# Patient Record
Sex: MALE | Employment: OTHER | URBAN - METROPOLITAN AREA
[De-identification: names, ages, dates, MRNs, and addresses within clinical notes are randomized per-mention and may not be internally consistent; named-entity substitution may affect disease eponyms.]

---

## 2020-11-10 ENCOUNTER — HOSPITAL ENCOUNTER (INPATIENT)
Facility: CLINIC | Age: 60
LOS: 25 days | Discharge: NURSING, REHAB OR LONG-TERM CARE | DRG: 003 | End: 2020-12-05
Attending: INTERNAL MEDICINE | Admitting: INTERNAL MEDICINE
Payer: COMMERCIAL

## 2020-11-10 DIAGNOSIS — G70.01 MYASTHENIA GRAVIS WITH EXACERBATION (H): Primary | ICD-10-CM

## 2020-11-10 PROBLEM — J96.01 ACUTE HYPOXEMIC RESPIRATORY FAILURE (H): Status: ACTIVE | Noted: 2020-11-10

## 2020-11-10 LAB — GLUCOSE BLDC GLUCOMTR-MCNC: 89 MG/DL (ref 70–99)

## 2020-11-10 PROCEDURE — 999N000157 HC STATISTIC RCP TIME EA 10 MIN

## 2020-11-10 PROCEDURE — 200N000001 HC R&B ICU

## 2020-11-10 PROCEDURE — 258N000003 HC RX IP 258 OP 636: Performed by: INTERNAL MEDICINE

## 2020-11-10 PROCEDURE — 999N001017 HC STATISTIC GLUCOSE BY METER IP

## 2020-11-10 PROCEDURE — 81001 URINALYSIS AUTO W/SCOPE: CPT | Performed by: INTERNAL MEDICINE

## 2020-11-10 PROCEDURE — 94002 VENT MGMT INPAT INIT DAY: CPT

## 2020-11-10 PROCEDURE — U0003 INFECTIOUS AGENT DETECTION BY NUCLEIC ACID (DNA OR RNA); SEVERE ACUTE RESPIRATORY SYNDROME CORONAVIRUS 2 (SARS-COV-2) (CORONAVIRUS DISEASE [COVID-19]), AMPLIFIED PROBE TECHNIQUE, MAKING USE OF HIGH THROUGHPUT TECHNOLOGIES AS DESCRIBED BY CMS-2020-01-R: HCPCS | Performed by: INTERNAL MEDICINE

## 2020-11-10 PROCEDURE — 5A1955Z RESPIRATORY VENTILATION, GREATER THAN 96 CONSECUTIVE HOURS: ICD-10-PCS | Performed by: INTERNAL MEDICINE

## 2020-11-10 PROCEDURE — 87899 AGENT NOS ASSAY W/OPTIC: CPT | Performed by: INTERNAL MEDICINE

## 2020-11-10 PROCEDURE — 99291 CRITICAL CARE FIRST HOUR: CPT | Performed by: INTERNAL MEDICINE

## 2020-11-10 PROCEDURE — 250N000013 HC RX MED GY IP 250 OP 250 PS 637: Performed by: INTERNAL MEDICINE

## 2020-11-10 PROCEDURE — 999N000009 HC STATISTIC AIRWAY CARE

## 2020-11-10 PROCEDURE — 87040 BLOOD CULTURE FOR BACTERIA: CPT | Performed by: INTERNAL MEDICINE

## 2020-11-10 RX ORDER — ALBUTEROL SULFATE 0.83 MG/ML
2.5 SOLUTION RESPIRATORY (INHALATION)
Status: DISCONTINUED | OUTPATIENT
Start: 2020-11-10 | End: 2020-12-04

## 2020-11-10 RX ORDER — AMOXICILLIN 250 MG
2 CAPSULE ORAL 2 TIMES DAILY PRN
Status: DISCONTINUED | OUTPATIENT
Start: 2020-11-10 | End: 2020-12-05 | Stop reason: HOSPADM

## 2020-11-10 RX ORDER — DEXTROSE MONOHYDRATE 25 G/50ML
25-50 INJECTION, SOLUTION INTRAVENOUS
Status: DISCONTINUED | OUTPATIENT
Start: 2020-11-10 | End: 2020-12-05 | Stop reason: HOSPADM

## 2020-11-10 RX ORDER — HEPARIN SODIUM 5000 [USP'U]/.5ML
5000 INJECTION, SOLUTION INTRAVENOUS; SUBCUTANEOUS EVERY 8 HOURS
Status: DISCONTINUED | OUTPATIENT
Start: 2020-11-11 | End: 2020-11-17

## 2020-11-10 RX ORDER — SODIUM CHLORIDE, SODIUM LACTATE, POTASSIUM CHLORIDE, CALCIUM CHLORIDE 600; 310; 30; 20 MG/100ML; MG/100ML; MG/100ML; MG/100ML
INJECTION, SOLUTION INTRAVENOUS CONTINUOUS
Status: DISCONTINUED | OUTPATIENT
Start: 2020-11-11 | End: 2020-11-12

## 2020-11-10 RX ORDER — AZITHROMYCIN 500 MG/1
500 INJECTION, POWDER, LYOPHILIZED, FOR SOLUTION INTRAVENOUS EVERY 24 HOURS
Status: DISCONTINUED | OUTPATIENT
Start: 2020-11-11 | End: 2020-11-11

## 2020-11-10 RX ORDER — PROPOFOL 10 MG/ML
10-20 INJECTION, EMULSION INTRAVENOUS EVERY 30 MIN PRN
Status: DISCONTINUED | OUTPATIENT
Start: 2020-11-10 | End: 2020-11-12

## 2020-11-10 RX ORDER — PIPERACILLIN SODIUM, TAZOBACTAM SODIUM 3; .375 G/15ML; G/15ML
3.38 INJECTION, POWDER, LYOPHILIZED, FOR SOLUTION INTRAVENOUS EVERY 6 HOURS
Status: DISCONTINUED | OUTPATIENT
Start: 2020-11-11 | End: 2020-11-11

## 2020-11-10 RX ORDER — PROPOFOL 10 MG/ML
5-75 INJECTION, EMULSION INTRAVENOUS CONTINUOUS
Status: DISCONTINUED | OUTPATIENT
Start: 2020-11-10 | End: 2020-11-12

## 2020-11-10 RX ORDER — ONDANSETRON 2 MG/ML
4 INJECTION INTRAMUSCULAR; INTRAVENOUS EVERY 6 HOURS PRN
Status: DISCONTINUED | OUTPATIENT
Start: 2020-11-10 | End: 2020-11-24

## 2020-11-10 RX ORDER — ACETAMINOPHEN 325 MG/1
650 TABLET ORAL EVERY 4 HOURS PRN
Status: DISCONTINUED | OUTPATIENT
Start: 2020-11-10 | End: 2020-12-05 | Stop reason: HOSPADM

## 2020-11-10 RX ORDER — ONDANSETRON 4 MG/1
4 TABLET, ORALLY DISINTEGRATING ORAL EVERY 6 HOURS PRN
Status: DISCONTINUED | OUTPATIENT
Start: 2020-11-10 | End: 2020-11-24

## 2020-11-10 RX ORDER — NALOXONE HYDROCHLORIDE 0.4 MG/ML
.1-.4 INJECTION, SOLUTION INTRAMUSCULAR; INTRAVENOUS; SUBCUTANEOUS
Status: DISCONTINUED | OUTPATIENT
Start: 2020-11-10 | End: 2020-11-24

## 2020-11-10 RX ORDER — HYDROMORPHONE HYDROCHLORIDE 1 MG/ML
.3-.5 INJECTION, SOLUTION INTRAMUSCULAR; INTRAVENOUS; SUBCUTANEOUS
Status: DISCONTINUED | OUTPATIENT
Start: 2020-11-10 | End: 2020-11-14

## 2020-11-10 RX ORDER — PROPOFOL 10 MG/ML
INJECTION, EMULSION INTRAVENOUS
Status: COMPLETED
Start: 2020-11-10 | End: 2020-11-11

## 2020-11-10 RX ORDER — CHLORHEXIDINE GLUCONATE ORAL RINSE 1.2 MG/ML
15 SOLUTION DENTAL EVERY 12 HOURS
Status: DISCONTINUED | OUTPATIENT
Start: 2020-11-11 | End: 2020-12-04

## 2020-11-10 RX ORDER — AMOXICILLIN 250 MG
1 CAPSULE ORAL 2 TIMES DAILY PRN
Status: DISCONTINUED | OUTPATIENT
Start: 2020-11-10 | End: 2020-12-05 | Stop reason: HOSPADM

## 2020-11-10 RX ORDER — NICOTINE POLACRILEX 4 MG
15-30 LOZENGE BUCCAL
Status: DISCONTINUED | OUTPATIENT
Start: 2020-11-10 | End: 2020-12-05 | Stop reason: HOSPADM

## 2020-11-10 RX ADMIN — SODIUM CHLORIDE, POTASSIUM CHLORIDE, SODIUM LACTATE AND CALCIUM CHLORIDE: 600; 310; 30; 20 INJECTION, SOLUTION INTRAVENOUS at 23:56

## 2020-11-10 RX ADMIN — CHLORHEXIDINE GLUCONATE 0.12% ORAL RINSE 15 ML: 1.2 LIQUID ORAL at 23:56

## 2020-11-10 ASSESSMENT — MIFFLIN-ST. JEOR: SCORE: 1607.5

## 2020-11-11 ENCOUNTER — APPOINTMENT (OUTPATIENT)
Dept: MRI IMAGING | Facility: CLINIC | Age: 60
DRG: 003 | End: 2020-11-11
Attending: STUDENT IN AN ORGANIZED HEALTH CARE EDUCATION/TRAINING PROGRAM
Payer: COMMERCIAL

## 2020-11-11 ENCOUNTER — APPOINTMENT (OUTPATIENT)
Dept: GENERAL RADIOLOGY | Facility: CLINIC | Age: 60
DRG: 003 | End: 2020-11-11
Attending: INTERNAL MEDICINE
Payer: COMMERCIAL

## 2020-11-11 ENCOUNTER — TRANSFERRED RECORDS (OUTPATIENT)
Dept: HEALTH INFORMATION MANAGEMENT | Facility: CLINIC | Age: 60
End: 2020-11-11

## 2020-11-11 LAB
AFP SERPL-MCNC: 2.5 UG/L (ref 0–8)
ALBUMIN SERPL-MCNC: 2.7 G/DL (ref 3.4–5)
ALBUMIN UR-MCNC: 30 MG/DL
ALP SERPL-CCNC: 55 U/L (ref 40–150)
ALT SERPL W P-5'-P-CCNC: 44 U/L (ref 0–70)
ANION GAP SERPL CALCULATED.3IONS-SCNC: 4 MMOL/L (ref 3–14)
ANION GAP SERPL CALCULATED.3IONS-SCNC: 5 MMOL/L (ref 3–14)
APPEARANCE UR: CLEAR
AST SERPL W P-5'-P-CCNC: 29 U/L (ref 0–45)
BASE DEFICIT BLDV-SCNC: 0.3 MMOL/L
BASE DEFICIT BLDV-SCNC: 1 MMOL/L
BILIRUB SERPL-MCNC: 0.3 MG/DL (ref 0.2–1.3)
BILIRUB UR QL STRIP: NEGATIVE
BUN SERPL-MCNC: 23 MG/DL (ref 7–30)
BUN SERPL-MCNC: 23 MG/DL (ref 7–30)
CA-I BLD-MCNC: 4.3 MG/DL (ref 4.4–5.2)
CALCIUM SERPL-MCNC: 7.3 MG/DL (ref 8.5–10.1)
CALCIUM SERPL-MCNC: 7.9 MG/DL (ref 8.5–10.1)
CHLORIDE SERPL-SCNC: 113 MMOL/L (ref 94–109)
CHLORIDE SERPL-SCNC: 113 MMOL/L (ref 94–109)
CO2 SERPL-SCNC: 25 MMOL/L (ref 20–32)
CO2 SERPL-SCNC: 26 MMOL/L (ref 20–32)
COLOR UR AUTO: YELLOW
CREAT SERPL-MCNC: 0.91 MG/DL (ref 0.66–1.25)
CREAT SERPL-MCNC: 0.95 MG/DL (ref 0.66–1.25)
ERYTHROCYTE [DISTWIDTH] IN BLOOD BY AUTOMATED COUNT: 13.9 % (ref 10–15)
ERYTHROCYTE [DISTWIDTH] IN BLOOD BY AUTOMATED COUNT: 14 % (ref 10–15)
FLUAV+FLUBV AG SPEC QL: NEGATIVE
FLUAV+FLUBV AG SPEC QL: NEGATIVE
GFR SERPL CREATININE-BSD FRML MDRD: 87 ML/MIN/{1.73_M2}
GFR SERPL CREATININE-BSD FRML MDRD: >90 ML/MIN/{1.73_M2}
GLUCOSE BLDC GLUCOMTR-MCNC: 108 MG/DL (ref 70–99)
GLUCOSE BLDC GLUCOMTR-MCNC: 71 MG/DL (ref 70–99)
GLUCOSE BLDC GLUCOMTR-MCNC: 80 MG/DL (ref 70–99)
GLUCOSE BLDC GLUCOMTR-MCNC: 83 MG/DL (ref 70–99)
GLUCOSE BLDC GLUCOMTR-MCNC: 87 MG/DL (ref 70–99)
GLUCOSE BLDC GLUCOMTR-MCNC: 91 MG/DL (ref 70–99)
GLUCOSE SERPL-MCNC: 77 MG/DL (ref 70–99)
GLUCOSE SERPL-MCNC: 86 MG/DL (ref 70–99)
GLUCOSE UR STRIP-MCNC: NEGATIVE MG/DL
GRAM STN SPEC: NORMAL
GRAM STN SPEC: NORMAL
HCO3 BLDV-SCNC: 26 MMOL/L (ref 21–28)
HCO3 BLDV-SCNC: 27 MMOL/L (ref 21–28)
HCT VFR BLD AUTO: 41.2 % (ref 40–53)
HCT VFR BLD AUTO: 41.6 % (ref 40–53)
HGB BLD-MCNC: 13.3 G/DL (ref 13.3–17.7)
HGB BLD-MCNC: 13.3 G/DL (ref 13.3–17.7)
HGB UR QL STRIP: ABNORMAL
KETONES UR STRIP-MCNC: 40 MG/DL
L PNEUMO1 AG UR QL IA: NORMAL
LABORATORY COMMENT REPORT: NORMAL
LACTATE BLD-SCNC: 1.5 MMOL/L (ref 0.7–2)
LACTATE BLD-SCNC: 4.1 MMOL/L (ref 0.7–2)
LDH SERPL L TO P-CCNC: 122 U/L (ref 85–227)
LEUKOCYTE ESTERASE UR QL STRIP: NEGATIVE
MAGNESIUM SERPL-MCNC: 2.2 MG/DL (ref 1.6–2.3)
MCH RBC QN AUTO: 30 PG (ref 26.5–33)
MCH RBC QN AUTO: 30 PG (ref 26.5–33)
MCHC RBC AUTO-ENTMCNC: 32 G/DL (ref 31.5–36.5)
MCHC RBC AUTO-ENTMCNC: 32.3 G/DL (ref 31.5–36.5)
MCV RBC AUTO: 93 FL (ref 78–100)
MCV RBC AUTO: 94 FL (ref 78–100)
MUCOUS THREADS #/AREA URNS LPF: PRESENT /LPF
NITRATE UR QL: NEGATIVE
O2/TOTAL GAS SETTING VFR VENT: 60 %
OXYHGB MFR BLDV: 62 %
OXYHGB MFR BLDV: 68 %
PCO2 BLDV: 48 MM HG (ref 40–50)
PCO2 BLDV: 57 MM HG (ref 40–50)
PH BLDV: 7.28 PH (ref 7.32–7.43)
PH BLDV: 7.34 PH (ref 7.32–7.43)
PH UR STRIP: 5.5 PH (ref 5–7)
PHOSPHATE SERPL-MCNC: 3.3 MG/DL (ref 2.5–4.5)
PLATELET # BLD AUTO: 199 10E9/L (ref 150–450)
PLATELET # BLD AUTO: 221 10E9/L (ref 150–450)
PO2 BLDV: 33 MM HG (ref 25–47)
PO2 BLDV: 40 MM HG (ref 25–47)
POTASSIUM SERPL-SCNC: 3.8 MMOL/L (ref 3.4–5.3)
POTASSIUM SERPL-SCNC: 4.3 MMOL/L (ref 3.4–5.3)
PROT SERPL-MCNC: 5.2 G/DL (ref 6.8–8.8)
RBC # BLD AUTO: 4.44 10E12/L (ref 4.4–5.9)
RBC # BLD AUTO: 4.44 10E12/L (ref 4.4–5.9)
RBC #/AREA URNS AUTO: 51 /HPF (ref 0–2)
SARS-COV-2 RNA SPEC QL NAA+PROBE: NEGATIVE
SARS-COV-2 RNA SPEC QL NAA+PROBE: NORMAL
SODIUM SERPL-SCNC: 142 MMOL/L (ref 133–144)
SODIUM SERPL-SCNC: 144 MMOL/L (ref 133–144)
SOURCE: ABNORMAL
SP GR UR STRIP: 1.03 (ref 1–1.03)
SPECIMEN SOURCE: NORMAL
UROBILINOGEN UR STRIP-MCNC: NORMAL MG/DL (ref 0–2)
WBC # BLD AUTO: 11.6 10E9/L (ref 4–11)
WBC # BLD AUTO: 12.6 10E9/L (ref 4–11)
WBC #/AREA URNS AUTO: 2 /HPF (ref 0–5)

## 2020-11-11 PROCEDURE — 999N000065 XR CHEST PORT 1 VW

## 2020-11-11 PROCEDURE — 82105 ALPHA-FETOPROTEIN SERUM: CPT | Performed by: INTERNAL MEDICINE

## 2020-11-11 PROCEDURE — 84182 PROTEIN WESTERN BLOT TEST: CPT | Performed by: INTERNAL MEDICINE

## 2020-11-11 PROCEDURE — C9113 INJ PANTOPRAZOLE SODIUM, VIA: HCPCS | Performed by: INTERNAL MEDICINE

## 2020-11-11 PROCEDURE — 250N000009 HC RX 250: Performed by: INTERNAL MEDICINE

## 2020-11-11 PROCEDURE — 83519 RIA NONANTIBODY: CPT | Performed by: INTERNAL MEDICINE

## 2020-11-11 PROCEDURE — 86255 FLUORESCENT ANTIBODY SCREEN: CPT | Performed by: INTERNAL MEDICINE

## 2020-11-11 PROCEDURE — 250N000011 HC RX IP 250 OP 636: Performed by: INTERNAL MEDICINE

## 2020-11-11 PROCEDURE — 258N000003 HC RX IP 258 OP 636: Performed by: INTERNAL MEDICINE

## 2020-11-11 PROCEDURE — 99291 CRITICAL CARE FIRST HOUR: CPT | Performed by: INTERNAL MEDICINE

## 2020-11-11 PROCEDURE — 36415 COLL VENOUS BLD VENIPUNCTURE: CPT | Performed by: INTERNAL MEDICINE

## 2020-11-11 PROCEDURE — 999N000009 HC STATISTIC AIRWAY CARE

## 2020-11-11 PROCEDURE — 86301 IMMUNOASSAY TUMOR CA 19-9: CPT | Performed by: INTERNAL MEDICINE

## 2020-11-11 PROCEDURE — 82330 ASSAY OF CALCIUM: CPT | Performed by: INTERNAL MEDICINE

## 2020-11-11 PROCEDURE — 258N000001 HC RX 258: Performed by: INTERNAL MEDICINE

## 2020-11-11 PROCEDURE — 99207 PR SATISFY VISIT NUMBER: CPT | Performed by: PSYCHIATRY & NEUROLOGY

## 2020-11-11 PROCEDURE — 94003 VENT MGMT INPAT SUBQ DAY: CPT

## 2020-11-11 PROCEDURE — 3E043XZ INTRODUCTION OF VASOPRESSOR INTO CENTRAL VEIN, PERCUTANEOUS APPROACH: ICD-10-PCS | Performed by: INTERNAL MEDICINE

## 2020-11-11 PROCEDURE — 81001 URINALYSIS AUTO W/SCOPE: CPT | Performed by: INTERNAL MEDICINE

## 2020-11-11 PROCEDURE — 83519 RIA NONANTIBODY: CPT | Performed by: STUDENT IN AN ORGANIZED HEALTH CARE EDUCATION/TRAINING PROGRAM

## 2020-11-11 PROCEDURE — 83735 ASSAY OF MAGNESIUM: CPT | Performed by: INTERNAL MEDICINE

## 2020-11-11 PROCEDURE — 87040 BLOOD CULTURE FOR BACTERIA: CPT | Performed by: INTERNAL MEDICINE

## 2020-11-11 PROCEDURE — 82805 BLOOD GASES W/O2 SATURATION: CPT | Performed by: INTERNAL MEDICINE

## 2020-11-11 PROCEDURE — 83615 LACTATE (LD) (LDH) ENZYME: CPT | Performed by: INTERNAL MEDICINE

## 2020-11-11 PROCEDURE — 999N001017 HC STATISTIC GLUCOSE BY METER IP

## 2020-11-11 PROCEDURE — 83516 IMMUNOASSAY NONANTIBODY: CPT | Performed by: STUDENT IN AN ORGANIZED HEALTH CARE EDUCATION/TRAINING PROGRAM

## 2020-11-11 PROCEDURE — 999N000065 XR ABDOMEN PORT 1 VW

## 2020-11-11 PROCEDURE — 36556 INSERT NON-TUNNEL CV CATH: CPT | Performed by: INTERNAL MEDICINE

## 2020-11-11 PROCEDURE — 80053 COMPREHEN METABOLIC PANEL: CPT | Performed by: INTERNAL MEDICINE

## 2020-11-11 PROCEDURE — 272N000078 HC NUTRITION PRODUCT INTERMEDIATE LITER

## 2020-11-11 PROCEDURE — 250N000013 HC RX MED GY IP 250 OP 250 PS 637: Performed by: INTERNAL MEDICINE

## 2020-11-11 PROCEDURE — 80048 BASIC METABOLIC PNL TOTAL CA: CPT | Performed by: INTERNAL MEDICINE

## 2020-11-11 PROCEDURE — 93010 ELECTROCARDIOGRAM REPORT: CPT | Performed by: INTERNAL MEDICINE

## 2020-11-11 PROCEDURE — 93005 ELECTROCARDIOGRAM TRACING: CPT

## 2020-11-11 PROCEDURE — 87070 CULTURE OTHR SPECIMN AEROBIC: CPT | Performed by: INTERNAL MEDICINE

## 2020-11-11 PROCEDURE — 99291 CRITICAL CARE FIRST HOUR: CPT | Mod: 25 | Performed by: PSYCHIATRY & NEUROLOGY

## 2020-11-11 PROCEDURE — 250N000011 HC RX IP 250 OP 636

## 2020-11-11 PROCEDURE — 83605 ASSAY OF LACTIC ACID: CPT | Performed by: INTERNAL MEDICINE

## 2020-11-11 PROCEDURE — 999N000157 HC STATISTIC RCP TIME EA 10 MIN

## 2020-11-11 PROCEDURE — 84100 ASSAY OF PHOSPHORUS: CPT | Performed by: INTERNAL MEDICINE

## 2020-11-11 PROCEDURE — 02HV33Z INSERTION OF INFUSION DEVICE INTO SUPERIOR VENA CAVA, PERCUTANEOUS APPROACH: ICD-10-PCS | Performed by: INTERNAL MEDICINE

## 2020-11-11 PROCEDURE — 87804 INFLUENZA ASSAY W/OPTIC: CPT | Performed by: INTERNAL MEDICINE

## 2020-11-11 PROCEDURE — 86255 FLUORESCENT ANTIBODY SCREEN: CPT | Performed by: STUDENT IN AN ORGANIZED HEALTH CARE EDUCATION/TRAINING PROGRAM

## 2020-11-11 PROCEDURE — 87205 SMEAR GRAM STAIN: CPT | Performed by: INTERNAL MEDICINE

## 2020-11-11 PROCEDURE — 258N000003 HC RX IP 258 OP 636: Performed by: SURGERY

## 2020-11-11 PROCEDURE — 83520 IMMUNOASSAY QUANT NOS NONAB: CPT | Performed by: INTERNAL MEDICINE

## 2020-11-11 PROCEDURE — 86256 FLUORESCENT ANTIBODY TITER: CPT | Performed by: STUDENT IN AN ORGANIZED HEALTH CARE EDUCATION/TRAINING PROGRAM

## 2020-11-11 PROCEDURE — 200N000001 HC R&B ICU

## 2020-11-11 PROCEDURE — 85027 COMPLETE CBC AUTOMATED: CPT | Performed by: INTERNAL MEDICINE

## 2020-11-11 PROCEDURE — 70553 MRI BRAIN STEM W/O & W/DYE: CPT

## 2020-11-11 RX ORDER — ATROPINE SULFATE 0.1 MG/ML
0.5 INJECTION INTRAVENOUS ONCE
Status: COMPLETED | OUTPATIENT
Start: 2020-11-11 | End: 2020-11-11

## 2020-11-11 RX ORDER — ATROPINE SULFATE 0.1 MG/ML
1 INJECTION INTRAVENOUS
Status: COMPLETED | OUTPATIENT
Start: 2020-11-11 | End: 2020-11-11

## 2020-11-11 RX ORDER — PREDNISONE 20 MG/1
20 TABLET ORAL 2 TIMES DAILY
Status: ON HOLD | COMMUNITY
End: 2020-11-30

## 2020-11-11 RX ORDER — HYDROMORPHONE HYDROCHLORIDE 1 MG/ML
.3-.5 INJECTION, SOLUTION INTRAMUSCULAR; INTRAVENOUS; SUBCUTANEOUS ONCE
Status: DISCONTINUED | OUTPATIENT
Start: 2020-11-11 | End: 2020-11-12 | Stop reason: CLARIF

## 2020-11-11 RX ORDER — AMPICILLIN AND SULBACTAM 2; 1 G/1; G/1
3 INJECTION, POWDER, FOR SOLUTION INTRAMUSCULAR; INTRAVENOUS EVERY 6 HOURS
Status: COMPLETED | OUTPATIENT
Start: 2020-11-11 | End: 2020-11-18

## 2020-11-11 RX ORDER — SODIUM CHLORIDE 9 MG/ML
INJECTION, SOLUTION INTRAVENOUS CONTINUOUS
Status: DISCONTINUED | OUTPATIENT
Start: 2020-11-11 | End: 2020-12-04

## 2020-11-11 RX ORDER — HYDROCODONE BITARTRATE AND ACETAMINOPHEN 5; 325 MG/1; MG/1
1-2 TABLET ORAL 2 TIMES DAILY PRN
Status: ON HOLD | COMMUNITY
End: 2020-12-05

## 2020-11-11 RX ORDER — TAMSULOSIN HYDROCHLORIDE 0.4 MG/1
0.4 CAPSULE ORAL DAILY
COMMUNITY

## 2020-11-11 RX ORDER — NOREPINEPHRINE BITARTRATE 0.06 MG/ML
0.03-0.4 INJECTION, SOLUTION INTRAVENOUS CONTINUOUS
Status: DISCONTINUED | OUTPATIENT
Start: 2020-11-11 | End: 2020-11-15

## 2020-11-11 RX ORDER — ASPIRIN 81 MG/1
81 TABLET, CHEWABLE ORAL DAILY
Status: ON HOLD | COMMUNITY
End: 2020-11-11

## 2020-11-11 RX ORDER — LIDOCAINE 40 MG/G
CREAM TOPICAL
Status: ACTIVE | OUTPATIENT
Start: 2020-11-11 | End: 2020-11-14

## 2020-11-11 RX ORDER — ATROPINE SULFATE 0.1 MG/ML
INJECTION INTRAVENOUS
Status: COMPLETED
Start: 2020-11-11 | End: 2020-11-11

## 2020-11-11 RX ORDER — HEPARIN SODIUM,PORCINE 10 UNIT/ML
2-5 VIAL (ML) INTRAVENOUS
Status: ACTIVE | OUTPATIENT
Start: 2020-11-11 | End: 2020-11-14

## 2020-11-11 RX ADMIN — VANCOMYCIN HYDROCHLORIDE 1500 MG: 5 INJECTION, POWDER, LYOPHILIZED, FOR SOLUTION INTRAVENOUS at 03:09

## 2020-11-11 RX ADMIN — SODIUM CHLORIDE, POTASSIUM CHLORIDE, SODIUM LACTATE AND CALCIUM CHLORIDE 500 ML: 600; 310; 30; 20 INJECTION, SOLUTION INTRAVENOUS at 16:36

## 2020-11-11 RX ADMIN — PROPOFOL 15 MCG/KG/MIN: 10 INJECTION, EMULSION INTRAVENOUS at 10:11

## 2020-11-11 RX ADMIN — ATROPINE SULFATE 1 MG: 0.1 INJECTION PARENTERAL at 19:41

## 2020-11-11 RX ADMIN — PROPOFOL 10 MCG/KG/MIN: 10 INJECTION, EMULSION INTRAVENOUS at 00:08

## 2020-11-11 RX ADMIN — Medication 0.03 MCG/KG/MIN: at 09:59

## 2020-11-11 RX ADMIN — ATROPINE SULFATE 0.5 MG: 0.1 INJECTION PARENTERAL at 10:19

## 2020-11-11 RX ADMIN — PROPOFOL 20 MCG/KG/MIN: 10 INJECTION, EMULSION INTRAVENOUS at 19:19

## 2020-11-11 RX ADMIN — PIPERACILLIN SODIUM AND TAZOBACTAM SODIUM 3.38 G: 3; .375 INJECTION, POWDER, LYOPHILIZED, FOR SOLUTION INTRAVENOUS at 00:55

## 2020-11-11 RX ADMIN — CHLORHEXIDINE GLUCONATE 0.12% ORAL RINSE 15 ML: 1.2 LIQUID ORAL at 08:15

## 2020-11-11 RX ADMIN — SODIUM CHLORIDE: 9 INJECTION, SOLUTION INTRAVENOUS at 00:55

## 2020-11-11 RX ADMIN — PROPOFOL 20 MCG/KG/MIN: 10 INJECTION, EMULSION INTRAVENOUS at 23:06

## 2020-11-11 RX ADMIN — HEPARIN SODIUM 5000 UNITS: 5000 INJECTION, SOLUTION INTRAVENOUS; SUBCUTANEOUS at 14:42

## 2020-11-11 RX ADMIN — AMPICILLIN AND SULBACTAM 3 G: 2; 1 INJECTION, POWDER, FOR SOLUTION INTRAMUSCULAR; INTRAVENOUS at 17:07

## 2020-11-11 RX ADMIN — HYDROMORPHONE HYDROCHLORIDE 0.5 MG: 1 INJECTION, SOLUTION INTRAMUSCULAR; INTRAVENOUS; SUBCUTANEOUS at 15:00

## 2020-11-11 RX ADMIN — SODIUM CHLORIDE, POTASSIUM CHLORIDE, SODIUM LACTATE AND CALCIUM CHLORIDE: 600; 310; 30; 20 INJECTION, SOLUTION INTRAVENOUS at 12:00

## 2020-11-11 RX ADMIN — SODIUM CHLORIDE, POTASSIUM CHLORIDE, SODIUM LACTATE AND CALCIUM CHLORIDE 500 ML: 600; 310; 30; 20 INJECTION, SOLUTION INTRAVENOUS at 08:36

## 2020-11-11 RX ADMIN — PIPERACILLIN SODIUM AND TAZOBACTAM SODIUM 3.38 G: 3; .375 INJECTION, POWDER, LYOPHILIZED, FOR SOLUTION INTRAVENOUS at 06:46

## 2020-11-11 RX ADMIN — DEXTROSE MONOHYDRATE 25 ML: 25 INJECTION, SOLUTION INTRAVENOUS at 08:21

## 2020-11-11 RX ADMIN — HYDROMORPHONE HYDROCHLORIDE 0.5 MG: 1 INJECTION, SOLUTION INTRAMUSCULAR; INTRAVENOUS; SUBCUTANEOUS at 18:45

## 2020-11-11 RX ADMIN — SODIUM CHLORIDE, POTASSIUM CHLORIDE, SODIUM LACTATE AND CALCIUM CHLORIDE 1000 ML: 600; 310; 30; 20 INJECTION, SOLUTION INTRAVENOUS at 06:12

## 2020-11-11 RX ADMIN — AMPICILLIN AND SULBACTAM 3 G: 2; 1 INJECTION, POWDER, FOR SOLUTION INTRAMUSCULAR; INTRAVENOUS at 12:00

## 2020-11-11 RX ADMIN — MULTIVITAMIN 15 ML: LIQUID ORAL at 16:36

## 2020-11-11 RX ADMIN — SODIUM CHLORIDE: 9 INJECTION, SOLUTION INTRAVENOUS at 09:58

## 2020-11-11 RX ADMIN — ATROPINE SULFATE 0.5 MG: 0.1 INJECTION INTRAVENOUS at 10:19

## 2020-11-11 RX ADMIN — HEPARIN SODIUM 5000 UNITS: 5000 INJECTION, SOLUTION INTRAVENOUS; SUBCUTANEOUS at 06:16

## 2020-11-11 RX ADMIN — PANTOPRAZOLE SODIUM 40 MG: 40 INJECTION, POWDER, FOR SOLUTION INTRAVENOUS at 08:21

## 2020-11-11 RX ADMIN — CHLORHEXIDINE GLUCONATE 0.12% ORAL RINSE 15 ML: 1.2 LIQUID ORAL at 22:37

## 2020-11-11 RX ADMIN — AZITHROMYCIN MONOHYDRATE 500 MG: 500 INJECTION, POWDER, LYOPHILIZED, FOR SOLUTION INTRAVENOUS at 01:45

## 2020-11-11 ASSESSMENT — ACTIVITIES OF DAILY LIVING (ADL)
ADLS_ACUITY_SCORE: 24
ADLS_ACUITY_SCORE: 19
ADLS_ACUITY_SCORE: 24

## 2020-11-11 ASSESSMENT — MIFFLIN-ST. JEOR: SCORE: 1607.5

## 2020-11-11 NOTE — PROVIDER NOTIFICATION
Patient became hypotensive with SBP in the 80's (MAPS in 60's) and bradycardic with HR in the upper 40's.  notified. Order for 1L bolus to be given.

## 2020-11-11 NOTE — PROVIDER NOTIFICATION
MD Notification    Notified Person: MD    Notified Person Name: Dr. Nelsonlizandro     Notification Date/Time: 11/11/20 1015    Notification Interaction: Verbal    Purpose of Notification: Pt bradycardic 30's, was karina in the 40's previously.    Orders Received: 0.5 mg Atropine, if pt continues to become bradycardic in the 30's give 1 mg.    Comments:

## 2020-11-11 NOTE — PROGRESS NOTES
[facility] ICU RESPIRATORY NOTE        Date of Admission: 11/10/2020    Date of Intubation (most recent): 11/10/20    Reason for Mechanical Ventilation: acute hypoxic respiratory failure    Number of Days on Mechanical Ventilation: 2    Met Criteria for Spontaneous Breathing Trial: No    Reason for No Spontaneous Breathing Trial: Per MD      ABG Results:   Recent Labs   Lab 11/11/20  0635   O2PER 60       Current Vent Settings: Ventilation Mode: CMV/AC  (Continuous Mandatory Ventilation/ Assist Control)  FiO2 (%): 40 %  Rate Set (breaths/minute): 16 breaths/min  Tidal Volume Set (mL): 450 mL  PEEP (cm H2O): 10 cmH2O  Oxygen Concentration (%): 60 %  Resp: 16      Plan: Keep monitoring the patient.    Kasandra Donovan, RT

## 2020-11-11 NOTE — PROCEDURES
[facility]    Central line    Date/Time: 11/11/2020 6:38 AM  Performed by: Kaiden Queen MD  Authorized by: Kaiden Queen MD   Indications: acute hypoxemic respiratory failure.    UNIVERSAL PROTOCOL   Site Marked: NA  Prior Images Obtained and Reviewed:  NA  Required items: Required blood products, implants, devices and special equipment available    Patient identity confirmed:  Arm band  NA - No sedation, light sedation, or local anesthesia  Confirmation Checklist:  Patient's identity using two indicators, relevant allergies, procedure was appropriate and matched the consent or emergent situation and correct equipment/implants were available  Time out: Immediately prior to the procedure a time out was called    Universal Protocol: the Joint Commission Universal Protocol was followed    Preparation: Patient was prepped and draped in usual sterile fashion           ANESTHESIA    Local Anesthetic: Lidocaine 1% without epinephrine      SEDATION    Patient Sedated: No      Preparation: skin prepped with ChloraPrep  Patient position: reverse Trendelenburg  Catheter type: triple lumen  Pre-procedure: landmarks identified  Ultrasound guidance: yes  Sterile ultrasound techniques: sterile gel and sterile probe covers were used  Number of attempts: 1  Successful placement: yes  Post-procedure: line sutured and dressing applied  Assessment: blood return through all ports and free fluid flow    PROCEDURE   Length of time physician/provider present for 1:1 monitoring during sedation: 0

## 2020-11-11 NOTE — PROVIDER NOTIFICATION
MD Notification    Notified Person: MD    Notified Person Name: Dr. Clement    Notification Date/Time: 0930 11/11/20    Notification Interaction: Verbal    Purpose of Notification: Pt hypotensive despite bolus, 80's/50's, map 63.    Orders Received: Levophed goal map >65    Comments:
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
## 2020-11-11 NOTE — PROGRESS NOTES
[facility] ICU RESPIRATORY NOTE  Date of Admission: 11/10/20  Date of Intubation (most recent): 11/10/20  Reason for Mechanical Ventilation: Resp. failure  Number of Days on Mechanical Ventilation: 2  Met Criteria for Pressure Support Trial:   Length of Pressure Support Trial:    Reason for Stopping Pressure Support Trial:  Reason for No Pressure Support Trial: per MD     Significant Events Today: Pt arrived intubated from OSH     ABG Results: No lab results found in last 7 days.  Venous Blood Gas  Recent Labs   Lab 11/11/20  0252   PHV 7.28*   PCO2V 57*   PO2V 40   HCO3V 27     Ventilation Mode: CMV/AC  (Continuous Mandatory Ventilation/ Assist Control)  FiO2 (%): 50 %  Rate Set (breaths/minute): 14 breaths/min  Tidal Volume Set (mL): 450 mL  PEEP (cm H2O): 5 cmH2O  Oxygen Concentration (%): 40 %  Resp: 14    Joao Smallwood, RT

## 2020-11-11 NOTE — H&P
Critical Care  Note      11/10/2020    Name: Roosevelt Burris MRN#: [id number]   Age: 60 year old YOB: 1960     Hsptl Day# 0  ICU DAY # 0    MV DAY # 0             Problem List:   Acute hypoxemic respiratory failure, vent dependent  Progressive muscle weakness reportedly x1 month  H/o TBI, chronic deficits: dysarthria, L facial droop; L sided weakness         Summary/Hospital Course:     60M pmh of TBI with chronic dysarthria, L sided facial droop and L sided weakness now presented to OSH ED for worsening respiratory failure.  Intubated for work of breathing in ED there.  Due to intubated/vented status I am unable to obtain history myself, but per report he has been having a 20 lb weight loss over the last month in addition to progressively worsening muscle weakness.  Apparently was being worked up for guillain-barre syndrome, but unclear that this was ever demonstrated, and records of this workup are unavailable tonight.  Today he presented to an OSH ED with worsening difficulty breathing and ultimately required intubation.  Etiology of respiratory failure there unclear, but he apparently had infiltrates on cxr.      Assessment and plan :     Roosevelt Burris IS a 60 year old male admitted on 11/10/20 for acute hypoxemic respiratory failure.   I have personally reviewed the daily labs, imaging studies, cultures and discussed the case with referring physician and consulting physicians.     My assessment and plan by system for this patient is as follows:    Neurology/Psychiatry:   1. Muscle weakness:  Unclear cause, but curious that it's occurring in setting of weight loss.  Possible that progressive weakness is leading to dysphagia and difficulty with intake, also possible that weight loss is due to yet undiagnosed underlying neoplasm with paraneoplastic myasthenic syndrome.  Consulting neurology for further workup and possible electrodiagnostic studies.  2. Pain/analgesia:  Prn dilaudid  3. Sedation:   Propofol drip; prn midazolam    Cardiovascular:   1. Hemodynamically stable      Pulmonary/Ventilator Management:   1. Acute hypoxemic respiratory failure, vent dependent:  Unclear cause, but required intubation/mechanical ventilation.  Repeat chest imaging here.  Situation is suspicious for infectious source, either aspiration pna if muscle weakness is causing increasing dysphagia, or a primary lung infection such as community acquired pna, influenza of covid 19.  Workup and abx as below in ID section.    GI and Nutrition :   1. NPO for tonight;  TF in AM  2.  PPI for pud prophy    Renal/Fluids/Electrolytes:   1. ANNE:  Mild at OSH -- creat 1.17; baseline unknown.    Infectious Disease:   1. Acute hypoxemic respiratory failure, possibly due to infectious cause:  Initiating vanco/zosyn/azithro; low threshold to discont azithro if legionella negative.  Sputum culture, urine legionella ag.  Repeat covid-19 testing, though tested negative at OSH and apparently this was pcr test.  Flu and rsv pcr negative at OSH.    Endocrine:   1. Monitor fsg:  Prn insulin vs dextrose    Hematology/Oncology:   1. Wbc elevated at OSH 16.1  2. hgb 16.8 at OSH, ok  3. pltlts ok at   4.  Weight loss:  May be that worsening weakness is causing dysphagia and decreased intake, vs possible malignancy.  Checking afp and ca19-9.  Ideally would perform CT ch/abd/pelv with contrast, but this is non-emergent so will hold to see if creatinine normalizes.    ICU Prophylaxis:   1. DVT: Hep Subq/ mechanical  2. VAP: HOB 30 degrees, chlorhexidine rinse  3. Stress Ulcer: PPI  4. Restraints: Nonviolent soft two point restraints required and necessary for patient safety and continued cares and good effect as patient continues to pull at necessary lines, tubes despite education and distraction. Will readdress daily.   5. Wound care  - per unit routine  6. Feeding - NPO for tonight; tf in AM  7. Family Update:Pts sister Sherie Krueger by phone  [phone]  8. Disposition - ICU           Medical History:     [Obtained from OSH records]    PMH:  TBI with known deficits of dysarthria, L facial droop and L sided weakness; BPH    PSH: craniotomy/lindsey holes; orthopedic fracture repairs    Meds: aspirin 81 daily; norco 5-325; flomax 0.4 at bedtime.  Prednisone 20 bid and augmentin started 11/10.    All: NKA    Sochx:  Active smoker until 20 days prior to admit; marijuana smoker; occasional etoh    Famhx:  Brother--esophageal CA. Otherwise reviewed and not significant    ROS:  Unable due to intubated/vented status           Physical Examination:   BP: ()/()   Arterial Line BP: ()/()   No intake or output data in the 24 hours ending 11/10/20 2129  Wt Readings from Last 4 Encounters:   No data found for Wt     Arterial Line BP: ()/()   No data recorded  No lab results found in last 7 days.    GEN: no acute distress, sedated   HEENT: head ncat, sclera anicteric, OP patent, trachea midline   PULM: unlabored synchronous with vent, clear anteriorly    CV/COR: RRR S1S2 no gallop,  No rub, no murmur  ABD: soft nontender, hypoactive bowel sounds, no mass  EXT: no Edema; warm x4  NEURO: grossly intact but sedated.  Perrl.  Toes neutral b/l.  SKIN: no obvious rash  LINES: clean, dry intact         Data:   All data and imaging reviewed     ROUTINE ICU LABS (Last four results)  CMPNo lab results found in last 7 days.  CBCNo lab results found in last 7 days.  INRNo lab results found in last 7 days.  Arterial Blood GasNo lab results found in last 7 days.    All cultures:  No results for input(s): CULT in the last 168 hours.  No results found for this or any previous visit (from the past 24 hour(s)).    Labs (personally reviewed/interpreted):  See a/p.    D/w OSH ED physician Dr. Newberry.    Billing: This patient is critically ill: Yes. Total critical care time today 45 min, excluding procedures.

## 2020-11-11 NOTE — PHARMACY-VANCOMYCIN DOSING SERVICE
Pharmacy Vancomycin Initial Note  Date of Service 2020  Patient's  1960  60 year old, male    Indication: Aspiration Pneumonia    Current estimated CrCl = CrCl cannot be calculated (No successful lab value found.).    Creatinine for last 3 days  No results found for requested labs within last 72 hours.    Recent Vancomycin Level(s) for last 3 days  No results found for requested labs within last 72 hours.      Vancomycin IV Administrations (past 72 hours)      No vancomycin orders with administrations in past 72 hours.                Nephrotoxins and other renal medications (From now, onward)    Start     Dose/Rate Route Frequency Ordered Stop    20 0200  vancomycin 1500 mg in 0.9% NaCl 250 ml intermittent infusion 1,500 mg      1,500 mg  over 90 Minutes Intravenous EVERY 12 HOURS 20 0003      20 0000  piperacillin-tazobactam (ZOSYN) 3.375 g vial to attach to  mL bag      3.375 g  over 30 Minutes Intravenous EVERY 6 HOURS 11/10/20 2358            Contrast Orders - past 72 hours (72h ago, onward)    None                Plan:  1.  Start vancomycin  1500 mg IV q12h.   2.  Goal Trough Level: 15-20 mg/L   3.  Pharmacy will check trough levels as appropriate in 1-3 Days.    4. Serum creatinine levels will be ordered daily for the first week of therapy and at least twice weekly for subsequent weeks.    5. [place] method utilized to dose vancomycin therapy: Method 1    He Velarde [facility]

## 2020-11-11 NOTE — CONSULTS
"CLINICAL NUTRITION SERVICES  -  ASSESSMENT NOTE      Recommendations Ordered by Registered Dietitian (RD):   Begin Replete no fiber at 15 mL/hr for 12 hours; increase by 20 mL q 12 hrs to reach prelim goal 55 mL/hr to provide 1320 kcal, 84 g pro (1.0 g/kg), 1096 mL free H2O, 0 g fiber, 148 g CHO.  Total (TF + Propofol) = 1579 kcal (19 kcal/kg)  Free H2O 60 mL every 4 hours  Order Certavite     Future/Additional Recommendations:   Recommend increase TF as tolerated as eventual final goal Replete no fiber at 80 mL/hr = 1920 kcal, 123 g pro (1.5 g/kg), 215 g CHO, 1613 mL H2O  Total (TF + Propofol) = 2180 kcal (27 kcal/kg)     Malnutrition:   Unable to determine due to inability to perform Nutrition Focused Physical Exam        REASON FOR ASSESSMENT  Roosveelt Burris is a 60 year old male seen by Registered Dietitian for Provider Order - Registered Dietitian to Assess and Order TF per Medical Nutrition Therapy Protocol.      NUTRITION HISTORY  - Unable to obtain nutrition history as patient is intubated.  - Patient with progressive muscle weakness over the past month.  - No known food allergies      CURRENT NUTRITION ORDERS  Diet Order:     NPO x2 days  Was asked to initiate TF via OG for patient    Current Intake/Tolerance:  None noted.  11/10: intubated   11/11: XR chest port, enteric tube terminates below the diaphragm     NUTRITION FOCUSED PHYSICAL ASSESSMENT FOR DIAGNOSING MALNUTRITION)  No:  Unable to visualize patient due to COVID-19 and social distancing restrictions          Observed:    N/A    Obtained from Chart/Interdisciplinary Team:  None noted.    ANTHROPOMETRICS  Height: 5' 8\"  Weight: 180 lbs 5.38 oz  Body mass index is 27.42 kg/m .  Weight Status:  Overweight BMI 25-29.9  IBW: 70 kg  % IBW: 117%  Weight History:   Wt Readings from Last 10 Encounters:   11/11/20 81.8 kg (180 lb 5.4 oz)   Weights from Office Visits  10/26/20 86.7 kg (191 lb)  01/20/20 95.8 kg (211 lb)    -  6% weight loss in <1 month  - " Reported 20 lbs (10% weight loss) weight loss over the last month per chart notes      LABS  Labs reviewed  K, Mg, Phos WNL - patient at risk for refeeding syndrome    MEDICATIONS  Medications reviewed  LR at 75/hr  Propofol at 9.8 mL/hr to provide 259 kcal (lipids)  Norepinephrine      ASSESSED NUTRITION NEEDS PER APPROVED PRACTICE GUIDELINES:    Dosing Weight 81.8 kg  Estimated Energy Needs: 8833-7106 kcals (25-30 Kcal/Kg)  Justification: maintenance  Estimated Protein Needs:  grams protein (1.2-1.5 g pro/Kg)  Justification: Repletion  Estimated Fluid Needs: 3242-5676  mL (1 mL/Kcal)  Justification: maintenance    MALNUTRITION:  % Weight Loss:  > 5% in 1 month (severe malnutrition)  % Intake:  Unable to determine  Subcutaneous Fat Loss: Unable to determine  Muscle Loss:  Unable to determine, but suspect  Fluid Retention:  None noted    Malnutrition Diagnosis: Unable to determine due to inability to perform Nutrition Focused Physical Exam    NUTRITION DIAGNOSIS:  Inadequate energy/protein intake related to intubation as evidenced by NPO status, Propofol meeting 13% of energy and 0% protein needs, and TF planned.      NUTRITION INTERVENTIONS  Recommendations / Nutrition Prescription  Begin Replete no fiber at 15 mL/hr for 12 hours; increase by 20 mL q 12 hrs to reach prelim goal 55 mL/hr to provide 1320 kcal, 84 g pro (1.0 g/kg), 1096 mL free H2O, 0 g fiber, 148 g CHO.  Total (TF + Propofol) = 1579 kcal (19 kcal/kg)  Free H2O 60 mL every 4 hours  Order Certavite    Recommend increase TF as tolerated as eventual final goal Replete no fiber at 80 mL/hr = 1920 kcal, 123 g pro (1.5 g/kg), 215 g CHO, 1613 mL H2O  Total (TF + Propofol) = 2180 kcal (27 kcal/kg)      Implementation  Nutrition education: Not appropriate at this time due to patient condition  Collaboration and Referral of Nutrition care - pt was discussed during ICU interdisciplinary rounds  EN Composition, EN Schedule, Feeding Tube Flush - Orders  entered as above.  .    Nutrition Goals  Patient will tolerate TF without refeeding syndrome.    MONITORING AND EVALUATION:  Progress towards goals will be monitored and evaluated per protocol and Practice Guidelines      Pign Sharma   Dietetic Intern

## 2020-11-11 NOTE — CONSULTS
[facility]    Stroke/Neurocritical Care Consult Note    Reason for Consult:  Progressive weakness    Chief Complaint: No chief complaint on file.       HPI  Roosevelt Burris is a 60 year old male with history of colon polyps, TBI, chronic dysarthria and left hemiparesis who is admitted to the ICU for respiratory failure after a one month course of weight loss and progressive weakness.    History is limited by intubation, but he is able to nod/shake appropriately to answer questions that he does not have any sensory loss, but he feels weak in his face and arms more so than his legs, and it is definitely worse at the end of the day and he functions better at the beginning of the day.    He endorses double vision which has also come on over the same time course.    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) versus traditional myasthenia gravis in the absence of an underlying malignancy     Recommendations  -Agree with CT C/A/P for occult malignancy (suspect lung adeno or small cell, also suspicious for colon as this can rarely be related) and to evaluate for thymoma  -Will add on antibody panel from serum (AChR binding, blocking, modulating, striational and Hardy autoimmune panel)  -MRI brain/C/T/L spine to rule out alternative etiology  -EMG when possible (ordered and discussed with Neuromuscular service through N)  -LP for CSF (labs ordered for you)  -Outside images pushed  If rep stim is consistent with MG, will propose initiation of PLEX at first availability  Further cares per Intensivist    Patient Follow-up    - final recommendation pending work-up    Thank you for this consult. We  "will continue to follow.     The Stroke Staff is Dr. Mcrae.    Vicki Burns MD  Vascular Neurology Fellow  To page me or covering stroke neurology team member, click here: [facility]   Choose \"On Call\" tab at top, then search dropdown box for \"Neurology Adult\", select location, press Enter, then look for stroke/neuro ICU/telestroke.    _____________________________________________________    Past Medical History   No past medical history on file.  Past Surgical History   No past surgical history on file.  Medications   Home Meds  Prior to Admission medications    Not on File       Scheduled Meds    azithromycin  500 mg Intravenous Q24H     chlorhexidine  15 mL Mouth/Throat Q12H     heparin ANTICOAGULANT  5,000 Units Subcutaneous Q8H     HYDROmorphone  0.3-0.5 mg Intravenous Once     pantoprazole (PROTONIX) IV  40 mg Intravenous Daily     piperacillin-tazobactam  3.375 g Intravenous Q6H     vancomycin (VANCOCIN) IV  1,500 mg Intravenous Q12H       Infusion Meds    lactated ringers 75 mL/hr at 11/10/20 2356     propofol (DIPRIVAN) infusion 15 mcg/kg/min (11/11/20 0600)     sodium chloride 10 mL/hr at 11/11/20 0055       PRN Meds  acetaminophen **OR** acetaminophen, albuterol, glucose **OR** dextrose **OR** glucagon, heparin lock flush, HYDROmorphone, lidocaine 4%, lidocaine (buffered or not buffered), midazolam, naloxone, ondansetron **OR** ondansetron, propofol (DIPRIVAN) infusion **AND** propofol **AND** CK total **AND** Triglycerides, senna-docusate **OR** senna-docusate, sodium chloride (PF)    Allergies   No Known Allergies  Family History   No family history on file.  Social History   Social History     Tobacco Use     Smoking status: Not on file   Substance Use Topics     Alcohol use: Not on file     Drug use: Not on file       Review of Systems   Review of systems not obtained due to patient factors - intubation       PHYSICAL EXAMINATION   Temp:  [98  F (36.7  C)-98.1  F (36.7  C)] 98  F (36.7  C)  Pulse:  " [47-96] 48  Resp:  [13-51] 16  BP: ()/() 80/53  FiO2 (%):  [40 %-50 %] 50 %  SpO2:  [90 %-100 %] 97 %    General:  patient lying in bed without any acute distress    HEENT:  normocephalic/atraumatic, intubated  Cardio:  RRR  Pulmonary:  on mechanical ventilation  Abdomen:  soft, non-tender, non-distended  Extremities:  no edema, peripheral pulses palpable  Skin:  intact, warm/dry     Neurologic  Mental Status:  examined after propofol 15 mcg/kg/min turned off for 5 minutes, briskly awakens, attends to voice and command. Follows complex commands, shakes/nods appropriately  Cranial Nerves:  VFF, PERRL, bilateral ptosis, L worse than right, fatiguable, unable to gaze R fully, able to gaze left and bury sclera, fatiguable upgaze, improves with ice-pack test, endorses diplopia with lateral vision, lower facial musculature obscured by ETT, unable to fully purse lips around tube, unable to maintain jaw open against resistance, shoulder shrug intact, tongue protrudes, obscured by ETT  Motor:  neck flexion/extension weakness 4/5, shoulder abduction 4-/5, elbow flexion 4/5, elbow extension 3/5, finger flexion 5/5, finger extension 3/5, finger abduction 3/5. Hip flexion 4/5, knee flexion 4+/5, knee extension 4+/5, ankle dorsiflexion 5/5, ankle plantarflexion 5/5  No fasciculations, twitches, or abnormal movements  Reflexes:  2+ intact and symmetric reflexes in bilateral biceps, brachioradialis, patellae, achilles, toes downgoing  Sensory:  light touch sensation intact and symmetric throughout upper and lower extremities  Coordination:  not tested  Station/Gait:  not tested    Imaging  I personally reviewed all imaging; relevant findings per HPI.    Labs Data   CBC  Recent Labs   Lab 11/11/20 0635 11/11/20  0020   WBC 12.6* 11.6*   RBC 4.44 4.44   HGB 13.3 13.3   HCT 41.2 41.6    221     Basic Metabolic Panel   Recent Labs   Lab 11/11/20 0635 11/11/20  0020    142   POTASSIUM 3.8 4.3   CHLORIDE 113*  113*   CO2 26 25   BUN 23 23   CR 0.95 0.91   GLC 77 86   JESSICA 7.9* 7.3*     Liver Panel  Recent Labs   Lab 11/11/20  0020   PROTTOTAL 5.2*   ALBUMIN 2.7*   BILITOTAL 0.3   ALKPHOS 55   AST 29   ALT 44     INR  No lab results found.   Lipid Profile  No lab results found.  A1C  No lab results found.  Troponin I  No results for input(s): TROPI in the last 168 hours.

## 2020-11-11 NOTE — PLAN OF CARE
Central line placed by  this morning. BP seems to be improving with L bolus.  Patient following commands and moving all extremities on low dose propofol. Adequate urine output. OG placed overnight-clamped until imaging back. Sister updated via phone.

## 2020-11-12 ENCOUNTER — APPOINTMENT (OUTPATIENT)
Dept: MRI IMAGING | Facility: CLINIC | Age: 60
DRG: 003 | End: 2020-11-12
Attending: STUDENT IN AN ORGANIZED HEALTH CARE EDUCATION/TRAINING PROGRAM
Payer: COMMERCIAL

## 2020-11-12 ENCOUNTER — APPOINTMENT (OUTPATIENT)
Dept: GENERAL RADIOLOGY | Facility: CLINIC | Age: 60
DRG: 003 | End: 2020-11-12
Attending: INTERNAL MEDICINE
Payer: COMMERCIAL

## 2020-11-12 ENCOUNTER — APPOINTMENT (OUTPATIENT)
Dept: GENERAL RADIOLOGY | Facility: CLINIC | Age: 60
DRG: 003 | End: 2020-11-12
Attending: STUDENT IN AN ORGANIZED HEALTH CARE EDUCATION/TRAINING PROGRAM
Payer: COMMERCIAL

## 2020-11-12 ENCOUNTER — APPOINTMENT (OUTPATIENT)
Dept: CT IMAGING | Facility: CLINIC | Age: 60
DRG: 003 | End: 2020-11-12
Attending: INTERNAL MEDICINE
Payer: COMMERCIAL

## 2020-11-12 LAB
ANION GAP SERPL CALCULATED.3IONS-SCNC: 4 MMOL/L (ref 3–14)
APPEARANCE CSF: CLEAR
BUN SERPL-MCNC: 20 MG/DL (ref 7–30)
CALCIUM SERPL-MCNC: 7.9 MG/DL (ref 8.5–10.1)
CANCER AG19-9 SERPL-ACNC: 10 U/ML (ref 0–37)
CHLORIDE SERPL-SCNC: 113 MMOL/L (ref 94–109)
CO2 SERPL-SCNC: 28 MMOL/L (ref 20–32)
COLOR CSF: COLORLESS
CREAT SERPL-MCNC: 1.04 MG/DL (ref 0.66–1.25)
GFR SERPL CREATININE-BSD FRML MDRD: 78 ML/MIN/{1.73_M2}
GLUCOSE BLDC GLUCOMTR-MCNC: 133 MG/DL (ref 70–99)
GLUCOSE BLDC GLUCOMTR-MCNC: 69 MG/DL (ref 70–99)
GLUCOSE BLDC GLUCOMTR-MCNC: 75 MG/DL (ref 70–99)
GLUCOSE BLDC GLUCOMTR-MCNC: 99 MG/DL (ref 70–99)
GLUCOSE CSF-MCNC: 49 MG/DL (ref 40–70)
GLUCOSE SERPL-MCNC: 75 MG/DL (ref 70–99)
GRAM STN SPEC: NORMAL
Lab: NORMAL
MAGNESIUM SERPL-MCNC: 2.5 MG/DL (ref 1.6–2.3)
MISCELLANEOUS TEST: NORMAL
PHOSPHATE SERPL-MCNC: 2.8 MG/DL (ref 2.5–4.5)
POTASSIUM SERPL-SCNC: 3.4 MMOL/L (ref 3.4–5.3)
PROT CSF-MCNC: 59 MG/DL (ref 15–60)
RBC # CSF MANUAL: 1 /UL (ref 0–2)
RBC # CSF MANUAL: NORMAL /UL (ref 0–2)
SODIUM SERPL-SCNC: 145 MMOL/L (ref 133–144)
SPECIMEN SOURCE: NORMAL
TUBE # CSF: 4 #
WBC # CSF MANUAL: 0 /UL (ref 0–5)
WBC # CSF MANUAL: NORMAL /UL (ref 0–5)

## 2020-11-12 PROCEDURE — 87015 SPECIMEN INFECT AGNT CONCNTJ: CPT | Performed by: STUDENT IN AN ORGANIZED HEALTH CARE EDUCATION/TRAINING PROGRAM

## 2020-11-12 PROCEDURE — 999N000009 HC STATISTIC AIRWAY CARE

## 2020-11-12 PROCEDURE — 250N000009 HC RX 250: Performed by: PHYSICIAN ASSISTANT

## 2020-11-12 PROCEDURE — 255N000002 HC RX 255 OP 636: Performed by: INTERNAL MEDICINE

## 2020-11-12 PROCEDURE — 99291 CRITICAL CARE FIRST HOUR: CPT | Mod: 25 | Performed by: INTERNAL MEDICINE

## 2020-11-12 PROCEDURE — 999N001017 HC STATISTIC GLUCOSE BY METER IP

## 2020-11-12 PROCEDURE — 009U3ZX DRAINAGE OF SPINAL CANAL, PERCUTANEOUS APPROACH, DIAGNOSTIC: ICD-10-PCS | Performed by: PHYSICIAN ASSISTANT

## 2020-11-12 PROCEDURE — 250N000011 HC RX IP 250 OP 636

## 2020-11-12 PROCEDURE — 88188 FLOWCYTOMETRY/READ 9-15: CPT | Performed by: PATHOLOGY

## 2020-11-12 PROCEDURE — 999N001136 HC STATISTIC FLOW INT 9-15 ABY TC 88188: Performed by: STUDENT IN AN ORGANIZED HEALTH CARE EDUCATION/TRAINING PROGRAM

## 2020-11-12 PROCEDURE — 258N000003 HC RX IP 258 OP 636: Performed by: INTERNAL MEDICINE

## 2020-11-12 PROCEDURE — 250N000011 HC RX IP 250 OP 636: Performed by: INTERNAL MEDICINE

## 2020-11-12 PROCEDURE — 71045 X-RAY EXAM CHEST 1 VIEW: CPT

## 2020-11-12 PROCEDURE — 999N000065 XR ABDOMEN PORT 1 VW

## 2020-11-12 PROCEDURE — 87529 HSV DNA AMP PROBE: CPT | Performed by: STUDENT IN AN ORGANIZED HEALTH CARE EDUCATION/TRAINING PROGRAM

## 2020-11-12 PROCEDURE — 02HV33Z INSERTION OF INFUSION DEVICE INTO SUPERIOR VENA CAVA, PERCUTANEOUS APPROACH: ICD-10-PCS | Performed by: INTERNAL MEDICINE

## 2020-11-12 PROCEDURE — 94003 VENT MGMT INPAT SUBQ DAY: CPT

## 2020-11-12 PROCEDURE — 72156 MRI NECK SPINE W/O & W/DYE: CPT

## 2020-11-12 PROCEDURE — 36556 INSERT NON-TUNNEL CV CATH: CPT | Mod: GC | Performed by: INTERNAL MEDICINE

## 2020-11-12 PROCEDURE — 05HN33Z INSERTION OF INFUSION DEVICE INTO LEFT INTERNAL JUGULAR VEIN, PERCUTANEOUS APPROACH: ICD-10-PCS | Performed by: INTERNAL MEDICINE

## 2020-11-12 PROCEDURE — 88108 CYTOPATH CONCENTRATE TECH: CPT | Mod: 26 | Performed by: PATHOLOGY

## 2020-11-12 PROCEDURE — 72157 MRI CHEST SPINE W/O & W/DYE: CPT

## 2020-11-12 PROCEDURE — 99291 CRITICAL CARE FIRST HOUR: CPT | Mod: GC | Performed by: PSYCHIATRY & NEUROLOGY

## 2020-11-12 PROCEDURE — 89050 BODY FLUID CELL COUNT: CPT | Performed by: STUDENT IN AN ORGANIZED HEALTH CARE EDUCATION/TRAINING PROGRAM

## 2020-11-12 PROCEDURE — 36514 APHERESIS PLASMA: CPT

## 2020-11-12 PROCEDURE — 88185 FLOWCYTOMETRY/TC ADD-ON: CPT | Mod: TC | Performed by: STUDENT IN AN ORGANIZED HEALTH CARE EDUCATION/TRAINING PROGRAM

## 2020-11-12 PROCEDURE — P9041 ALBUMIN (HUMAN),5%, 50ML: HCPCS | Performed by: INTERNAL MEDICINE

## 2020-11-12 PROCEDURE — 250N000009 HC RX 250: Performed by: INTERNAL MEDICINE

## 2020-11-12 PROCEDURE — 83735 ASSAY OF MAGNESIUM: CPT | Performed by: INTERNAL MEDICINE

## 2020-11-12 PROCEDURE — 62270 DX LMBR SPI PNXR: CPT

## 2020-11-12 PROCEDURE — C9113 INJ PANTOPRAZOLE SODIUM, VIA: HCPCS | Performed by: INTERNAL MEDICINE

## 2020-11-12 PROCEDURE — 999N000185 HC STATISTIC TRANSPORT TIME EA 15 MIN

## 2020-11-12 PROCEDURE — 250N000013 HC RX MED GY IP 250 OP 250 PS 637: Performed by: INTERNAL MEDICINE

## 2020-11-12 PROCEDURE — 84100 ASSAY OF PHOSPHORUS: CPT | Performed by: INTERNAL MEDICINE

## 2020-11-12 PROCEDURE — 999N000157 HC STATISTIC RCP TIME EA 10 MIN

## 2020-11-12 PROCEDURE — 200N000001 HC R&B ICU

## 2020-11-12 PROCEDURE — 999N000065 XR CHEST PORT 1 VW

## 2020-11-12 PROCEDURE — 88184 FLOWCYTOMETRY/ TC 1 MARKER: CPT | Mod: TC | Performed by: STUDENT IN AN ORGANIZED HEALTH CARE EDUCATION/TRAINING PROGRAM

## 2020-11-12 PROCEDURE — A9585 GADOBUTROL INJECTION: HCPCS | Performed by: INTERNAL MEDICINE

## 2020-11-12 PROCEDURE — 87205 SMEAR GRAM STAIN: CPT | Performed by: STUDENT IN AN ORGANIZED HEALTH CARE EDUCATION/TRAINING PROGRAM

## 2020-11-12 PROCEDURE — 72158 MRI LUMBAR SPINE W/O & W/DYE: CPT

## 2020-11-12 PROCEDURE — 70450 CT HEAD/BRAIN W/O DYE: CPT

## 2020-11-12 PROCEDURE — 999N001014 HC STATISTIC CYTO WRIGHT STAIN TC: Performed by: STUDENT IN AN ORGANIZED HEALTH CARE EDUCATION/TRAINING PROGRAM

## 2020-11-12 PROCEDURE — 82945 GLUCOSE OTHER FLUID: CPT | Performed by: STUDENT IN AN ORGANIZED HEALTH CARE EDUCATION/TRAINING PROGRAM

## 2020-11-12 PROCEDURE — 74177 CT ABD & PELVIS W/CONTRAST: CPT

## 2020-11-12 PROCEDURE — 88108 CYTOPATH CONCENTRATE TECH: CPT | Mod: TC | Performed by: STUDENT IN AN ORGANIZED HEALTH CARE EDUCATION/TRAINING PROGRAM

## 2020-11-12 PROCEDURE — 80048 BASIC METABOLIC PNL TOTAL CA: CPT | Performed by: INTERNAL MEDICINE

## 2020-11-12 PROCEDURE — 87070 CULTURE OTHR SPECIMN AEROBIC: CPT | Performed by: STUDENT IN AN ORGANIZED HEALTH CARE EDUCATION/TRAINING PROGRAM

## 2020-11-12 PROCEDURE — 272N000078 HC NUTRITION PRODUCT INTERMEDIATE LITER

## 2020-11-12 PROCEDURE — 84157 ASSAY OF PROTEIN OTHER: CPT | Performed by: STUDENT IN AN ORGANIZED HEALTH CARE EDUCATION/TRAINING PROGRAM

## 2020-11-12 RX ORDER — NICOTINE POLACRILEX 4 MG
15-30 LOZENGE BUCCAL
Status: DISCONTINUED | OUTPATIENT
Start: 2020-11-12 | End: 2020-11-12

## 2020-11-12 RX ORDER — DIPHENHYDRAMINE HYDROCHLORIDE 50 MG/ML
50 INJECTION INTRAMUSCULAR; INTRAVENOUS
Status: DISCONTINUED | OUTPATIENT
Start: 2020-11-13 | End: 2020-11-13

## 2020-11-12 RX ORDER — FENTANYL CITRATE 50 UG/ML
100 INJECTION, SOLUTION INTRAMUSCULAR; INTRAVENOUS ONCE
Status: COMPLETED | OUTPATIENT
Start: 2020-11-12 | End: 2020-11-12

## 2020-11-12 RX ORDER — CALCIUM GLUCONATE 94 MG/ML
1 INJECTION, SOLUTION INTRAVENOUS
Status: DISCONTINUED | OUTPATIENT
Start: 2020-11-12 | End: 2020-11-12

## 2020-11-12 RX ORDER — HEPARIN SODIUM 1000 [USP'U]/ML
3 INJECTION, SOLUTION INTRAVENOUS; SUBCUTANEOUS
Status: DISCONTINUED | OUTPATIENT
Start: 2020-11-12 | End: 2020-11-12

## 2020-11-12 RX ORDER — DEXTROSE MONOHYDRATE, SODIUM CHLORIDE, AND POTASSIUM CHLORIDE 50; 1.49; 4.5 G/1000ML; G/1000ML; G/1000ML
INJECTION, SOLUTION INTRAVENOUS CONTINUOUS
Status: DISCONTINUED | OUTPATIENT
Start: 2020-11-12 | End: 2020-11-15

## 2020-11-12 RX ORDER — PROPOFOL 10 MG/ML
10-20 INJECTION, EMULSION INTRAVENOUS EVERY 30 MIN PRN
Status: DISCONTINUED | OUTPATIENT
Start: 2020-11-12 | End: 2020-11-14

## 2020-11-12 RX ORDER — DIPHENHYDRAMINE HYDROCHLORIDE 50 MG/ML
50 INJECTION INTRAMUSCULAR; INTRAVENOUS
Status: DISCONTINUED | OUTPATIENT
Start: 2020-11-13 | End: 2020-11-12

## 2020-11-12 RX ORDER — CALCIUM GLUCONATE 94 MG/ML
1 INJECTION, SOLUTION INTRAVENOUS
Status: DISCONTINUED | OUTPATIENT
Start: 2020-11-13 | End: 2020-11-12

## 2020-11-12 RX ORDER — DEXTROSE MONOHYDRATE 25 G/50ML
25-50 INJECTION, SOLUTION INTRAVENOUS
Status: DISCONTINUED | OUTPATIENT
Start: 2020-11-12 | End: 2020-11-12

## 2020-11-12 RX ORDER — ALBUMIN HUMAN 25 %
3000 INTRAVENOUS SOLUTION INTRAVENOUS
Status: DISCONTINUED | OUTPATIENT
Start: 2020-11-13 | End: 2020-11-12

## 2020-11-12 RX ORDER — IOPAMIDOL 755 MG/ML
91 INJECTION, SOLUTION INTRAVASCULAR ONCE
Status: COMPLETED | OUTPATIENT
Start: 2020-11-12 | End: 2020-11-12

## 2020-11-12 RX ORDER — CALCIUM GLUCONATE 94 MG/ML
1 INJECTION, SOLUTION INTRAVENOUS
Status: DISCONTINUED | OUTPATIENT
Start: 2020-11-12 | End: 2020-11-13

## 2020-11-12 RX ORDER — FENTANYL CITRATE 50 UG/ML
INJECTION, SOLUTION INTRAMUSCULAR; INTRAVENOUS
Status: COMPLETED
Start: 2020-11-12 | End: 2020-11-12

## 2020-11-12 RX ORDER — DIPHENHYDRAMINE HYDROCHLORIDE 50 MG/ML
50 INJECTION INTRAMUSCULAR; INTRAVENOUS
Status: DISCONTINUED | OUTPATIENT
Start: 2020-11-12 | End: 2020-11-12

## 2020-11-12 RX ORDER — ALBUMIN HUMAN 25 %
2800 INTRAVENOUS SOLUTION INTRAVENOUS
Status: COMPLETED | OUTPATIENT
Start: 2020-11-13 | End: 2020-11-13

## 2020-11-12 RX ORDER — ALBUMIN HUMAN 25 %
4250 INTRAVENOUS SOLUTION INTRAVENOUS
Status: COMPLETED | OUTPATIENT
Start: 2020-11-12 | End: 2020-11-12

## 2020-11-12 RX ORDER — PROPOFOL 10 MG/ML
5-75 INJECTION, EMULSION INTRAVENOUS CONTINUOUS
Status: DISCONTINUED | OUTPATIENT
Start: 2020-11-12 | End: 2020-11-14

## 2020-11-12 RX ORDER — CALCIUM GLUCONATE 94 MG/ML
1 INJECTION, SOLUTION INTRAVENOUS
Status: DISCONTINUED | OUTPATIENT
Start: 2020-11-13 | End: 2020-11-13

## 2020-11-12 RX ORDER — HEPARIN SODIUM 1000 [USP'U]/ML
3 INJECTION, SOLUTION INTRAVENOUS; SUBCUTANEOUS
Status: DISCONTINUED | OUTPATIENT
Start: 2020-11-13 | End: 2020-11-13

## 2020-11-12 RX ORDER — GADOBUTROL 604.72 MG/ML
8 INJECTION INTRAVENOUS ONCE
Status: COMPLETED | OUTPATIENT
Start: 2020-11-12 | End: 2020-11-12

## 2020-11-12 RX ORDER — ATROPINE SULFATE 0.1 MG/ML
INJECTION INTRAVENOUS
Status: COMPLETED
Start: 2020-11-12 | End: 2020-11-12

## 2020-11-12 RX ADMIN — HYDROMORPHONE HYDROCHLORIDE 0.5 MG: 1 INJECTION, SOLUTION INTRAMUSCULAR; INTRAVENOUS; SUBCUTANEOUS at 15:30

## 2020-11-12 RX ADMIN — ALBUMIN HUMAN 4250 ML: 0.05 INJECTION, SOLUTION INTRAVENOUS at 20:45

## 2020-11-12 RX ADMIN — POTASSIUM CHLORIDE, DEXTROSE MONOHYDRATE AND SODIUM CHLORIDE: 150; 5; 450 INJECTION, SOLUTION INTRAVENOUS at 16:08

## 2020-11-12 RX ADMIN — ATROPINE SULFATE 0.5 MG: 0.1 INJECTION PARENTERAL at 19:11

## 2020-11-12 RX ADMIN — PROPOFOL 20 MCG/KG/MIN: 10 INJECTION, EMULSION INTRAVENOUS at 04:50

## 2020-11-12 RX ADMIN — PROPOFOL 20 MCG/KG/MIN: 10 INJECTION, EMULSION INTRAVENOUS at 20:53

## 2020-11-12 RX ADMIN — GADOBUTROL 8 ML: 604.72 INJECTION INTRAVENOUS at 02:34

## 2020-11-12 RX ADMIN — CHLORHEXIDINE GLUCONATE 0.12% ORAL RINSE 15 ML: 1.2 LIQUID ORAL at 20:44

## 2020-11-12 RX ADMIN — CALCIUM GLUCONATE 5 G: 98 INJECTION, SOLUTION INTRAVENOUS at 20:45

## 2020-11-12 RX ADMIN — AMPICILLIN AND SULBACTAM 3 G: 2; 1 INJECTION, POWDER, FOR SOLUTION INTRAMUSCULAR; INTRAVENOUS at 15:31

## 2020-11-12 RX ADMIN — AMPICILLIN AND SULBACTAM 3 G: 2; 1 INJECTION, POWDER, FOR SOLUTION INTRAMUSCULAR; INTRAVENOUS at 21:17

## 2020-11-12 RX ADMIN — CHLORHEXIDINE GLUCONATE 0.12% ORAL RINSE 15 ML: 1.2 LIQUID ORAL at 08:03

## 2020-11-12 RX ADMIN — SODIUM CHLORIDE 68 ML: 9 INJECTION, SOLUTION INTRAVENOUS at 03:00

## 2020-11-12 RX ADMIN — MIDAZOLAM HYDROCHLORIDE 4 MG: 1 INJECTION, SOLUTION INTRAMUSCULAR; INTRAVENOUS at 16:11

## 2020-11-12 RX ADMIN — SODIUM CHLORIDE: 9 INJECTION, SOLUTION INTRAVENOUS at 15:36

## 2020-11-12 RX ADMIN — FENTANYL CITRATE 100 MCG: 50 INJECTION, SOLUTION INTRAMUSCULAR; INTRAVENOUS at 16:13

## 2020-11-12 RX ADMIN — HYDROMORPHONE HYDROCHLORIDE 0.5 MG: 1 INJECTION, SOLUTION INTRAMUSCULAR; INTRAVENOUS; SUBCUTANEOUS at 18:10

## 2020-11-12 RX ADMIN — MIDAZOLAM HYDROCHLORIDE 2 MG: 1 INJECTION, SOLUTION INTRAMUSCULAR; INTRAVENOUS at 17:38

## 2020-11-12 RX ADMIN — SODIUM CHLORIDE 500 MG: 9 INJECTION, SOLUTION INTRAVENOUS at 17:50

## 2020-11-12 RX ADMIN — LIDOCAINE HYDROCHLORIDE 3 ML: 10 INJECTION, SOLUTION EPIDURAL; INFILTRATION; INTRACAUDAL; PERINEURAL at 12:32

## 2020-11-12 RX ADMIN — IOPAMIDOL 91 ML: 755 INJECTION, SOLUTION INTRAVENOUS at 03:00

## 2020-11-12 RX ADMIN — PANTOPRAZOLE SODIUM 40 MG: 40 INJECTION, POWDER, FOR SOLUTION INTRAVENOUS at 08:03

## 2020-11-12 RX ADMIN — AMPICILLIN AND SULBACTAM 3 G: 2; 1 INJECTION, POWDER, FOR SOLUTION INTRAMUSCULAR; INTRAVENOUS at 09:55

## 2020-11-12 RX ADMIN — PROPOFOL 20 MCG/KG/MIN: 10 INJECTION, EMULSION INTRAVENOUS at 15:18

## 2020-11-12 RX ADMIN — AMPICILLIN AND SULBACTAM 3 G: 2; 1 INJECTION, POWDER, FOR SOLUTION INTRAMUSCULAR; INTRAVENOUS at 03:58

## 2020-11-12 ASSESSMENT — ACTIVITIES OF DAILY LIVING (ADL)
ADLS_ACUITY_SCORE: 24

## 2020-11-12 ASSESSMENT — MIFFLIN-ST. JEOR
SCORE: 1643.5
SCORE: 1643.37

## 2020-11-12 NOTE — PROGRESS NOTES
Patient transported to Radiology on transport vent. no Complications. Patient placed on following vent settings:    Ventilation Mode: CMV/AC  (Continuous Mandatory Ventilation/ Assist Control)  FiO2 (%): 50 %  Rate Set (breaths/minute): 16 breaths/min  Tidal Volume Set (mL): 450 mL  PEEP (cm H2O): 5 cmH2O  Pressure Support (cm H2O): 10 cmH2O  Oxygen Concentration (%): 30 %  Resp: 25        Celena Callejas, RT

## 2020-11-12 NOTE — PROCEDURES
[facility]    Central line    Date/Time: 11/12/2020 5:15 PM  Performed by: Vicki Burns MD  Authorized by: Vicki Burns MD   Indications: vascular access    UNIVERSAL PROTOCOL   Site Marked: Yes  Prior Images Obtained and Reviewed:  Yes  Required items: Required blood products, implants, devices and special equipment available    Patient identity confirmed:  Verbally with patient, hospital-assigned identification number and arm band  NA - No sedation, light sedation, or local anesthesia  Confirmation Checklist:  Patient's identity using two indicators, relevant allergies, procedure was appropriate and matched the consent or emergent situation and correct equipment/implants were available  Time out: Immediately prior to the procedure a time out was called    Universal Protocol: the Joint Commission Universal Protocol was followed    Preparation: Patient was prepped and draped in usual sterile fashion    ESBL (mL):  0        SEDATION    Patient Sedated: No      Preparation: skin prepped with 2% chlorhexidine  Skin prep agent dried: skin prep agent completely dried prior to procedure  Sterile barriers: all five maximum sterile barriers used - cap, mask, sterile gown, sterile gloves, and large sterile sheet  Hand hygiene: hand hygiene performed prior to central venous catheter insertion  Patient position: Trendelenburg  Catheter type: triple lumen  Catheter size: 7 Fr  Pre-procedure: landmarks identified  Ultrasound guidance: yes  Sterile ultrasound techniques: sterile gel and sterile probe covers were used  Number of attempts: 1  Successful placement: yes  Post-procedure: line sutured and dressing applied  Assessment: blood return through all ports,  placement verified by x-ray and no pneumothorax on x-ray    PROCEDURE   Patient Tolerance:  Patient tolerated the procedure well with no immediate complications    Length of time physician/provider present for 1:1 monitoring  during sedation: 0    Vicki Burns MD  Vascular Neurology Fellow, PGY-5

## 2020-11-12 NOTE — PLAN OF CARE
Neuro- Sedated, able to follow commands appropriately.   Most Recent Vitals- Temp: 98.1  F (36.7  C) Temp src: Axillary BP: 105/78 Pulse: (!) 47   Resp: 16 SpO2: 100 % O2 Device: Mechanical Ventilator    Tele - SB lowest mid 30's-received 0.5 atropine, maintaining 40's-50's  Cardiac- WNL  Resp- Course LS, small to scant clear secretions, MD trialed pt on pressure support at 7/5 with pt lasting 10 min before becoming tachypneic.  O2- Mech vent, FI02 decreased from 60 to 40  Activity- A/2 Q2H turn, bedrest  Pain- Nasal, throat discomfort with NG insertion/ET tube.   Drips- Propofol 20, levo held since 1400, LR at 150 for 1000, than 75  Drains/Tubes- R internal jugular, PIV L hand, [place]  Plan- Remain on vent overnight and reassess in AM. Atropine for HR <40, monitor BP, Unasyn, imaging to be completed overnight, Neuro following, monitor UO.  Misc- Pt's had low UO for last 4 hours despite 1000 LR this shift, pt had 75 last 2 hrs, continue to monitor. TF initiated @ 1700 through NG at 15 mL/hr, tolerating appropriately.    Barry Pete RN

## 2020-11-12 NOTE — PROGRESS NOTES
Spoke with neurology re: MG diagnosis. In addition to plasmapheresis, will give Solumedrol 500 mg BID x 5 days. Orders placed.     Dick Clement MD  Pulmonary & Critical Care Medicine  [facility]   Pager: [phone]

## 2020-11-12 NOTE — PROGRESS NOTES
[facility] ICU RESPIRATORY NOTE  Date of Admission: 11/10/20  Date of Intubation (most recent): 11/10/20  Reason for Mechanical Ventilation: Resp. failure  Number of Days on Mechanical Ventilation: 3  Met Criteria for Pressure Support Trial:   Length of Pressure Support Trial:    Reason for Stopping Pressure Support Trial:  Reason for No Pressure Support Trial: per MD     Significant Events Today: Pt to MRI, CT overnight     ABG Results:   Recent Labs   Lab 11/11/20  0635   O2PER 60     Ventilation Mode: CMV/AC  (Continuous Mandatory Ventilation/ Assist Control)  FiO2 (%): 50 %  Rate Set (breaths/minute): 16 breaths/min  Tidal Volume Set (mL): 450 mL  PEEP (cm H2O): 5 cmH2O  Oxygen Concentration (%): 40 %  Resp: 16    Joao Smallwood, RT

## 2020-11-12 NOTE — PROGRESS NOTES
[facility] ICU RESPIRATORY NOTE        Date of Admission: 11/10/2020    Date of Intubation (most recent): 11/10/2020    Reason for Mechanical Ventilation: Resp failure    Number of Days on Mechanical Ventilation: 3    Met Criteria for Spontaneous Breathing Trial: Yes    Reason for No Spontaneous Breathing Trial:    Significant Events Today: SBT 8/5 30%, pt had an increase in RR and had low Vt.  Changed pt to 10/5 for SBT patient had better Vt and RR decreased.  Pt had indicated that he was SOB. Pt put back on full support.  SBT lasted for 20 minutes.    ABG Results:   Recent Labs   Lab 11/11/20  0635   O2PER 60       Current Vent Settings: Ventilation Mode: CMV/AC  (Continuous Mandatory Ventilation/ Assist Control)  FiO2 (%): 30 %  Rate Set (breaths/minute): 16 breaths/min  Tidal Volume Set (mL): 450 mL  PEEP (cm H2O): 5 cmH2O  Pressure Support (cm H2O): 10 cmH2O  Oxygen Concentration (%): 30 %  Resp: 16          Plan: Wean as tolerated    Adelso Valdez

## 2020-11-12 NOTE — PLAN OF CARE
Pt lethargic, but alert and following commands on low dose propofol. Able to make needs known/use nurse call.   SB, no atropine this shift. Levo remains off.   Down to XR for LP without issue.   LS coarse, no vent changes today. Did not tolerate PS  Feeding tube removed, waiting for MD to decide if to replace.   Coccyx blanchable redness, mepilex in place.   Adequate UO.   Plan for HD cath placement this afternoon, at bedside.   TavorPiper to arrive after 3p.

## 2020-11-12 NOTE — PROGRESS NOTES
TF alarming pump of down stream occlusion this morning. Unable to aspirate and flush; feeding on hold.   With oral care, 45cm visible, tube coiled in mouth. Removed for safety concerns. MD updated.

## 2020-11-12 NOTE — PROGRESS NOTES
Called by neurocritical care and pt may have LP tomorrow; subcutaneous heparin orders held.     Raina Conteh MD

## 2020-11-12 NOTE — PROCEDURES
[facility]    Procedure: Lumbar puncture    Date/Time: 11/12/2020 12:49 PM  Performed by: Lois Romeo PA-C  Authorized by: Lois Romeo PA-C     UNIVERSAL PROTOCOL   Site Marked: Yes  Prior Images Obtained and Reviewed:  Yes  Required items: Required blood products, implants, devices and special equipment available    Patient identity confirmed:  Arm band and provided demographic data  NA - No sedation, light sedation, or local anesthesia  Confirmation Checklist:  Patient's identity using two indicators, relevant allergies, procedure was appropriate and matched the consent or emergent situation and correct equipment/implants were available  Time out: Immediately prior to the procedure a time out was called    Universal Protocol: the Joint Commission Universal Protocol was followed    Preparation: Patient was prepped and draped in usual sterile fashion           ANESTHESIA    Anesthesia: Local infiltration  Local Anesthetic:  Lidocaine 1% without epinephrine  Anesthetic Total (mL):  3      SEDATION    Patient Sedated: No    See dictated procedure note for full details.  PROCEDURE   Patient Tolerance:  Patient tolerated the procedure well with no immediate complications  Describe Procedure: Fluoroscopic Guided Lumbar Puncture  12.5 mL clear CSF obtained  Opening pressure 18 cm H2O  Patient was accompanied by ICU nurse and respiratory team as he is intubated and sedated.  Length of time physician/provider present for 1:1 monitoring during sedation: 0
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
## 2020-11-12 NOTE — PROGRESS NOTES
"  [facility]    Stroke/Neurocritical Care Progress Note    Interval Events  Hypotensive requiring pressors and bradycardic requiring atropine overnight. MRI neuroaxis obtained, no findings to explain presentation. CT C/A/P obtained but not yet interpreted by Radiology. SubQ heparin held in light of impending LP. EMG planned for today.    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    Plan  Await CT read  EMG today  If there are electrodiagnostic findings consistent with myasthenia, will propose PLEX ASAP  LP when IR can perform  Remainder of cares per Intensivist    The Stroke Staff is Dr. Mcrae.    Vicki Burns MD  Vascular Neurology Fellow  To page me or covering stroke neurology team member, click here: [facility]COM   Choose \"On Call\" tab at top, then search dropdown box for \"Neurology Adult\", select location, press Enter, then look for stroke/neuro ICU/telestroke.    ______________________________________________________    Medications   Home Meds  Prior to Admission medications    Medication Sig Start Date End Date Taking? Authorizing Provider   amoxicillin-clavulanate (AUGMENTIN) 875-125 MG tablet Take 1 tablet by mouth 2 times daily X 10 days (started 11/9/2020)   Yes Unknown, Entered By History   HYDROcodone-acetaminophen (NORCO) 5-325 MG tablet Take 1-2 tablets by mouth 2 times daily as needed for severe pain    Yes Unknown, Entered By History   predniSONE (DELTASONE) 20 MG tablet Take 20 mg by mouth 2 times daily X 5 days (started 11/9/2020)   Yes Unknown, Entered By History   tamsulosin (FLOMAX) 0.4 MG capsule Take 0.4 mg by mouth daily   Yes Unknown, Entered By History       Scheduled Meds    ampicillin-sulbactam (UNASYN) IV  3 g " Intravenous Q6H     chlorhexidine  15 mL Mouth/Throat Q12H     [Held by provider] heparin ANTICOAGULANT  5,000 Units Subcutaneous Q8H     HYDROmorphone  0.3-0.5 mg Intravenous Once     multivitamins w/minerals  15 mL Per Feeding Tube Daily     pantoprazole (PROTONIX) IV  40 mg Intravenous Daily     sodium chloride 0.9 %  68 mL Intravenous Once       Infusion Meds    lactated ringers 75 mL/hr at 11/12/20 0355     norepinephrine Stopped (11/11/20 2246)     propofol (DIPRIVAN) infusion 20 mcg/kg/min (11/12/20 0450)     sodium chloride 0.9%       sodium chloride 10 mL/hr at 11/12/20 0355       PRN Meds  acetaminophen **OR** acetaminophen, albuterol, glucose **OR** dextrose **OR** glucagon, heparin lock flush, HYDROmorphone, lidocaine 4%, lidocaine (buffered or not buffered), midazolam, naloxone, ondansetron **OR** ondansetron, propofol (DIPRIVAN) infusion **AND** propofol **AND** CK total **AND** Triglycerides, senna-docusate **OR** senna-docusate, sodium chloride (PF), sodium chloride 0.9%       PHYSICAL EXAMINATION  Temp:  [97  F (36.1  C)-98.3  F (36.8  C)] 98.3  F (36.8  C)  Pulse:  [40-77] 53  Resp:  [13-22] 17  BP: ()/() 133/97  FiO2 (%):  [40 %-60 %] 50 %  SpO2:  [92 %-100 %] 99 %     General:  patient lying in bed without any acute distress    HEENT:  normocephalic/atraumatic, intubated  Cardio:  RRR  Pulmonary:  on mechanical ventilation  Abdomen:  soft, non-tender, non-distended  Extremities:  no edema, peripheral pulses palpable  Skin:  intact, warm/dry      Neurologic  Mental Status:  examined on propofol 20 mcg/kg/min, briskly awakens, attends to voice and command. Follows complex commands, shakes/nods appropriately  Cranial Nerves:  VFF, PERRL, bilateral ptosis, L worse than right, dysconjugate rest gaze, lower facial musculature obscured by ETT, unable to fully purse lips around tube,  tongue protrudes, obscured by ETT  Motor: elbow flexion 4/5, elbow extension 3/5, finger flexion 5/5, finger  extension 3/5. Hip flexion 4/5, knee flexion 4+/5, knee extension 4+/5, ankle dorsiflexion 5/5, ankle plantarflexion 5/5. No fasciculations, twitches, or abnormal movements.  Reflexes:  2+ intact and symmetric reflexes in bilateral biceps, brachioradialis, patellae, achilles, toes downgoing  Sensory:  light touch sensation intact and symmetric throughout upper and lower extremities  Coordination:  not tested  Station/Gait:  not tested    Imaging  I personally reviewed all imaging; relevant findings per HPI.     Lab Results Data   CBC  Recent Labs   Lab 11/11/20  0635 11/11/20  0020   WBC 12.6* 11.6*   RBC 4.44 4.44   HGB 13.3 13.3   HCT 41.2 41.6    221     Basic Metabolic Panel    Recent Labs   Lab 11/12/20  0525 11/11/20  0635 11/11/20  0020   * 144 142   POTASSIUM 3.4 3.8 4.3   CHLORIDE 113* 113* 113*   CO2 28 26 25   BUN 20 23 23   CR 1.04 0.95 0.91   GLC 75 77 86   JESSICA 7.9* 7.9* 7.3*     Liver Panel  Recent Labs   Lab 11/11/20  0020   PROTTOTAL 5.2*   ALBUMIN 2.7*   BILITOTAL 0.3   ALKPHOS 55   AST 29   ALT 44     INR  No lab results found.   Lipid Profile  No lab results found.  A1C  No lab results found.  Troponin I  No results for input(s): TROPI in the last 168 hours.

## 2020-11-12 NOTE — CONSULTS
Consult Date:  11/12/2020      REQUESTING PHYSICIAN:  Edward Vila MD      REASON FOR CONSULTATION:  Initiation of plasma exchange treatment.      HISTORY OF PRESENT ILLNESS:  This is a 60-year-old who presented on 11/10/2020 with weakness which has been progressive.  He had double vision and weight loss and developed respiratory failure.  He was intubated.  He was felt to have perhaps aspiration pneumonia.  He was seen by Neurology who felt he had myasthenia gravis and asked us to do urgent plasma exchange treatment.  Overnight, he was hypotensive and bradycardic and was treated with IV fluids and pressors, which he is off now.  He has been treated for aspiration pneumonia with vancomycin and Zosyn initially and now Unasyn.  He remains on lactated Ringer's IV fluid maintenance.      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  The patient is not able to give any further history.      MEDICATIONS:  Unasyn and lactated Ringer's.      SOCIAL  HISTORY:  The patient is unable.      REVIEW OF SYSTEMS:  The patient is unable.      PHYSICAL EXAMINATION:   GENERAL:  He is lying in bed.  He is sedated.  He is intubated.  He responds somewhat with movement of the eyes and head during the exam.  He weighs 85.4 kilograms.   VITAL SIGNS:  Blood pressure 124/84, heart rate 55, respiratory rate 25, afebrile.  Intake and output yesterday was 6030 mL in and 1125 mL out.   HEENT:  Head shows no trauma currently.  Pupils are round and reactive to light.  Mouth shows he is intubated.   NECK:  Supple.   LUNGS:  Clear breath sounds anteriorly.   CARDIAC:  Regular rhythm, normal S1, S2, no murmur.   ABDOMEN:  Has normal bowel sounds, it is soft and nontender.   GENITOURINARY:  Weston catheter.   EXTREMITIES:  Normal nails, joints and pulses throughout.  No edema.   NEUROLOGIC:  He does not move during the exam much other than his head and eyes.  It appears to be nonfocal.      LABORATORY DATA:   Laboratories today's potassium 3.4, bicarbonate 28, BUN 20, creatinine 1.04.  On admission, his creatinine was 0.91.  His albumin is 3.3 and calcium 7.3, magnesium 2.2, phosphorus 3.3, hemoglobin 13.3.      IMPRESSION:   1.  Renal.  We will continue hydration, but switch him to D5 half normal saline with 20 of potassium.  His sodium is trending a little bit higher in potassium and low, so this should help with that.  We will avoid further IV contrast to avoid acute kidney injury.  He is no longer on vancomycin.  We will follow his laboratories and intake and output closely.   2.  Respiratory failure, mechanical ventilation.  He is on antibiotics and the ICU team is managing his ventilator.   3.  Cardiovascular collapse with bradycardia and hypotension.  This is improved now.  He is still somewhat bradycardic with a heart rate of 55.  His blood pressure is 124/84.  Again, we will continue IV hydration.   4.  Myasthenia gravis.  We have been asked by the Neurology Service to provide urgent plasmapheresis.  I have discussed the case with the ICU team and the pheresis nurses.  The ICU team will place a Marvin catheter and then we will proceed to plasma exchange.  We will do 1.5 plasma volume exchanges today replacing with albumin and calcium as needed.  We will do another exchange tomorrow for 1 plasma volume and after that, we will decide on a p.r.n. basis.  Further treatment of myasthenia gravis will be per the Neurology Service.   5.  Aspiration pneumonia.  He is on antibiotics and this will be followed.         ROLO MOLINA MD             D: 2020   T: 2020   MT:       Name:     BERNABE RODRIGUEZ   MRN:      [id number]        Account:       [id number]   :      1960           Consult Date:  2020      Document: [id number]       cc: Kaiden Molina MD

## 2020-11-12 NOTE — PROCEDURES
[facility]    Central line    Date/Time: 11/12/2020 5:54 PM  Performed by: Vicki Burns MD  Authorized by: Vicki Burns MD   Indications: vascular access (Apheresis)    UNIVERSAL PROTOCOL   Site Marked: Yes  Prior Images Obtained and Reviewed:  Yes  Required items: Required blood products, implants, devices and special equipment available    Patient identity confirmed:  Arm band, hospital-assigned identification number and verbally with patient  NA - No sedation, light sedation, or local anesthesia  Confirmation Checklist:  Patient's identity using two indicators, relevant allergies, procedure was appropriate and matched the consent or emergent situation and correct equipment/implants were available  Time out: Immediately prior to the procedure a time out was called    Universal Protocol: the Joint Commission Universal Protocol was followed    Preparation: Patient was prepped and draped in usual sterile fashion    ESBL (mL):  0        SEDATION    Patient Sedated: No      Preparation: skin prepped with 2% chlorhexidine  Skin prep agent dried: skin prep agent completely dried prior to procedure  Sterile barriers: all five maximum sterile barriers used - cap, mask, sterile gown, sterile gloves, and large sterile sheet  Hand hygiene: hand hygiene performed prior to central venous catheter insertion  Patient position: Trendelenburg  Catheter type: double lumen  Catheter size: 8 Fr  Pre-procedure: landmarks identified  Number of attempts: 1  Successful placement: yes  Post-procedure: line sutured and dressing applied  Assessment: blood return through all ports and placement verified by x-ray    PROCEDURE   Patient Tolerance:  Patient tolerated the procedure well with no immediate complications     Previously placed R internal jugular CVC cleaned and draped into sterile field. Seldinger technique employed to pass guidewire and remove catheter with wire left in place. Dilation  performed, apheresis catheter advanced onto guidewire and passed easily with satisfactory blood return and flushing from both ports.  Length of time physician/provider present for 1:1 monitoring during sedation: 0      Vicki Burns MD  Vascular Neurology Fellow, PGY-5

## 2020-11-13 LAB
ANION GAP SERPL CALCULATED.3IONS-SCNC: 4 MMOL/L (ref 3–14)
BACTERIA SPEC CULT: NORMAL
BUN SERPL-MCNC: 15 MG/DL (ref 7–30)
CALCIUM SERPL-MCNC: 7.9 MG/DL (ref 8.5–10.1)
CHLORIDE SERPL-SCNC: 112 MMOL/L (ref 94–109)
CO2 SERPL-SCNC: 26 MMOL/L (ref 20–32)
COPATH REPORT: NORMAL
COPATH REPORT: NORMAL
CREAT SERPL-MCNC: 0.82 MG/DL (ref 0.66–1.25)
ERYTHROCYTE [DISTWIDTH] IN BLOOD BY AUTOMATED COUNT: 13.4 % (ref 10–15)
GFR SERPL CREATININE-BSD FRML MDRD: >90 ML/MIN/{1.73_M2}
GLUCOSE BLDC GLUCOMTR-MCNC: 193 MG/DL (ref 70–99)
GLUCOSE BLDC GLUCOMTR-MCNC: 203 MG/DL (ref 70–99)
GLUCOSE BLDC GLUCOMTR-MCNC: 214 MG/DL (ref 70–99)
GLUCOSE BLDC GLUCOMTR-MCNC: 224 MG/DL (ref 70–99)
GLUCOSE BLDC GLUCOMTR-MCNC: 267 MG/DL (ref 70–99)
GLUCOSE SERPL-MCNC: 202 MG/DL (ref 70–99)
HCT VFR BLD AUTO: 39.2 % (ref 40–53)
HGB BLD-MCNC: 13.1 G/DL (ref 13.3–17.7)
HSV1 DNA CSF QL NAA+PROBE: NOT DETECTED
HSV2 DNA CSF QL NAA+PROBE: NOT DETECTED
INTERPRETATION ECG - MUSE: NORMAL
MAGNESIUM SERPL-MCNC: 2.2 MG/DL (ref 1.6–2.3)
MCH RBC QN AUTO: 30 PG (ref 26.5–33)
MCHC RBC AUTO-ENTMCNC: 33.4 G/DL (ref 31.5–36.5)
MCV RBC AUTO: 90 FL (ref 78–100)
MICROBIOLOGIST REVIEW: NORMAL
PHOSPHATE SERPL-MCNC: 3.9 MG/DL (ref 2.5–4.5)
PLATELET # BLD AUTO: 179 10E9/L (ref 150–450)
POTASSIUM SERPL-SCNC: 3.8 MMOL/L (ref 3.4–5.3)
RBC # BLD AUTO: 4.36 10E12/L (ref 4.4–5.9)
SODIUM SERPL-SCNC: 142 MMOL/L (ref 133–144)
SPECIMEN SOURCE: NORMAL
TSH SERPL DL<=0.005 MIU/L-ACNC: 0.34 MU/L (ref 0.4–4)
WBC # BLD AUTO: 7 10E9/L (ref 4–11)

## 2020-11-13 PROCEDURE — 94003 VENT MGMT INPAT SUBQ DAY: CPT

## 2020-11-13 PROCEDURE — 99291 CRITICAL CARE FIRST HOUR: CPT | Mod: GC | Performed by: PSYCHIATRY & NEUROLOGY

## 2020-11-13 PROCEDURE — 85027 COMPLETE CBC AUTOMATED: CPT | Performed by: INTERNAL MEDICINE

## 2020-11-13 PROCEDURE — C9113 INJ PANTOPRAZOLE SODIUM, VIA: HCPCS | Performed by: INTERNAL MEDICINE

## 2020-11-13 PROCEDURE — 93005 ELECTROCARDIOGRAM TRACING: CPT

## 2020-11-13 PROCEDURE — 250N000011 HC RX IP 250 OP 636: Performed by: INTERNAL MEDICINE

## 2020-11-13 PROCEDURE — 999N001017 HC STATISTIC GLUCOSE BY METER IP

## 2020-11-13 PROCEDURE — 999N000157 HC STATISTIC RCP TIME EA 10 MIN

## 2020-11-13 PROCEDURE — 258N000003 HC RX IP 258 OP 636: Performed by: INTERNAL MEDICINE

## 2020-11-13 PROCEDURE — 36514 APHERESIS PLASMA: CPT

## 2020-11-13 PROCEDURE — 272N000083 HC NUTRITION PRODUCT SEMIELEM INTERMED LITER

## 2020-11-13 PROCEDURE — 250N000011 HC RX IP 250 OP 636

## 2020-11-13 PROCEDURE — 200N000001 HC R&B ICU

## 2020-11-13 PROCEDURE — 99291 CRITICAL CARE FIRST HOUR: CPT | Performed by: INTERNAL MEDICINE

## 2020-11-13 PROCEDURE — 272N000078 HC NUTRITION PRODUCT INTERMEDIATE LITER

## 2020-11-13 PROCEDURE — 83735 ASSAY OF MAGNESIUM: CPT | Performed by: INTERNAL MEDICINE

## 2020-11-13 PROCEDURE — 250N000012 HC RX MED GY IP 250 OP 636 PS 637: Performed by: INTERNAL MEDICINE

## 2020-11-13 PROCEDURE — 94150 VITAL CAPACITY TEST: CPT

## 2020-11-13 PROCEDURE — 80048 BASIC METABOLIC PNL TOTAL CA: CPT | Performed by: INTERNAL MEDICINE

## 2020-11-13 PROCEDURE — 999N000009 HC STATISTIC AIRWAY CARE

## 2020-11-13 PROCEDURE — 250N000013 HC RX MED GY IP 250 OP 250 PS 637: Performed by: INTERNAL MEDICINE

## 2020-11-13 PROCEDURE — 93010 ELECTROCARDIOGRAM REPORT: CPT | Performed by: INTERNAL MEDICINE

## 2020-11-13 PROCEDURE — 84443 ASSAY THYROID STIM HORMONE: CPT | Performed by: INTERNAL MEDICINE

## 2020-11-13 PROCEDURE — 84100 ASSAY OF PHOSPHORUS: CPT | Performed by: INTERNAL MEDICINE

## 2020-11-13 PROCEDURE — P9041 ALBUMIN (HUMAN),5%, 50ML: HCPCS | Performed by: INTERNAL MEDICINE

## 2020-11-13 PROCEDURE — 250N000009 HC RX 250: Performed by: INTERNAL MEDICINE

## 2020-11-13 RX ORDER — DEXTROSE MONOHYDRATE 25 G/50ML
25-50 INJECTION, SOLUTION INTRAVENOUS
Status: DISCONTINUED | OUTPATIENT
Start: 2020-11-13 | End: 2020-11-13

## 2020-11-13 RX ORDER — ATROPINE SULFATE 0.1 MG/ML
1 INJECTION INTRAVENOUS ONCE
Status: COMPLETED | OUTPATIENT
Start: 2020-11-13 | End: 2020-11-13

## 2020-11-13 RX ORDER — ATROPINE SULFATE 0.1 MG/ML
1 INJECTION INTRAVENOUS ONCE
Status: DISCONTINUED | OUTPATIENT
Start: 2020-11-13 | End: 2020-11-13 | Stop reason: CLARIF

## 2020-11-13 RX ORDER — ATROPINE SULFATE 0.1 MG/ML
INJECTION INTRAVENOUS
Status: COMPLETED
Start: 2020-11-13 | End: 2020-11-13

## 2020-11-13 RX ORDER — NICOTINE POLACRILEX 4 MG
15-30 LOZENGE BUCCAL
Status: DISCONTINUED | OUTPATIENT
Start: 2020-11-13 | End: 2020-11-13

## 2020-11-13 RX ADMIN — PROPOFOL 40 MCG/KG/MIN: 10 INJECTION, EMULSION INTRAVENOUS at 12:20

## 2020-11-13 RX ADMIN — PROPOFOL 40 MCG/KG/MIN: 10 INJECTION, EMULSION INTRAVENOUS at 17:17

## 2020-11-13 RX ADMIN — ALBUMIN HUMAN 2800 ML: 0.05 INJECTION, SOLUTION INTRAVENOUS at 08:59

## 2020-11-13 RX ADMIN — POTASSIUM CHLORIDE, DEXTROSE MONOHYDRATE AND SODIUM CHLORIDE: 150; 5; 450 INJECTION, SOLUTION INTRAVENOUS at 02:19

## 2020-11-13 RX ADMIN — SODIUM CHLORIDE 500 MG: 9 INJECTION, SOLUTION INTRAVENOUS at 17:59

## 2020-11-13 RX ADMIN — ATROPINE SULFATE 1 MG: 0.1 INJECTION PARENTERAL at 09:24

## 2020-11-13 RX ADMIN — AMPICILLIN AND SULBACTAM 3 G: 2; 1 INJECTION, POWDER, FOR SOLUTION INTRAMUSCULAR; INTRAVENOUS at 03:12

## 2020-11-13 RX ADMIN — POTASSIUM CHLORIDE, DEXTROSE MONOHYDRATE AND SODIUM CHLORIDE: 150; 5; 450 INJECTION, SOLUTION INTRAVENOUS at 20:29

## 2020-11-13 RX ADMIN — PROPOFOL 35 MCG/KG/MIN: 10 INJECTION, EMULSION INTRAVENOUS at 03:12

## 2020-11-13 RX ADMIN — INSULIN ASPART 2 UNITS: 100 INJECTION, SOLUTION INTRAVENOUS; SUBCUTANEOUS at 12:25

## 2020-11-13 RX ADMIN — AMPICILLIN AND SULBACTAM 3 G: 2; 1 INJECTION, POWDER, FOR SOLUTION INTRAMUSCULAR; INTRAVENOUS at 10:25

## 2020-11-13 RX ADMIN — INSULIN ASPART 1 UNITS: 100 INJECTION, SOLUTION INTRAVENOUS; SUBCUTANEOUS at 09:33

## 2020-11-13 RX ADMIN — MIDAZOLAM HYDROCHLORIDE 2 MG: 1 INJECTION, SOLUTION INTRAMUSCULAR; INTRAVENOUS at 00:50

## 2020-11-13 RX ADMIN — INSULIN ASPART 1 UNITS: 100 INJECTION, SOLUTION INTRAVENOUS; SUBCUTANEOUS at 20:03

## 2020-11-13 RX ADMIN — MULTIVITAMIN 15 ML: LIQUID ORAL at 08:40

## 2020-11-13 RX ADMIN — CALCIUM GLUCONATE 3 G: 98 INJECTION, SOLUTION INTRAVENOUS at 09:00

## 2020-11-13 RX ADMIN — PANTOPRAZOLE SODIUM 40 MG: 40 INJECTION, POWDER, FOR SOLUTION INTRAVENOUS at 08:23

## 2020-11-13 RX ADMIN — INSULIN ASPART 1 UNITS: 100 INJECTION, SOLUTION INTRAVENOUS; SUBCUTANEOUS at 16:16

## 2020-11-13 RX ADMIN — HEPARIN SODIUM 5000 UNITS: 5000 INJECTION, SOLUTION INTRAVENOUS; SUBCUTANEOUS at 14:20

## 2020-11-13 RX ADMIN — HYDROMORPHONE HYDROCHLORIDE 0.5 MG: 1 INJECTION, SOLUTION INTRAMUSCULAR; INTRAVENOUS; SUBCUTANEOUS at 12:16

## 2020-11-13 RX ADMIN — ATROPINE SULFATE 1 MG: 0.1 INJECTION PARENTERAL at 15:08

## 2020-11-13 RX ADMIN — ATROPINE SULFATE 1 MG: 0.1 INJECTION INTRAVENOUS at 09:24

## 2020-11-13 RX ADMIN — CHLORHEXIDINE GLUCONATE 0.12% ORAL RINSE 15 ML: 1.2 LIQUID ORAL at 08:23

## 2020-11-13 RX ADMIN — HYDROMORPHONE HYDROCHLORIDE 0.5 MG: 1 INJECTION, SOLUTION INTRAMUSCULAR; INTRAVENOUS; SUBCUTANEOUS at 08:20

## 2020-11-13 RX ADMIN — PROPOFOL 20 MCG/KG/MIN: 10 INJECTION, EMULSION INTRAVENOUS at 07:03

## 2020-11-13 RX ADMIN — CHLORHEXIDINE GLUCONATE 0.12% ORAL RINSE 15 ML: 1.2 LIQUID ORAL at 20:09

## 2020-11-13 RX ADMIN — ANTICOAGULANT CITRATE DEXTROSE SOLUTION FORMULA A 700 ML: 12.25; 11; 3.65 SOLUTION INTRAVENOUS at 08:59

## 2020-11-13 RX ADMIN — PROPOFOL 45 MCG/KG/MIN: 10 INJECTION, EMULSION INTRAVENOUS at 22:05

## 2020-11-13 RX ADMIN — AMPICILLIN AND SULBACTAM 3 G: 2; 1 INJECTION, POWDER, FOR SOLUTION INTRAMUSCULAR; INTRAVENOUS at 21:34

## 2020-11-13 RX ADMIN — SODIUM CHLORIDE 500 MG: 9 INJECTION, SOLUTION INTRAVENOUS at 05:40

## 2020-11-13 RX ADMIN — AMPICILLIN AND SULBACTAM 3 G: 2; 1 INJECTION, POWDER, FOR SOLUTION INTRAMUSCULAR; INTRAVENOUS at 16:09

## 2020-11-13 RX ADMIN — HYDROMORPHONE HYDROCHLORIDE 0.5 MG: 1 INJECTION, SOLUTION INTRAMUSCULAR; INTRAVENOUS; SUBCUTANEOUS at 18:15

## 2020-11-13 RX ADMIN — HYDROMORPHONE HYDROCHLORIDE 0.5 MG: 1 INJECTION, SOLUTION INTRAMUSCULAR; INTRAVENOUS; SUBCUTANEOUS at 23:03

## 2020-11-13 RX ADMIN — POTASSIUM CHLORIDE, DEXTROSE MONOHYDRATE AND SODIUM CHLORIDE: 150; 5; 450 INJECTION, SOLUTION INTRAVENOUS at 10:50

## 2020-11-13 RX ADMIN — HEPARIN SODIUM 5000 UNITS: 5000 INJECTION, SOLUTION INTRAVENOUS; SUBCUTANEOUS at 21:34

## 2020-11-13 ASSESSMENT — ACTIVITIES OF DAILY LIVING (ADL)
ADLS_ACUITY_SCORE: 23
ADLS_ACUITY_SCORE: 24
ADLS_ACUITY_SCORE: 23

## 2020-11-13 ASSESSMENT — MIFFLIN-ST. JEOR
SCORE: 1668.37
SCORE: 1668.37

## 2020-11-13 NOTE — PROGRESS NOTES
Treatment in room 346 using Optia apheresis machine. Consent needs to be signed by patient or family. Treatment completed emergently per Dr. Vila.     Height: 5 ft 8in  Weight: 85kg  HCT: 41%  Inlet speed: 60  ACDA ratio: 10:1  Fluid balance: 100  Access: non-tunneled right CVC. Locked with 1.3ml 1:1000 heparin post treatment     Replacement product: 4250 ml 5% Albumin  Electrolyte replacement: Ca Gluconate 5 grams in NS - total 100 mls, piggybacked into return lines, infused over time of treatment.    Premedications: none     Treatment Notes: patient had hypotension and bradycardia at beginning of treatment. ICU nurse gave medications to counteract. Remainder of treatment tolerated well     Treatment completed as ordered, VSS, see EPIC flowsheet for run data.     Next treatment: Friday, 11/13 @ 0800    Roxanna Lopez RN
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
## 2020-11-13 NOTE — PROGRESS NOTES
Apheresis Treatment -     Treatment in room 346 Consent verified.  Treatment number: 2    Height: 173 cm  Weight: 88 kg  HCT: 39%  Inlet speed: 60-70  ACDA ratio: 10:1  Fluid balance: 100%  Access: non tunneled right CVC; locked with 1.3 mL 1:1000 heparin    Replacement product: 3000 mL albumin 5%  Electrolyte replacement: Calcium gluconate 3 grams in NS - total 80 mls, piggybacked into return lines, infused over time of treatment.  Premedications: none    Treatment Notes: Patient had hypotension and bradycardia on and off throughout the treatment.  The primary nurse administered levo and atropine to counteract the symptoms, which helped improve VSS.  MD notified.     Treatment completed as ordered, VSS, see EPIC flowsheet for run data.    Next treatment: Per Dr. Vila the patient is likely to run on Sunday and then every other day for an additional 4 treatments, so 5 more treatments total.  Cadence will update after discussing with neurology.

## 2020-11-13 NOTE — PROGRESS NOTES
Pt. At 1911 heart rate below 40 (32--36). BP 86/50. Atropine 0.5mg. IV given as per order and Heart rate increased to to the 40s. Levo being tirated to keep mean greater than 65.

## 2020-11-13 NOTE — PROVIDER NOTIFICATION
Pt remains profoundly bradycardic.  S/p 1mg dose Atropine during Plasma Pheresis run and a second dose ordered at this time per Dr. carl to be given after a 12 lead ECG is done.  12 lead performed, 1 mg Atropine ordered and given.  Pt's HR up to 65 then within a five minutes HR back into 55 range.  Levophed drip rate at .03 mcg/kg/min.

## 2020-11-13 NOTE — PROGRESS NOTES
[facility] ICU RESPIRATORY NOTE        Date of Admission: 11/10/2020    Date of Intubation (most recent): 11/10/20    Reason for Mechanical Ventilation: Resp.failure    Number of Days on Mechanical Ventilation: *4    Met Criteria for Spontaneous Breathing Trial: No    Reason for No Spontaneous Breathing Trial: Per MD    Significant Events Today:  Patient did NIV 0f -7 and CV of 290.    ABG Results:   Recent Labs   Lab 11/11/20  0635   O2PER 60       Current Vent Settings: Ventilation Mode: CMV/AC  (Continuous Mandatory Ventilation/ Assist Control)  FiO2 (%): 70 %  Rate Set (breaths/minute): 16 breaths/min  Tidal Volume Set (mL): 450 mL  PEEP (cm H2O): 8 cmH2O  Pressure Support (cm H2O): 10 cmH2O  Oxygen Concentration (%): 50 %  Resp: 16        Plan: keep monitoring the patient and keep him on full support for now.  11/10/20    Kasandra Donovan, RT

## 2020-11-13 NOTE — PROGRESS NOTES
"  [facility]    Stroke/Neurocritical Care Progress Note    Interval Events  EMG completed, clearly consistent with neuromuscular junction blockade with decrement seen in myasthenia gravis. Dialysis catheter placed for emergent plasma exchange, first run completed yesterday, then again today. Reports discomfort from tube. Episodic hypotension and bradycardia related to pheresis, requiring pressors.    Impression  1. Myasthenic crisis- EMG confirmed, presented with subacute progressive, fatiguable bulbar and bilateral/symmetric distal>proximal upper/proximal>>distal lower extremity weakness in the setting of weight loss. Myasthenia antibodies pending. CT chest reviewed with Radiology, no evidence of thymic mass, thymoma, or thymic hyperplasia.    Plan  Continue plasma exchange every other day, plan for 5 sessions total (2/5, through 11/19/2020)  Methylprednisolone 500 mg IV q12 x10 doses (2/10)  Daily MIP and tidal volume per RT  Will start pyridostigmine prior to extubation readiness  Anticipate need for steroid taper post and initiation of steroid sparing agent as an outpatient  Can follow up with Dr. Greenwood of [facility] post-hospital for ongoing management  Remainder of cares per Intensivist    The Stroke Staff is Dr. Mcrae.    Vicki Burns MD  Vascular Neurology Fellow  To page me or covering stroke neurology team member, click here: [facility]COM   Choose \"On Call\" tab at top, then search dropdown box for \"Neurology Adult\", select location, press Enter, then look for stroke/neuro ICU/telestroke.    ______________________________________________________    Medications   Home Meds  Prior to Admission medications    Medication Sig Start Date End Date Taking? Authorizing Provider   amoxicillin-clavulanate (AUGMENTIN) 875-125 MG tablet Take 1 tablet by mouth 2 times daily X 10 days (started 11/9/2020)   Yes Unknown, Entered By History   HYDROcodone-acetaminophen (NORCO) 5-325 MG tablet Take 1-2 " tablets by mouth 2 times daily as needed for severe pain    Yes Unknown, Entered By History   predniSONE (DELTASONE) 20 MG tablet Take 20 mg by mouth 2 times daily X 5 days (started 11/9/2020)   Yes Unknown, Entered By History   tamsulosin (FLOMAX) 0.4 MG capsule Take 0.4 mg by mouth daily   Yes Unknown, Entered By History       Scheduled Meds    albumin human  2,800 mL Apheresis During apheresis procedure     ampicillin-sulbactam (UNASYN) IV  3 g Intravenous Q6H     anticoagulant citrate  500-2,000 mL Apheresis Once     calcium gluconate intermittent infusion with additives - doses under 5g  3 g Intravenous Once     chlorhexidine  15 mL Mouth/Throat Q12H     heparin ANTICOAGULANT  5,000 Units Subcutaneous Q8H     insulin aspart  1-4 Units Subcutaneous Q4H     methylPREDNISolone  500 mg Intravenous Q12H     multivitamins w/minerals  15 mL Per Feeding Tube Daily     pantoprazole (PROTONIX) IV  40 mg Intravenous Daily       Infusion Meds    dextrose 5% and 0.45% NaCl + KCl 20 mEq/L 100 mL/hr at 11/13/20 0219     - MEDICATION INSTRUCTIONS -       norepinephrine Stopped (11/12/20 1930)     propofol (DIPRIVAN) infusion 35 mcg/kg/min (11/13/20 0726)     sodium chloride 0.9%       sodium chloride 20 mL/hr at 11/12/20 6978       PRN Meds  sodium chloride 0.9%, acetaminophen **OR** acetaminophen, albuterol, calcium gluconate, calcium gluconate, glucose **OR** dextrose **OR** glucagon, diphenhydrAMINE, heparin Lock (1000 units/mL High concentration), heparin lock flush, HYDROmorphone, lidocaine 4%, lidocaine (buffered or not buffered), - MEDICATION INSTRUCTIONS -, midazolam, naloxone, ondansetron **OR** ondansetron, propofol (DIPRIVAN) infusion **AND** propofol **AND** CK total **AND** Triglycerides, senna-docusate **OR** senna-docusate, sodium chloride (PF), sodium chloride 0.9%       PHYSICAL EXAMINATION  Temp:  [97  F (36.1  C)-98.3  F (36.8  C)] 97.4  F (36.3  C)  Pulse:  [32-78] 78  Resp:  [0-27] 15  BP:  ()/() 138/90  FiO2 (%):  [40 %-70 %] 70 %  SpO2:  [86 %-100 %] 86 %     General:  patient lying in bed without any acute distress    HEENT:  normocephalic/atraumatic, intubated  Cardio:  RRR  Pulmonary:  on mechanical ventilation  Abdomen:  soft, non-tender, non-distended  Extremities:  no edema, peripheral pulses palpable  Skin:  intact, warm/dry      Neurologic  Mental Status:  examined on propofol 40 mcg/kg/min, briskly awakens, attends to voice and command. Follows complex commands, shakes/nods appropriately  Cranial Nerves:  VFF, PERRL, bilateral ptosis, R worse than L, mildly dysconjugate rest gaze, lower facial musculature obscured by ETT, unable to fully purse lips around tube,  tongue protrudes, obscured by ETT  Motor: elbow flexion 4/5, elbow extension 3/5, finger flexion 5/5, finger extension 4/5. Hip flexion 4/5, knee flexion 4+/5, knee extension 4+/5, ankle dorsiflexion 5/5, ankle plantarflexion 5/5. No fasciculations, twitches, or abnormal movements.  Reflexes:  2+ intact and symmetric reflexes in bilateral biceps, brachioradialis, patellae, achilles, toes downgoing  Sensory:  light touch sensation intact and symmetric throughout upper and lower extremities  Coordination:  not tested  Station/Gait:  not tested    Imaging  I personally reviewed all imaging; relevant findings per HPI.     Lab Results Data   CBC  Recent Labs   Lab 11/13/20  0515 11/11/20  0635 11/11/20  0020   WBC 7.0 12.6* 11.6*   RBC 4.36* 4.44 4.44   HGB 13.1* 13.3 13.3   HCT 39.2* 41.2 41.6    199 221     Basic Metabolic Panel    Recent Labs   Lab 11/13/20  0515 11/12/20  0525 11/11/20  0635    145* 144   POTASSIUM 3.8 3.4 3.8   CHLORIDE 112* 113* 113*   CO2 26 28 26   BUN 15 20 23   CR 0.82 1.04 0.95   * 75 77   JESSICA 7.9* 7.9* 7.9*     Liver Panel  Recent Labs   Lab 11/11/20  0020   PROTTOTAL 5.2*   ALBUMIN 2.7*   BILITOTAL 0.3   ALKPHOS 55   AST 29   ALT 44     INR  No lab results found.   Lipid  Profile  No lab results found.  A1C  No lab results found.  Troponin I  No results for input(s): TROPI in the last 168 hours.

## 2020-11-13 NOTE — PROGRESS NOTES
CLINICAL NUTRITION SERVICES - REASSESSMENT NOTE      Recommendations Ordered by Registered Dietitian (RD):   Change TF product to Peptamen Intense VHP at 55 mL/hr = 1320 kcals, 121 gm pro (1.5 gm/kg), 1109 mL H20, 5 gm fiber, 100 gm CHO  Total (TF + Propofol + D5 IVF): 2269 kcals (28 kcal/kg)   Total (TF + Propofol): 1860 kcals (22 kcal/kg and 91% energy needs   Malnutrition:  ()  % Weight Loss:  > 5% in 1 month (severe malnutrition)  % Intake:  Unable to determine  Subcutaneous Fat Loss: Unable to determine  Muscle Loss:  Unable to determine, but suspect  Fluid Retention:  None noted     Malnutrition Diagnosis: Unable to determine due to inability to perform Nutrition Focused Physical Exam       EVALUATION OF PROGRESS TOWARD GOALS   Diet:  NPO on vent    Nutrition Support:  TF was started at 15 mL/hr and increased by 20 mL every 12 hrs to goal (achieved today at 0900) as below:    Nutrition Support Enteral:  Type of Feeding Tube:  NG  Enteral Frequency:  Continuous  Enteral Regimen:  Replete no fiber at 55 mL/hr  Total Enteral Provisions:  1320 kcal, 84 g pro (1.0 g/kg and 86% pro needs), 1096 mL free H2O, 0 g fiber, 148 g CHO.   Free Water Flush:  60 mL every 4 hrs  Certavite daily     Propofol is running at 20.5 mL/hr = 541 kcals (lipid)  IVF D5 1/2 NaCl + 20 KCl is running at 100 mL/hr = 120 gm CHO, 408 kcals  Total (TF + Propofol + D5 IVF):  2269 kcals (28 kcal/kg)    Intake/Tolerance:    Last recorded stool 11/13 x2.  Otherwise, last stool had been .  Refeeding labs have been as follows (-)  K:  3.4 --> 3.8 - acceptable  M.5 (H) --> 2.2 - acceptable  Phos:  2.8 --> 3.9 - acceptable.  BGM:  99, 133, 193 - Low Res sliding scale insulin   Wt:  87.9 kg (up 6.1 kg since admit).  Pt with 1+ trace generalized edema.    ASSESSED NUTRITION NEEDS PER APPROVED PRACTICE GUIDELINES:     Dosing Weight 81.8 kg (admit wt)  Estimated Energy Needs: 2468-4606 kcals (25-30 Kcal/Kg)  Justification:  maintenance  Estimated Protein Needs:  grams protein (1.2-1.5 g pro/Kg)  Justification: Repletion    NEW FINDINGS:   11/12:  AXR = Feeding tube tip coiled in the left upper quadrant in the expected location of the stomach (NG)  11/12:  Non-tunneled CVC placed --> begin plasmapheresis    Norepinephrine - for hypotension.  Plan SBT today per rounds.    Previous Goals (11/11):   Patient will tolerate TF without refeeding syndrome.  Evaluation: Met    Previous Nutrition Diagnosis (11/11):   Inadequate energy/protein intake related to intubation as evidenced by NPO status, Propofol meeting 13% of energy and 0% protein needs, and TF planned  Evaluation: Improving    CURRENT NUTRITION DIAGNOSIS  Inadequate protein intake related to low TF rate of isocaloric product as evidenced by TF meeting 86% protein needs    INTERVENTIONS  Recommendations / Nutrition Prescription  Change TF product to Peptamen Intense VHP at 55 mL/hr = 1320 kcals, 121 gm pro (1.5 gm/kg), 1109 mL H20, 5 gm fiber, 100 gm CHO  Total (TF + Propofol + D5 IVF): 2269 kcals (28 kcal/kg)   Total (TF + Propofol): 1860 kcals (22 kcal/kg and 91% energy needs)    Implementation   Collaboration and Referral of Nutrition care - Pt was discussed during ICU interdisciplinary rounds this morning  EN Composition - Changed TF product in Epic as above    Goals  TF + Propofol (with or without D5 containing IVF) will meet % estimated needs      MONITORING AND EVALUATION:  Progress towards goals will be monitored and evaluated per protocol and Practice Guidelines    Tracy Benavidez, RD, LD, CNSC

## 2020-11-13 NOTE — PROVIDER NOTIFICATION
Pt desaturating with turns for fecal incontinence to the mid 80's with poor recovery. FIO2 increased to 70% with nominal effect. Peep increased to 8. Dr Clement notified et updated. To assess pt at bedside.

## 2020-11-13 NOTE — PROGRESS NOTES
[facility] ICU RESPIRATORY NOTE  Date of Admission: 11/10/20  Date of Intubation (most recent): 11/10/20  Reason for Mechanical Ventilation: Resp. failure  Number of Days on Mechanical Ventilation: 4  Met Criteria for Pressure Support Trial:   Length of Pressure Support Trial:    Reason for Stopping Pressure Support Trial:  Reason for No Pressure Support Trial: per MD     Significant Events Today: None overnight   ABG Results:   Recent Labs   Lab 11/11/20  0635   O2PER 60     Ventilation Mode: CMV/AC  (Continuous Mandatory Ventilation/ Assist Control)  FiO2 (%): 50 %  Rate Set (breaths/minute): 16 breaths/min  Tidal Volume Set (mL): 450 mL  PEEP (cm H2O): 5 cmH2O  Pressure Support (cm H2O): 10 cmH2O  Oxygen Concentration (%): 50 %  Resp: 16    Joao Smallwood, RT

## 2020-11-13 NOTE — PROGRESS NOTES
"         Assessment and Plan:   MG: pharesis per Neuro request. 2nd run today, 1 PV XC, replace with 5% alb and CaGluc. Using R CVC.   He has hemodynamic instability with hypotension and bradycardia. He is now on levophed. Getting D51/2 with 20 K at 100/h.  Plan 3 more runs every other day.             Interval History:   Myasthenia gravis: per neuro. On solumedrol.   Resp failure/mech vent  TBI  ?asp  Pneumonia: on unasyn.                    Review of Systems:   Intubated          Medications:       ampicillin-sulbactam (UNASYN) IV  3 g Intravenous Q6H     chlorhexidine  15 mL Mouth/Throat Q12H     heparin ANTICOAGULANT  5,000 Units Subcutaneous Q8H     insulin aspart  1-4 Units Subcutaneous Q4H     methylPREDNISolone  500 mg Intravenous Q12H     multivitamins w/minerals  15 mL Per Feeding Tube Daily     pantoprazole (PROTONIX) IV  40 mg Intravenous Daily       dextrose 5% and 0.45% NaCl + KCl 20 mEq/L 100 mL/hr at 11/13/20 0821     - MEDICATION INSTRUCTIONS -       norepinephrine 0.02 mcg/kg/min (11/13/20 0905)     propofol (DIPRIVAN) infusion 40 mcg/kg/min (11/13/20 0832)     sodium chloride 0.9%       sodium chloride 20 mL/hr at 11/12/20 1058     Current active medications and PTA medications reviewed, see medication list for details.            Physical Exam:   Vitals were reviewed  Patient Vitals for the past 24 hrs:   BP Temp Temp src Pulse Resp SpO2 Height Weight   11/13/20 0900 (!) 88/54 -- -- 51 16 -- -- --   11/13/20 0845 91/59 97.6  F (36.4  C) Axillary (!) 49 16 95 % -- --   11/13/20 0832 -- -- -- -- 16 -- -- --   11/13/20 0830 101/62 -- -- (!) 49 16 93 % -- --   11/13/20 0825 110/61 -- -- 65 20 -- -- --   11/13/20 0815 118/67 -- -- 70 20 98 % -- --   11/13/20 0810 122/67 97.8  F (36.6  C) Axillary 70 18 -- -- --   11/13/20 0800 111/65 -- -- 66 19 94 % 1.727 m (5' 7.99\") 87.9 kg (193 lb 12.6 oz)   11/13/20 0745 -- -- -- 75 20 94 % -- --   11/13/20 0730 -- -- -- 81 18 93 % -- --   11/13/20 0727 -- -- -- " -- -- (!) 86 % -- --   11/13/20 0715 -- -- -- 83 -- (!) 86 % -- --   11/13/20 0700 (!) 133/102 -- -- 78 15 92 % -- --   11/13/20 0600 119/72 -- -- 70 16 99 % -- --   11/13/20 0530 101/59 -- -- (!) 46 16 96 % -- --   11/13/20 0500 98/63 -- -- (!) 43 16 96 % -- 87.9 kg (193 lb 12.6 oz)   11/13/20 0430 95/62 -- -- (!) 42 15 95 % -- --   11/13/20 0400 97/60 -- -- (!) 42 16 95 % -- --   11/13/20 0330 98/62 -- -- (!) 42 15 95 % -- --   11/13/20 0300 95/55 -- -- (!) 47 16 94 % -- --   11/13/20 0237 -- -- -- -- -- 95 % -- --   11/13/20 0230 98/55 -- -- (!) 42 16 95 % -- --   11/13/20 0200 93/56 -- -- (!) 43 16 92 % -- --   11/13/20 0130 93/55 -- -- 50 15 93 % -- --   11/13/20 0100 113/65 -- -- 64 15 93 % -- --   11/13/20 0000 115/75 97.4  F (36.3  C) Oral 57 21 95 % -- --   11/12/20 2300 106/64 -- -- 65 20 95 % -- --   11/12/20 2242 -- -- -- -- -- 97 % -- --   11/12/20 2230 104/61 -- -- 64 20 98 % -- --   11/12/20 2200 116/71 -- -- (!) 46 17 100 % -- --   11/12/20 2145 109/77 -- -- 52 17 92 % -- --   11/12/20 2130 101/68 -- -- (!) 42 15 98 % -- --   11/12/20 2115 94/64 -- -- (!) 43 16 97 % -- --   11/12/20 2100 104/66 -- -- (!) 44 16 98 % -- --   11/12/20 2059 104/66 -- -- (!) 42 12 -- -- --   11/12/20 2054 104/68 -- -- (!) 42 12 95 % -- --   11/12/20 2046 101/68 -- -- (!) 43 16 96 % -- --   11/12/20 2045 101/68 -- -- (!) 49 15 96 % -- --   11/12/20 2030 (!) 89/55 -- -- (!) 45 16 98 % -- --   11/12/20 2015 100/63 97  F (36.1  C) Axillary (!) 47 16 98 % -- --   11/12/20 2014 100/63 -- -- (!) 44 15 -- -- --   11/12/20 2000 93/59 -- -- (!) 46 15 98 % -- --   11/12/20 1958 94/65 -- -- (!) 46 15 -- -- --   11/12/20 1945 124/83 -- -- (!) 47 16 99 % -- --   11/12/20 1936 (!) 172/103 -- -- (!) 48 16 97 % -- --   11/12/20 1930 (!) 172/103 -- -- (!) 47 15 98 % -- --   11/12/20 1915 (!) 161/107 -- -- (!) 46 15 100 % -- --   11/12/20 1911 (!) 86/50 -- -- (!) 32 16 96 % -- --   11/12/20 1910 91/64 -- -- (!) 38 16 96 % -- --   11/12/20  "1900 (!) 81/57 -- -- (!) 43 16 92 % -- --   11/12/20 1858 (!) 85/58 -- -- (!) 43 16 94 % -- --   11/12/20 1849 -- -- -- -- -- 94 % -- --   11/12/20 1845 98/68 -- -- (!) 42 22 93 % -- --   11/12/20 1840 (!) 79/50 -- -- (!) 43 12 94 % -- --   11/12/20 1836 (!) 81/51 -- -- (!) 43 15 -- -- --   11/12/20 1835 (!) 78/48 -- -- (!) 43 16 96 % -- --   11/12/20 1830 (!) 81/51 -- -- (!) 43 15 96 % -- --   11/12/20 1825 (!) 79/57 -- -- (!) 43 15 96 % -- --   11/12/20 1820 (!) 87/57 -- -- (!) 45 12 96 % 1.727 m (5' 7.99\") 85.4 kg (188 lb 4.4 oz)   11/12/20 1815 -- -- -- (!) 45 16 97 % -- --   11/12/20 1810 -- -- -- (!) 45 16 97 % -- --   11/12/20 1805 -- -- -- (!) 44 15 97 % -- --   11/12/20 1800 (!) 105/93 98  F (36.7  C) Axillary (!) 45 16 100 % -- --   11/12/20 1755 -- -- -- (!) 48 16 99 % -- --   11/12/20 1750 -- -- -- (!) 48 16 99 % -- --   11/12/20 1745 94/73 -- -- (!) 47 15 98 % -- --   11/12/20 1740 -- -- -- (!) 48 17 100 % -- --   11/12/20 1735 -- -- -- (!) 46 16 99 % -- --   11/12/20 1730 (!) 86/58 -- -- (!) 46 16 99 % -- --   11/12/20 1725 -- -- -- (!) 47 (!) 0 98 % -- --   11/12/20 1720 -- -- -- 51 16 94 % -- --   11/12/20 1715 98/66 -- -- (!) 48 16 98 % -- --   11/12/20 1710 -- -- -- (!) 48 12 97 % -- --   11/12/20 1705 -- -- -- (!) 48 16 93 % -- --   11/12/20 1700 (!) 86/58 -- -- (!) 49 16 91 % -- --   11/12/20 1655 -- -- -- (!) 49 15 93 % -- --   11/12/20 1650 -- -- -- (!) 47 (!) 0 98 % -- --   11/12/20 1645 104/69 -- -- (!) 49 16 96 % -- --   11/12/20 1640 -- -- -- (!) 47 16 95 % -- --   11/12/20 1635 -- -- -- 50 15 (!) 89 % -- --   11/12/20 1630 117/73 -- -- 50 16 92 % -- --   11/12/20 1625 -- -- -- 52 16 94 % -- --   11/12/20 1620 -- -- -- 51 15 96 % -- --   11/12/20 1615 120/74 -- -- 51 16 95 % -- --   11/12/20 1610 -- -- -- 51 16 97 % -- --   11/12/20 1605 -- -- -- 55 12 96 % -- --   11/12/20 1600 (!) 146/82 98  F (36.7  C) Axillary 63 25 97 % -- --   11/12/20 1545 -- -- -- 53 25 98 % -- --   11/12/20 1530 " (!) 149/108 -- -- 54 27 97 % -- --   20 1515 -- -- -- 53 16 93 % -- --   20 1500 136/79 -- -- 54 16 96 % -- --   20 1445 -- -- -- 56 19 96 % -- --   20 1430 125/83 -- -- 51 12 94 % -- --   20 1415 -- -- -- 54 23 94 % -- --   20 1400 (!) 125/92 -- -- 50 23 93 % -- --   20 1345 124/84 -- -- 50 25 94 % -- --   20 1330 -- -- -- (!) 48 16 92 % -- --   20 1315 -- -- -- 50 16 91 % -- --   20 1300 124/80 -- -- 55 -- -- -- --   20 1235 117/76 -- -- 56 -- -- -- --   20 1230 128/81 -- -- 55 -- -- -- --   20 1225 124/75 -- -- 52 -- -- -- --   20 1200 119/86 98.3  F (36.8  C) -- (!) 46 -- 95 % -- --   20 1145 93/63 -- -- (!) 47 15 95 % -- --   20 1130 (!) 86/56 -- -- (!) 44 15 95 % -- --   20 1115 93/53 -- -- (!) 43 16 95 % -- --   20 1114 -- -- -- -- -- 96 % -- --   20 1100 (!) 87/49 -- -- (!) 46 16 93 % -- --   20 1000 127/76 -- -- 52 16 93 % -- --       Temp:  [97  F (36.1  C)-98.3  F (36.8  C)] 97.6  F (36.4  C)  Pulse:  [32-83] 51  Resp:  [0-27] 16  BP: ()/() 88/54  FiO2 (%):  [40 %-70 %] 70 %  SpO2:  [86 %-100 %] 95 %    Temperatures:  Current - Temp: 97.6  F (36.4  C); Max - Temp  Av.7  F (36.5  C)  Min: 97  F (36.1  C)  Max: 98.3  F (36.8  C)  Respiration range: Resp  Av.1  Min: 0  Max: 27  Pulse range: Pulse  Av.2  Min: 32  Max: 83  Blood pressure range: Systolic (24hrs), Av , Min:78 , Max:172   ; Diastolic (24hrs), Av, Min:48, Max:108    Pulse oximetry range: SpO2  Av.4 %  Min: 86 %  Max: 100 %    I/O last 3 completed shifts:  In: 4969.57 [I.V.:4404.57; NG/GT:180]  Out:  [Urine:]      Intake/Output Summary (Last 24 hours) at 2020 0935  Last data filed at 2020 0900  Gross per 24 hour   Intake 5308.84 ml   Output 2975 ml   Net 2333.84 ml       Intubated, unresponsive  Lungs with clear ant BS  Cor RRR nl s1 S2 no M  LE no edema, good pedal  pulses  R CVC       Wt Readings from Last 4 Encounters:   11/13/20 87.9 kg (193 lb 12.6 oz)          Data:          Lab Results   Component Value Date     11/13/2020     11/12/2020     11/11/2020    Lab Results   Component Value Date    CHLORIDE 112 11/13/2020    CHLORIDE 113 11/12/2020    CHLORIDE 113 11/11/2020    Lab Results   Component Value Date    BUN 15 11/13/2020    BUN 20 11/12/2020    BUN 23 11/11/2020      Lab Results   Component Value Date    POTASSIUM 3.8 11/13/2020    POTASSIUM 3.4 11/12/2020    POTASSIUM 3.8 11/11/2020    Lab Results   Component Value Date    CO2 26 11/13/2020    CO2 28 11/12/2020    CO2 26 11/11/2020    Lab Results   Component Value Date    CR 0.82 11/13/2020    CR 1.04 11/12/2020    CR 0.95 11/11/2020        Recent Labs   Lab Test 11/13/20  0515 11/11/20  0635 11/11/20  0020   WBC 7.0 12.6* 11.6*   HGB 13.1* 13.3 13.3   HCT 39.2* 41.2 41.6   MCV 90 93 94    199 221     Recent Labs   Lab Test 11/11/20  0020   AST 29   ALT 44   ALKPHOS 55   BILITOTAL 0.3       Recent Labs   Lab Test 11/13/20  0515 11/12/20  0525 11/11/20  0020   MAG 2.2 2.5* 2.2     Recent Labs   Lab Test 11/13/20  0515 11/12/20  0525 11/11/20  0020   PHOS 3.9 2.8 3.3     Recent Labs   Lab Test 11/13/20  0515 11/12/20  0525 11/11/20  0635   JESSICA 7.9* 7.9* 7.9*       Lab Results   Component Value Date    JESSICA 7.9 (L) 11/13/2020     Lab Results   Component Value Date    WBC 7.0 11/13/2020    HGB 13.1 (L) 11/13/2020    HCT 39.2 (L) 11/13/2020    MCV 90 11/13/2020     11/13/2020     Lab Results   Component Value Date     11/13/2020    POTASSIUM 3.8 11/13/2020    CHLORIDE 112 (H) 11/13/2020    CO2 26 11/13/2020     (H) 11/13/2020     Lab Results   Component Value Date    BUN 15 11/13/2020    CR 0.82 11/13/2020     Lab Results   Component Value Date    MAG 2.2 11/13/2020     Lab Results   Component Value Date    PHOS 3.9 11/13/2020       Creatinine   Date Value Ref Range Status    11/13/2020 0.82 0.66 - 1.25 mg/dL Final   11/12/2020 1.04 0.66 - 1.25 mg/dL Final   11/11/2020 0.95 0.66 - 1.25 mg/dL Final   11/11/2020 0.91 0.66 - 1.25 mg/dL Final       Attestation:  I have reviewed today's vital signs, notes, medications, labs and imaging.  Seen during pharesis.      Edward Vila MD

## 2020-11-14 ENCOUNTER — APPOINTMENT (OUTPATIENT)
Dept: GENERAL RADIOLOGY | Facility: CLINIC | Age: 60
DRG: 003 | End: 2020-11-14
Attending: STUDENT IN AN ORGANIZED HEALTH CARE EDUCATION/TRAINING PROGRAM
Payer: COMMERCIAL

## 2020-11-14 ENCOUNTER — APPOINTMENT (OUTPATIENT)
Dept: CT IMAGING | Facility: CLINIC | Age: 60
DRG: 003 | End: 2020-11-14
Attending: INTERNAL MEDICINE
Payer: COMMERCIAL

## 2020-11-14 LAB
ACHR BLOCK AB/ACHR TOTAL SFR SER: 49 % (ref 0–26)
ACHR MOD AB/ACHR TOTAL SFR SER: 97 %
ANION GAP SERPL CALCULATED.3IONS-SCNC: 3 MMOL/L (ref 3–14)
BASE DEFICIT BLDA-SCNC: 0.4 MMOL/L
BASE DEFICIT BLDA-SCNC: 0.7 MMOL/L
BUN SERPL-MCNC: 19 MG/DL (ref 7–30)
CALCIUM SERPL-MCNC: 7.9 MG/DL (ref 8.5–10.1)
CHLORIDE SERPL-SCNC: 113 MMOL/L (ref 94–109)
CO2 SERPL-SCNC: 27 MMOL/L (ref 20–32)
CREAT SERPL-MCNC: 0.94 MG/DL (ref 0.66–1.25)
ERYTHROCYTE [DISTWIDTH] IN BLOOD BY AUTOMATED COUNT: 13.6 % (ref 10–15)
GFR SERPL CREATININE-BSD FRML MDRD: 88 ML/MIN/{1.73_M2}
GLUCOSE BLDC GLUCOMTR-MCNC: 142 MG/DL (ref 70–99)
GLUCOSE BLDC GLUCOMTR-MCNC: 143 MG/DL (ref 70–99)
GLUCOSE BLDC GLUCOMTR-MCNC: 199 MG/DL (ref 70–99)
GLUCOSE BLDC GLUCOMTR-MCNC: 200 MG/DL (ref 70–99)
GLUCOSE BLDC GLUCOMTR-MCNC: 232 MG/DL (ref 70–99)
GLUCOSE BLDC GLUCOMTR-MCNC: 235 MG/DL (ref 70–99)
GLUCOSE SERPL-MCNC: 230 MG/DL (ref 70–99)
HBA1C MFR BLD: 6 % (ref 0–5.6)
HCO3 BLD-SCNC: 26 MMOL/L (ref 21–28)
HCO3 BLD-SCNC: 26 MMOL/L (ref 21–28)
HCT VFR BLD AUTO: 41.2 % (ref 40–53)
HGB BLD-MCNC: 14 G/DL (ref 13.3–17.7)
MCH RBC QN AUTO: 30.6 PG (ref 26.5–33)
MCHC RBC AUTO-ENTMCNC: 34 G/DL (ref 31.5–36.5)
MCV RBC AUTO: 90 FL (ref 78–100)
O2/TOTAL GAS SETTING VFR VENT: 70 %
O2/TOTAL GAS SETTING VFR VENT: ABNORMAL %
OXYHGB MFR BLD: 79 % (ref 92–100)
OXYHGB MFR BLD: 92 % (ref 92–100)
PCO2 BLD: 48 MM HG (ref 35–45)
PCO2 BLD: 49 MM HG (ref 35–45)
PH BLD: 7.34 PH (ref 7.35–7.45)
PH BLD: 7.34 PH (ref 7.35–7.45)
PLATELET # BLD AUTO: 219 10E9/L (ref 150–450)
PO2 BLD: 46 MM HG (ref 80–105)
PO2 BLD: 67 MM HG (ref 80–105)
POTASSIUM SERPL-SCNC: 3.9 MMOL/L (ref 3.4–5.3)
RBC # BLD AUTO: 4.57 10E12/L (ref 4.4–5.9)
SODIUM SERPL-SCNC: 143 MMOL/L (ref 133–144)
STRIA MUS IGG SER QL IF: DETECTED
STRIA MUS IGG TITR SER IF: ABNORMAL {TITER}
WBC # BLD AUTO: 21.4 10E9/L (ref 4–11)

## 2020-11-14 PROCEDURE — 999N000157 HC STATISTIC RCP TIME EA 10 MIN

## 2020-11-14 PROCEDURE — 94150 VITAL CAPACITY TEST: CPT

## 2020-11-14 PROCEDURE — 71275 CT ANGIOGRAPHY CHEST: CPT

## 2020-11-14 PROCEDURE — 80048 BASIC METABOLIC PNL TOTAL CA: CPT | Performed by: INTERNAL MEDICINE

## 2020-11-14 PROCEDURE — 200N000001 HC R&B ICU

## 2020-11-14 PROCEDURE — 999N001017 HC STATISTIC GLUCOSE BY METER IP

## 2020-11-14 PROCEDURE — 258N000003 HC RX IP 258 OP 636: Performed by: INTERNAL MEDICINE

## 2020-11-14 PROCEDURE — 250N000009 HC RX 250: Performed by: INTERNAL MEDICINE

## 2020-11-14 PROCEDURE — 272N000083 HC NUTRITION PRODUCT SEMIELEM INTERMED LITER

## 2020-11-14 PROCEDURE — 250N000011 HC RX IP 250 OP 636: Performed by: STUDENT IN AN ORGANIZED HEALTH CARE EDUCATION/TRAINING PROGRAM

## 2020-11-14 PROCEDURE — 999N000009 HC STATISTIC AIRWAY CARE

## 2020-11-14 PROCEDURE — 82805 BLOOD GASES W/O2 SATURATION: CPT | Performed by: INTERNAL MEDICINE

## 2020-11-14 PROCEDURE — 85027 COMPLETE CBC AUTOMATED: CPT | Performed by: INTERNAL MEDICINE

## 2020-11-14 PROCEDURE — C9113 INJ PANTOPRAZOLE SODIUM, VIA: HCPCS | Performed by: INTERNAL MEDICINE

## 2020-11-14 PROCEDURE — 250N000011 HC RX IP 250 OP 636: Performed by: INTERNAL MEDICINE

## 2020-11-14 PROCEDURE — 99291 CRITICAL CARE FIRST HOUR: CPT | Mod: GC | Performed by: PSYCHIATRY & NEUROLOGY

## 2020-11-14 PROCEDURE — 71045 X-RAY EXAM CHEST 1 VIEW: CPT

## 2020-11-14 PROCEDURE — 250N000013 HC RX MED GY IP 250 OP 250 PS 637: Performed by: STUDENT IN AN ORGANIZED HEALTH CARE EDUCATION/TRAINING PROGRAM

## 2020-11-14 PROCEDURE — 250N000013 HC RX MED GY IP 250 OP 250 PS 637: Performed by: INTERNAL MEDICINE

## 2020-11-14 PROCEDURE — 83036 HEMOGLOBIN GLYCOSYLATED A1C: CPT | Performed by: INTERNAL MEDICINE

## 2020-11-14 PROCEDURE — 99291 CRITICAL CARE FIRST HOUR: CPT | Performed by: INTERNAL MEDICINE

## 2020-11-14 PROCEDURE — 36600 WITHDRAWAL OF ARTERIAL BLOOD: CPT

## 2020-11-14 PROCEDURE — 94003 VENT MGMT INPAT SUBQ DAY: CPT

## 2020-11-14 RX ORDER — PROPOFOL 10 MG/ML
10-20 INJECTION, EMULSION INTRAVENOUS EVERY 30 MIN PRN
Status: DISCONTINUED | OUTPATIENT
Start: 2020-11-14 | End: 2020-11-14

## 2020-11-14 RX ORDER — CARBOXYMETHYLCELLULOSE SODIUM 5 MG/ML
1 SOLUTION/ DROPS OPHTHALMIC
Status: DISCONTINUED | OUTPATIENT
Start: 2020-11-14 | End: 2020-11-15

## 2020-11-14 RX ORDER — PROPOFOL 10 MG/ML
5-75 INJECTION, EMULSION INTRAVENOUS CONTINUOUS
Status: DISCONTINUED | OUTPATIENT
Start: 2020-11-14 | End: 2020-11-14

## 2020-11-14 RX ORDER — IOPAMIDOL 755 MG/ML
72 INJECTION, SOLUTION INTRAVASCULAR ONCE
Status: COMPLETED | OUTPATIENT
Start: 2020-11-14 | End: 2020-11-14

## 2020-11-14 RX ORDER — NICOTINE POLACRILEX 4 MG
15-30 LOZENGE BUCCAL
Status: DISCONTINUED | OUTPATIENT
Start: 2020-11-14 | End: 2020-11-14

## 2020-11-14 RX ORDER — DEXTROSE MONOHYDRATE 25 G/50ML
25-50 INJECTION, SOLUTION INTRAVENOUS
Status: DISCONTINUED | OUTPATIENT
Start: 2020-11-14 | End: 2020-11-14

## 2020-11-14 RX ADMIN — PROPOFOL 45 MCG/KG/MIN: 10 INJECTION, EMULSION INTRAVENOUS at 02:46

## 2020-11-14 RX ADMIN — SODIUM CHLORIDE 500 MG: 9 INJECTION, SOLUTION INTRAVENOUS at 05:58

## 2020-11-14 RX ADMIN — INSULIN ASPART 1 UNITS: 100 INJECTION, SOLUTION INTRAVENOUS; SUBCUTANEOUS at 04:02

## 2020-11-14 RX ADMIN — HEPARIN SODIUM 5000 UNITS: 5000 INJECTION, SOLUTION INTRAVENOUS; SUBCUTANEOUS at 13:33

## 2020-11-14 RX ADMIN — CARBOXYMETHYLCELLULOSE SODIUM 1 DROP: 5 SOLUTION/ DROPS OPHTHALMIC at 16:29

## 2020-11-14 RX ADMIN — POTASSIUM CHLORIDE, DEXTROSE MONOHYDRATE AND SODIUM CHLORIDE: 150; 5; 450 INJECTION, SOLUTION INTRAVENOUS at 16:12

## 2020-11-14 RX ADMIN — HEPARIN SODIUM 5000 UNITS: 5000 INJECTION, SOLUTION INTRAVENOUS; SUBCUTANEOUS at 06:12

## 2020-11-14 RX ADMIN — HYDROMORPHONE HYDROCHLORIDE 0.5 MG: 1 INJECTION, SOLUTION INTRAMUSCULAR; INTRAVENOUS; SUBCUTANEOUS at 10:10

## 2020-11-14 RX ADMIN — AMPICILLIN AND SULBACTAM 3 G: 2; 1 INJECTION, POWDER, FOR SOLUTION INTRAMUSCULAR; INTRAVENOUS at 16:10

## 2020-11-14 RX ADMIN — PANTOPRAZOLE SODIUM 40 MG: 40 INJECTION, POWDER, FOR SOLUTION INTRAVENOUS at 09:14

## 2020-11-14 RX ADMIN — Medication 50 MCG/HR: at 11:35

## 2020-11-14 RX ADMIN — AMPICILLIN AND SULBACTAM 3 G: 2; 1 INJECTION, POWDER, FOR SOLUTION INTRAMUSCULAR; INTRAVENOUS at 22:14

## 2020-11-14 RX ADMIN — AMPICILLIN AND SULBACTAM 3 G: 2; 1 INJECTION, POWDER, FOR SOLUTION INTRAMUSCULAR; INTRAVENOUS at 09:24

## 2020-11-14 RX ADMIN — INSULIN ASPART 1 UNITS: 100 INJECTION, SOLUTION INTRAVENOUS; SUBCUTANEOUS at 09:10

## 2020-11-14 RX ADMIN — SODIUM CHLORIDE 96 ML: 9 INJECTION, SOLUTION INTRAVENOUS at 17:52

## 2020-11-14 RX ADMIN — MIDAZOLAM HYDROCHLORIDE 2 MG: 1 INJECTION, SOLUTION INTRAMUSCULAR; INTRAVENOUS at 17:32

## 2020-11-14 RX ADMIN — HEPARIN SODIUM 5000 UNITS: 5000 INJECTION, SOLUTION INTRAVENOUS; SUBCUTANEOUS at 22:14

## 2020-11-14 RX ADMIN — IOPAMIDOL 72 ML: 755 INJECTION, SOLUTION INTRAVENOUS at 17:51

## 2020-11-14 RX ADMIN — AMPICILLIN AND SULBACTAM 3 G: 2; 1 INJECTION, POWDER, FOR SOLUTION INTRAMUSCULAR; INTRAVENOUS at 03:56

## 2020-11-14 RX ADMIN — MIDAZOLAM HYDROCHLORIDE 2 MG: 1 INJECTION, SOLUTION INTRAMUSCULAR; INTRAVENOUS at 10:35

## 2020-11-14 RX ADMIN — CHLORHEXIDINE GLUCONATE 0.12% ORAL RINSE 15 ML: 1.2 LIQUID ORAL at 09:15

## 2020-11-14 RX ADMIN — MIDAZOLAM HYDROCHLORIDE 2 MG: 1 INJECTION, SOLUTION INTRAMUSCULAR; INTRAVENOUS at 20:37

## 2020-11-14 RX ADMIN — POTASSIUM CHLORIDE, DEXTROSE MONOHYDRATE AND SODIUM CHLORIDE: 150; 5; 450 INJECTION, SOLUTION INTRAVENOUS at 06:34

## 2020-11-14 RX ADMIN — MIDAZOLAM HYDROCHLORIDE 2 MG: 1 INJECTION, SOLUTION INTRAMUSCULAR; INTRAVENOUS at 16:17

## 2020-11-14 RX ADMIN — Medication 50 MCG: at 15:33

## 2020-11-14 RX ADMIN — Medication 50 MCG: at 14:25

## 2020-11-14 RX ADMIN — SODIUM CHLORIDE 500 MG: 9 INJECTION, SOLUTION INTRAVENOUS at 18:15

## 2020-11-14 RX ADMIN — Medication 50 MCG: at 13:21

## 2020-11-14 RX ADMIN — PROPOFOL 45 MCG/KG/MIN: 10 INJECTION, EMULSION INTRAVENOUS at 06:35

## 2020-11-14 RX ADMIN — MULTIVITAMIN 15 ML: LIQUID ORAL at 09:15

## 2020-11-14 RX ADMIN — HYDROMORPHONE HYDROCHLORIDE 0.5 MG: 1 INJECTION, SOLUTION INTRAMUSCULAR; INTRAVENOUS; SUBCUTANEOUS at 05:04

## 2020-11-14 RX ADMIN — INSULIN ASPART 1 UNITS: 100 INJECTION, SOLUTION INTRAVENOUS; SUBCUTANEOUS at 00:00

## 2020-11-14 RX ADMIN — CHLORHEXIDINE GLUCONATE 0.12% ORAL RINSE 15 ML: 1.2 LIQUID ORAL at 19:53

## 2020-11-14 RX ADMIN — INSULIN ASPART 1 UNITS: 100 INJECTION, SOLUTION INTRAVENOUS; SUBCUTANEOUS at 11:44

## 2020-11-14 ASSESSMENT — MIFFLIN-ST. JEOR: SCORE: 1691.37

## 2020-11-14 ASSESSMENT — ACTIVITIES OF DAILY LIVING (ADL)
ADLS_ACUITY_SCORE: 24
ADLS_ACUITY_SCORE: 23
ADLS_ACUITY_SCORE: 24
ADLS_ACUITY_SCORE: 24

## 2020-11-14 NOTE — PLAN OF CARE
Pt alert and follows commands with Propofol between 0-40 mcg.  Pt noted to have generalized weakness and more so on left.  Neuro suggests Nif trials on vent to assist with assessing pt ability to use muscles.  No PST [id number] run of Plasma Pheresis done.  Pt hypotensive and bradycardic requiring restart of Levo and received a total of 2 mg Atropine for rates less than 40.  Atropine transiently effective.  Also [place] with naps/sleep.  PRN Dilaudid for c/o throat pain.  Daughter Piper udpated over phone and later at bedside.  T.F. rate increased and at goal.  Tolerating.  Rectal tube in place but output seems to declined/remains liquidy.

## 2020-11-14 NOTE — PROGRESS NOTES
[facility]    Stroke/Neurocritical Care Progress Note    Interval Events  No acute overnight events. On 45 of propofol upon entry to room with RASS -4. Hypotensive and bradycardic overnight requiring atropine x2 during day and norepi to be turned back on. Propofol turned off, HR increased 25 bpm and BP increased by 20 systolic, norepi turned off. Reports throat discomfort from ETT and appears diaphoretic after ~1 hour off sedation.     Later desaturated and required increased FiO2 and PEEP.    Impression  1. Myasthenic crisis- EMG confirmed, presented with subacute progressive, fatiguable bulbar and bilateral/symmetric distal>proximal upper/proximal>>distal lower extremity weakness in the setting of weight loss. Myasthenia antibodies pending, non-thymomatous per CT chest 11/12/2020.    2. Pain/sedation- Discussed need for perceived high frequency of PRN hydromorphone and midazolam boluses with bedside RN and agreed to adjust orders accordingly to address tube discomfort. Will plan to use fentanyl low dose continuous infusion as first line agent for ETT discomfort/analgesia, and sedation with RASS goal 0 to -1, then PRN fentanyl boluses for breakthrough pain, then midazolam for breakthrough agitation, then low dose propofol as last line agent to permit these goals as it has been significantly contributing to his hemodynamic instability and I anticipate further hypotension related to PLEX tomorrow, and further bradycardia when pyridostigmine is started in the coming days. Additionally, opioid as first line agent for sedation will do well to address subjective air hunger in a patient with respiratory paralysis. Will need to follow gases closely to identify appropriate minute ventilation and provide this as his respiratory drive is poor to absent to begin with. Will continue to follow Mr. Burris's response to this plan as the day progresses and make adjustments as necessary in cooperation  "with the ICU team and his RN.    Plan  Continue plasma exchange every other day, plan for 5 sessions total (2/5, through 11/19/2020)  Methylprednisolone 500 mg IV q12 x10 doses (4/10)  Daily MIP and tidal volume per RT (trend documented in flowsheets, and in physical exam of this note)  Will start pyridostigmine prior to extubation readiness  Anticipate need for steroid taper post and initiation of steroid sparing agent as an outpatient  Can follow up with Dr. Greenwood of [facility] post-hospital for ongoing management  Pain/sedation plan per above  Remainder of cares per Intensivist    The Stroke Staff is Dr. Mcrae.    Vicki Burns MD  Vascular Neurology Fellow  To page me or covering stroke neurology team member, click here: [facility]   Choose \"On Call\" tab at top, then search dropdown box for \"Neurology Adult\", select location, press Enter, then look for stroke/neuro ICU/telestroke.    ______________________________________________________    Medications   Home Meds  Prior to Admission medications    Medication Sig Start Date End Date Taking? Authorizing Provider   amoxicillin-clavulanate (AUGMENTIN) 875-125 MG tablet Take 1 tablet by mouth 2 times daily X 10 days (started 11/9/2020)   Yes Unknown, Entered By History   HYDROcodone-acetaminophen (NORCO) 5-325 MG tablet Take 1-2 tablets by mouth 2 times daily as needed for severe pain    Yes Unknown, Entered By History   predniSONE (DELTASONE) 20 MG tablet Take 20 mg by mouth 2 times daily X 5 days (started 11/9/2020)   Yes Unknown, Entered By History   tamsulosin (FLOMAX) 0.4 MG capsule Take 0.4 mg by mouth daily   Yes Unknown, Entered By History       Scheduled Meds    ampicillin-sulbactam (UNASYN) IV  3 g Intravenous Q6H     chlorhexidine  15 mL Mouth/Throat Q12H     heparin ANTICOAGULANT  5,000 Units Subcutaneous Q8H     insulin aspart  1-4 Units Subcutaneous Q4H     methylPREDNISolone  500 mg Intravenous Q12H     multivitamins w/minerals  15 mL Per Feeding Tube " Daily     pantoprazole (PROTONIX) IV  40 mg Intravenous Daily       Infusion Meds    dextrose 5% and 0.45% NaCl + KCl 20 mEq/L 100 mL/hr at 11/14/20 0634     - MEDICATION INSTRUCTIONS -       norepinephrine 0.04 mcg/kg/min (11/14/20 0650)     propofol (DIPRIVAN) infusion 45 mcg/kg/min (11/14/20 0635)     sodium chloride 0.9%       sodium chloride 20 mL/hr at 11/12/20 6848       PRN Meds  acetaminophen **OR** acetaminophen, albuterol, glucose **OR** dextrose **OR** glucagon, HYDROmorphone, - MEDICATION INSTRUCTIONS -, midazolam, naloxone, ondansetron **OR** ondansetron, polyethylene glycol-propylene glycol PF, propofol (DIPRIVAN) infusion **AND** propofol **AND** CK total **AND** Triglycerides, senna-docusate **OR** senna-docusate, sodium chloride 0.9%       PHYSICAL EXAMINATION  Temp:  [97.6  F (36.4  C)-98.6  F (37  C)] 98  F (36.7  C)  Pulse:  [39-77] 39  Resp:  [0-28] 16  BP: ()/() 85/55  FiO2 (%):  [50 %] 50 %  SpO2:  [92 %-99 %] 96 %     Date 11/13 11/14   MIP/NIF -7 -4   Tidal volume 290 180       General:  patient lying in bed without any acute distress    HEENT:  normocephalic/atraumatic, intubated  Cardio:  RRR  Pulmonary:  on mechanical ventilation  Abdomen:  soft, non-tender, non-distended  Extremities:  no edema, peripheral pulses palpable  Skin:  intact, warm/dry      Neurologic  Mental Status:  examined on propofol 45 mcg/kg/min, does not awaken, flutters eyelids briefly. Turned of and awakens after ~5 minutes then maintains alertness and attentiveness. Follows complex commands, shakes/nods/thumbs up/down appropriately.  Cranial Nerves:  VFF, PERRL, bilateral ptosis, R worse than L, mildly dysconjugate rest gaze, extraocular movements limited in all directions, lower facial musculature obscured by ETT, unable to fully purse lips around tube,  tongue protrudes, obscured by ETT  Motor: elbow flexion 4/5, elbow extension 3/5, finger flexion 5/5, finger extension 4/5. Hip flexion 4/5, knee  flexion 4+/5, knee extension 4+/5, ankle dorsiflexion 5/5, ankle plantarflexion 5/5. No fasciculations, twitches, or abnormal movements.  Reflexes:  2+ intact and symmetric reflexes in bilateral biceps, brachioradialis, patellae, achilles, toes downgoing  Sensory:  light touch sensation intact and symmetric throughout upper and lower extremities  Coordination:  not tested  Station/Gait:  not tested    Imaging  I personally reviewed all imaging; relevant findings per HPI.     Lab Results Data   CBC  Recent Labs   Lab 11/14/20 0402 11/13/20  0515 11/11/20  0635   WBC 21.4* 7.0 12.6*   RBC 4.57 4.36* 4.44   HGB 14.0 13.1* 13.3   HCT 41.2 39.2* 41.2    179 199     Basic Metabolic Panel    Recent Labs   Lab 11/14/20  0402 11/13/20  0515 11/12/20  0525    142 145*   POTASSIUM 3.9 3.8 3.4   CHLORIDE 113* 112* 113*   CO2 27 26 28   BUN 19 15 20   CR 0.94 0.82 1.04   * 202* 75   JESSICA 7.9* 7.9* 7.9*     Liver Panel  Recent Labs   Lab 11/11/20  0020   PROTTOTAL 5.2*   ALBUMIN 2.7*   BILITOTAL 0.3   ALKPHOS 55   AST 29   ALT 44     INR  No lab results found.   Lipid Profile  No lab results found.  A1C  No lab results found.  Troponin I  No results for input(s): TROPI in the last 168 hours.

## 2020-11-14 NOTE — PROGRESS NOTES
Date of Admission: 11/10/2020     Date of Intubation (most recent): 11/10/20     Reason for Mechanical Ventilation: Resp.failure     Number of Days on Mechanical Ventilation: 5     Met Criteria for Spontaneous Breathing Trial: No     Reason for No Spontaneous Breathing Trial: Per MD     Significant Events Tonight; none  Recent Labs   Lab 11/11/20  0635   O2PER 60     Ventilation Mode: CMV/AC  (Continuous Mandatory Ventilation/ Assist Control)  FiO2 (%): 50 %  Rate Set (breaths/minute): 16 breaths/min  Tidal Volume Set (mL): 450 mL  PEEP (cm H2O): 5 cmH2O  Oxygen Concentration (%): 50 %  Resp: 17    Plan; continue ventilatory support, SBT's daily

## 2020-11-14 NOTE — PROGRESS NOTES
Assessment and Plan:   Plasma  Exchange: He had pharesis #2 yesterday. R CVC in place. He had hemodynamic instability during the run with low BP and bradycardia. Planning next run in am. Will run every other day for 3 more runs and then per Neuro.     Labs show K 3.9, Na 143, HCO3 27. Cr nl at 0.94. On D51/2 and 20 K at 100/h.     UO yest 2735.       Interval History:   Myasthenia gravis: per neuro. On solumedrol.   Resp failure/mech vent  TBI  ?asp  Pneumonia: on unasyn    Nutrition: on TF.                Review of Systems:   Intubated.        Medications:       ampicillin-sulbactam (UNASYN) IV  3 g Intravenous Q6H     chlorhexidine  15 mL Mouth/Throat Q12H     heparin ANTICOAGULANT  5,000 Units Subcutaneous Q8H     insulin aspart  1-4 Units Subcutaneous Q4H     methylPREDNISolone  500 mg Intravenous Q12H     multivitamins w/minerals  15 mL Per Feeding Tube Daily     pantoprazole (PROTONIX) IV  40 mg Intravenous Daily       dextrose 5% and 0.45% NaCl + KCl 20 mEq/L 100 mL/hr at 11/14/20 0912     norepinephrine Stopped (11/14/20 0953)     propofol (DIPRIVAN) infusion Stopped (11/14/20 0934)     sodium chloride 20 mL/hr at 11/12/20 2278     Current active medications and PTA medications reviewed, see medication list for details.            Physical Exam:   Vitals were reviewed  Patient Vitals for the past 24 hrs:   BP Temp Temp src Pulse Resp SpO2 Weight   11/14/20 0645 (!) 85/55 -- -- (!) 39 16 96 % --   11/14/20 0630 (!) 89/54 -- -- (!) 42 15 96 % --   11/14/20 0615 92/55 -- -- (!) 42 -- -- --   11/14/20 0600 92/55 -- -- (!) 46 16 96 % --   11/14/20 0545 102/67 -- -- (!) 47 15 95 % --   11/14/20 0530 109/83 -- -- (!) 49 -- 96 % --   11/14/20 0515 105/63 -- -- (!) 49 16 94 % --   11/14/20 0500 116/69 -- -- 55 17 95 % --   11/14/20 0445 109/65 -- -- 54 -- -- --   11/14/20 0430 107/62 -- -- 52 16 94 % --   11/14/20 0415 109/65 -- -- 53 17 94 % --   11/14/20 0400 108/59 98  F (36.7  C) Axillary 56 18 94 % --    11/14/20 0345 110/70 -- -- 61 18 94 % --   11/14/20 0330 108/67 -- -- 56 17 94 % --   11/14/20 0315 112/61 -- -- 54 13 94 % --   11/14/20 0300 110/69 -- -- 55 19 96 % --   11/14/20 0245 100/54 -- -- 56 15 96 % --   11/14/20 0230 95/59 -- -- (!) 47 16 97 % --   11/14/20 0215 (!) 86/55 -- -- (!) 48 -- -- --   11/14/20 0200 92/57 -- -- (!) 42 -- -- 90.2 kg (198 lb 13.7 oz)   11/14/20 0145 91/60 -- -- (!) 40 -- -- --   11/14/20 0130 90/59 -- -- (!) 41 -- 97 % --   11/14/20 0115 90/56 -- -- (!) 42 16 97 % --   11/14/20 0100 91/56 -- -- (!) 42 15 97 % --   11/14/20 0045 (!) 87/58 -- -- (!) 42 15 97 % --   11/14/20 0030 (!) 88/55 -- -- (!) 44 (!) 0 97 % --   11/14/20 0015 (!) 88/57 -- -- (!) 44 12 96 % --   11/14/20 0000 93/59 -- -- (!) 44 15 97 % --   11/13/20 2345 91/55 -- -- (!) 47 16 96 % --   11/13/20 2330 95/53 -- -- (!) 48 16 96 % --   11/13/20 2315 100/58 -- -- 50 13 96 % --   11/13/20 2300 111/62 -- -- 55 24 96 % --   11/13/20 2245 110/64 -- -- 58 15 94 % --   11/13/20 2230 122/67 -- -- 67 -- -- --   11/13/20 2215 114/76 -- -- 56 19 93 % --   11/13/20 2200 121/75 -- -- 73 16 95 % --   11/13/20 2145 125/70 -- -- 71 25 97 % --   11/13/20 2130 115/66 -- -- 72 28 96 % --   11/13/20 2115 106/66 -- -- 70 19 96 % --   11/13/20 2100 100/62 -- -- 56 16 97 % --   11/13/20 2045 101/66 -- -- (!) 47 15 97 % --   11/13/20 2030 104/66 -- -- (!) 46 16 96 % --   11/13/20 2015 96/63 -- -- (!) 43 16 96 % --   11/13/20 2000 92/56 -- -- (!) 43 16 98 % --   11/13/20 1945 92/54 -- -- (!) 44 16 96 % --   11/13/20 1930 90/55 -- -- (!) 47 -- -- --   11/13/20 1915 93/55 -- -- (!) 49 -- -- --   11/13/20 1900 99/61 -- -- 53 16 94 % --   11/13/20 1845 101/63 -- -- 54 16 94 % --   11/13/20 1830 113/80 -- -- 66 16 97 % --   11/13/20 1815 99/61 -- -- 70 16 95 % --   11/13/20 1800 112/60 98.6  F (37  C) Axillary 68 18 94 % --   11/13/20 1745 98/57 -- -- 64 16 92 % --   11/13/20 1730 109/66 -- -- 73 26 95 % --   11/13/20 1715 106/62 -- -- 77 16  92 % --   20 1700 101/59 -- -- -- 16 -- --   20 1645 103/58 -- -- 77 -- 96 % --   20 1630 96/59 -- -- 68 -- 98 % --   20 1615 93/57 -- -- 56 16 98 % --   20 1600 93/57 98.2  F (36.8  C) Axillary 52 16 98 % --   20 1545 97/59 -- -- -- 16 -- --   20 1530 118/82 -- -- 60 20 99 % --   20 1515 122/82 -- -- 56 16 98 % --   20 1500 96/64 -- -- (!) 42 16 99 % --   20 1445 94/69 -- -- (!) 41 16 99 % --   20 1430 (!) 89/56 -- -- (!) 47 16 97 % --   20 1415 96/63 -- -- (!) 44 16 98 % --   20 1400 93/57 -- -- (!) 44 16 97 % --   20 1345 96/63 -- -- (!) 46 16 96 % --   20 1330 99/60 -- -- (!) 45 16 96 % --   20 1315 101/61 -- -- (!) 48 16 95 % --   20 1300 110/66 -- -- (!) 48 -- 96 % --   20 1245 111/67 -- -- 52 -- 95 % --   20 1230 (!) 116/103 -- -- -- 18 -- --   20 1215 107/58 -- -- -- 18 -- --   20 1200 105/55 -- -- 76 18 97 % --   20 1145 107/66 -- -- 59 18 97 % --   20 1130 123/82 -- -- (!) 46 18 99 % --   20 1115 98/73 -- -- (!) 44 16 98 % --   20 1100 106/68 -- -- (!) 45 18 97 % --   20 1045 110/79 -- -- (!) 44 18 99 % --   20 1030 110/71 -- -- -- 16 -- --   20 1020 -- 97.8  F (36.6  C) Axillary -- -- -- --       Temp:  [97.8  F (36.6  C)-98.6  F (37  C)] 98  F (36.7  C)  Pulse:  [39-77] 39  Resp:  [0-28] 16  BP: ()/() 85/55  FiO2 (%):  [50 %] 50 %  SpO2:  [92 %-99 %] 96 %    Temperatures:  Current - Temp: 98  F (36.7  C); Max - Temp  Av.2  F (36.8  C)  Min: 97.8  F (36.6  C)  Max: 98.6  F (37  C)  Respiration range: Resp  Av.6  Min: 0  Max: 28  Pulse range: Pulse  Av.6  Min: 39  Max: 77  Blood pressure range: Systolic (24hrs), Av , Min:85 , Max:125   ; Diastolic (24hrs), Av, Min:53, Max:103    Pulse oximetry range: SpO2  Av.1 %  Min: 92 %  Max: 99 %    I/O last 3 completed shifts:  In: 5002 [I.V.:3362;  NG/GT:360]  Out: 2135 [Urine:2135]      Intake/Output Summary (Last 24 hours) at 11/14/2020 1017  Last data filed at 11/14/2020 0700  Gross per 24 hour   Intake 4196.78 ml   Output 1235 ml   Net 2961.78 ml       Restless, more alert  Intubated  Skin: bright red over face and upper chest. No rash on extremities or abd.  Lungs with coarse rhonchi  Cor RRR nl S1 S2 no M  LE no edema full pulses  R CVC with no drainage or tenderness     Wt Readings from Last 4 Encounters:   11/14/20 90.2 kg (198 lb 13.7 oz)          Data:          Lab Results   Component Value Date     11/14/2020     11/13/2020     11/12/2020    Lab Results   Component Value Date    CHLORIDE 113 11/14/2020    CHLORIDE 112 11/13/2020    CHLORIDE 113 11/12/2020    Lab Results   Component Value Date    BUN 19 11/14/2020    BUN 15 11/13/2020    BUN 20 11/12/2020      Lab Results   Component Value Date    POTASSIUM 3.9 11/14/2020    POTASSIUM 3.8 11/13/2020    POTASSIUM 3.4 11/12/2020    Lab Results   Component Value Date    CO2 27 11/14/2020    CO2 26 11/13/2020    CO2 28 11/12/2020    Lab Results   Component Value Date    CR 0.94 11/14/2020    CR 0.82 11/13/2020    CR 1.04 11/12/2020        Recent Labs   Lab Test 11/14/20  0402 11/13/20  0515 11/11/20  0635   WBC 21.4* 7.0 12.6*   HGB 14.0 13.1* 13.3   HCT 41.2 39.2* 41.2   MCV 90 90 93    179 199     Recent Labs   Lab Test 11/11/20  0020   AST 29   ALT 44   ALKPHOS 55   BILITOTAL 0.3       Recent Labs   Lab Test 11/13/20  0515 11/12/20  0525 11/11/20  0020   MAG 2.2 2.5* 2.2     Recent Labs   Lab Test 11/13/20  0515 11/12/20  0525 11/11/20  0020   PHOS 3.9 2.8 3.3     Recent Labs   Lab Test 11/14/20  0402 11/13/20  0515 11/12/20  0525   JESSICA 7.9* 7.9* 7.9*       Lab Results   Component Value Date    JESSICA 7.9 (L) 11/14/2020     Lab Results   Component Value Date    WBC 21.4 (H) 11/14/2020    HGB 14.0 11/14/2020    HCT 41.2 11/14/2020    MCV 90 11/14/2020     11/14/2020     Lab  Results   Component Value Date     11/14/2020    POTASSIUM 3.9 11/14/2020    CHLORIDE 113 (H) 11/14/2020    CO2 27 11/14/2020     (H) 11/14/2020     Lab Results   Component Value Date    BUN 19 11/14/2020    CR 0.94 11/14/2020     Lab Results   Component Value Date    MAG 2.2 11/13/2020     Lab Results   Component Value Date    PHOS 3.9 11/13/2020       Creatinine   Date Value Ref Range Status   11/14/2020 0.94 0.66 - 1.25 mg/dL Final   11/13/2020 0.82 0.66 - 1.25 mg/dL Final   11/12/2020 1.04 0.66 - 1.25 mg/dL Final   11/11/2020 0.95 0.66 - 1.25 mg/dL Final   11/11/2020 0.91 0.66 - 1.25 mg/dL Final       Attestation:  I have reviewed today's vital signs, notes, medications, labs and imaging.     Edward Vila MD

## 2020-11-14 NOTE — PLAN OF CARE
[facility] Intensive Care Unit   Nursing Note                                                       Neuro:  PERRL.  Moves all extremities.  Follows commands.  Cardiovascular:  RRR.  Markedly bradycardic.  Hemodynamically stable on Levo.  Pulmonary:  Tolerating ventilator on propofol versed fentanyl.  Oxygen saturation > 92  GI/:  Adequate UOP.  Endocrine: Glucose well controlled.  Skin:  Intact except incisional areas.  Protective mepilex applied to sacrum.  Restraints:  Not in use or necessary.  AM labs noted; electrolytes replaced as indicated.  Continue to monitor closely.      ROUTINE IP LABS (Last four results)  BMP  Recent Labs   Lab 11/14/20  0402 11/13/20  0515 11/12/20  0525 11/11/20  0635    142 145* 144   POTASSIUM 3.9 3.8 3.4 3.8   CHLORIDE 113* 112* 113* 113*   JESSICA 7.9* 7.9* 7.9* 7.9*   CO2 27 26 28 26   BUN 19 15 20 23   CR 0.94 0.82 1.04 0.95   * 202* 75 77     CBC  Recent Labs   Lab 11/14/20  0402 11/13/20  0515 11/11/20  0635 11/11/20  0020   WBC 21.4* 7.0 12.6* 11.6*   RBC 4.57 4.36* 4.44 4.44   HGB 14.0 13.1* 13.3 13.3   HCT 41.2 39.2* 41.2 41.6   MCV 90 90 93 94   MCH 30.6 30.0 30.0 30.0   MCHC 34.0 33.4 32.3 32.0   RDW 13.6 13.4 14.0 13.9    179 199 221       Blood Glucose  Glucose (mg/dL)   Date Value   11/14/2020 200 (H)   11/13/2020 199 (H)   11/13/2020 214 (H)   11/13/2020 224 (H)   11/13/2020 267 (H)   11/13/2020 203 (H)       Intake/Output Summary (Last 24 hours) at 11/14/2020 0644  Last data filed at 11/14/2020 0600  Gross per 24 hour   Intake 5002 ml   Output 2135 ml   Net 2867 ml       Brandin Orlando MSN RN PHN CCRN  [facility]  Intensive Care Unit
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
## 2020-11-15 ENCOUNTER — APPOINTMENT (OUTPATIENT)
Dept: GENERAL RADIOLOGY | Facility: CLINIC | Age: 60
DRG: 003 | End: 2020-11-15
Attending: INTERNAL MEDICINE
Payer: COMMERCIAL

## 2020-11-15 ENCOUNTER — APPOINTMENT (OUTPATIENT)
Dept: CARDIOLOGY | Facility: CLINIC | Age: 60
DRG: 003 | End: 2020-11-15
Attending: INTERNAL MEDICINE
Payer: COMMERCIAL

## 2020-11-15 LAB
ACHR BIND AB SER-SCNC: 76.4 NMOL/L (ref 0–0.4)
ALBUMIN SERPL-MCNC: 3.3 G/DL (ref 3.4–5)
ALP SERPL-CCNC: 37 U/L (ref 40–150)
ALT SERPL W P-5'-P-CCNC: 34 U/L (ref 0–70)
ANION GAP SERPL CALCULATED.3IONS-SCNC: 2 MMOL/L (ref 3–14)
AST SERPL W P-5'-P-CCNC: 16 U/L (ref 0–45)
BASOPHILS # BLD AUTO: 0 10E9/L (ref 0–0.2)
BASOPHILS NFR BLD AUTO: 0.1 %
BILIRUB SERPL-MCNC: 0.2 MG/DL (ref 0.2–1.3)
BUN SERPL-MCNC: 23 MG/DL (ref 7–30)
CALCIUM SERPL-MCNC: 7.9 MG/DL (ref 8.5–10.1)
CHLORIDE SERPL-SCNC: 115 MMOL/L (ref 94–109)
CO2 SERPL-SCNC: 30 MMOL/L (ref 20–32)
CREAT SERPL-MCNC: 0.67 MG/DL (ref 0.66–1.25)
DIFFERENTIAL METHOD BLD: ABNORMAL
EOSINOPHIL # BLD AUTO: 0 10E9/L (ref 0–0.7)
EOSINOPHIL NFR BLD AUTO: 0 %
ERYTHROCYTE [DISTWIDTH] IN BLOOD BY AUTOMATED COUNT: 14.2 % (ref 10–15)
GFR SERPL CREATININE-BSD FRML MDRD: >90 ML/MIN/{1.73_M2}
GLUCOSE BLDC GLUCOMTR-MCNC: 141 MG/DL (ref 70–99)
GLUCOSE BLDC GLUCOMTR-MCNC: 146 MG/DL (ref 70–99)
GLUCOSE BLDC GLUCOMTR-MCNC: 154 MG/DL (ref 70–99)
GLUCOSE BLDC GLUCOMTR-MCNC: 159 MG/DL (ref 70–99)
GLUCOSE BLDC GLUCOMTR-MCNC: 173 MG/DL (ref 70–99)
GLUCOSE BLDC GLUCOMTR-MCNC: 173 MG/DL (ref 70–99)
GLUCOSE SERPL-MCNC: 156 MG/DL (ref 70–99)
HCT VFR BLD AUTO: 42 % (ref 40–53)
HGB BLD-MCNC: 13.8 G/DL (ref 13.3–17.7)
IMM GRANULOCYTES # BLD: 0.2 10E9/L (ref 0–0.4)
IMM GRANULOCYTES NFR BLD: 0.9 %
LYMPHOCYTES # BLD AUTO: 0.6 10E9/L (ref 0.8–5.3)
LYMPHOCYTES NFR BLD AUTO: 3 %
MCH RBC QN AUTO: 30.1 PG (ref 26.5–33)
MCHC RBC AUTO-ENTMCNC: 32.9 G/DL (ref 31.5–36.5)
MCV RBC AUTO: 92 FL (ref 78–100)
MONOCYTES # BLD AUTO: 0.7 10E9/L (ref 0–1.3)
MONOCYTES NFR BLD AUTO: 3.9 %
NEUTROPHILS # BLD AUTO: 16.9 10E9/L (ref 1.6–8.3)
NEUTROPHILS NFR BLD AUTO: 92.1 %
NRBC # BLD AUTO: 0 10*3/UL
NRBC BLD AUTO-RTO: 0 /100
PLATELET # BLD AUTO: 178 10E9/L (ref 150–450)
POTASSIUM SERPL-SCNC: 4.1 MMOL/L (ref 3.4–5.3)
PROT SERPL-MCNC: 4.9 G/DL (ref 6.8–8.8)
RBC # BLD AUTO: 4.58 10E12/L (ref 4.4–5.9)
SODIUM SERPL-SCNC: 147 MMOL/L (ref 133–144)
WBC # BLD AUTO: 18.4 10E9/L (ref 4–11)

## 2020-11-15 PROCEDURE — 999N001017 HC STATISTIC GLUCOSE BY METER IP

## 2020-11-15 PROCEDURE — 999N000208 ECHOCARDIOGRAM COMPLETE

## 2020-11-15 PROCEDURE — 250N000011 HC RX IP 250 OP 636: Performed by: INTERNAL MEDICINE

## 2020-11-15 PROCEDURE — 200N000001 HC R&B ICU

## 2020-11-15 PROCEDURE — 80053 COMPREHEN METABOLIC PANEL: CPT | Performed by: INTERNAL MEDICINE

## 2020-11-15 PROCEDURE — 85025 COMPLETE CBC W/AUTO DIFF WBC: CPT | Performed by: INTERNAL MEDICINE

## 2020-11-15 PROCEDURE — 258N000003 HC RX IP 258 OP 636: Performed by: INTERNAL MEDICINE

## 2020-11-15 PROCEDURE — 999N000009 HC STATISTIC AIRWAY CARE

## 2020-11-15 PROCEDURE — 250N000009 HC RX 250: Performed by: INTERNAL MEDICINE

## 2020-11-15 PROCEDURE — 999N000078 HC STATISTIC INTRAPULMONARY PERCUSSIVE VENT

## 2020-11-15 PROCEDURE — 999N000157 HC STATISTIC RCP TIME EA 10 MIN

## 2020-11-15 PROCEDURE — 99291 CRITICAL CARE FIRST HOUR: CPT | Mod: GC | Performed by: PSYCHIATRY & NEUROLOGY

## 2020-11-15 PROCEDURE — 250N000013 HC RX MED GY IP 250 OP 250 PS 637: Performed by: INTERNAL MEDICINE

## 2020-11-15 PROCEDURE — P9041 ALBUMIN (HUMAN),5%, 50ML: HCPCS | Performed by: INTERNAL MEDICINE

## 2020-11-15 PROCEDURE — 94003 VENT MGMT INPAT SUBQ DAY: CPT

## 2020-11-15 PROCEDURE — 272N000270 HC CIRCUIT, METANEB

## 2020-11-15 PROCEDURE — 36514 APHERESIS PLASMA: CPT

## 2020-11-15 PROCEDURE — 99291 CRITICAL CARE FIRST HOUR: CPT | Performed by: INTERNAL MEDICINE

## 2020-11-15 PROCEDURE — 272N000083 HC NUTRITION PRODUCT SEMIELEM INTERMED LITER

## 2020-11-15 PROCEDURE — 250N000011 HC RX IP 250 OP 636: Performed by: STUDENT IN AN ORGANIZED HEALTH CARE EDUCATION/TRAINING PROGRAM

## 2020-11-15 PROCEDURE — 93306 TTE W/DOPPLER COMPLETE: CPT | Mod: 26 | Performed by: INTERNAL MEDICINE

## 2020-11-15 PROCEDURE — 71045 X-RAY EXAM CHEST 1 VIEW: CPT

## 2020-11-15 PROCEDURE — 94150 VITAL CAPACITY TEST: CPT

## 2020-11-15 PROCEDURE — C9113 INJ PANTOPRAZOLE SODIUM, VIA: HCPCS | Performed by: INTERNAL MEDICINE

## 2020-11-15 PROCEDURE — 250N000013 HC RX MED GY IP 250 OP 250 PS 637: Performed by: STUDENT IN AN ORGANIZED HEALTH CARE EDUCATION/TRAINING PROGRAM

## 2020-11-15 RX ORDER — FUROSEMIDE 10 MG/ML
20 INJECTION INTRAMUSCULAR; INTRAVENOUS ONCE
Status: COMPLETED | OUTPATIENT
Start: 2020-11-15 | End: 2020-11-15

## 2020-11-15 RX ORDER — HYDROMORPHONE HYDROCHLORIDE 1 MG/ML
.3-.5 INJECTION, SOLUTION INTRAMUSCULAR; INTRAVENOUS; SUBCUTANEOUS
Status: DISCONTINUED | OUTPATIENT
Start: 2020-11-15 | End: 2020-11-15

## 2020-11-15 RX ORDER — CALCIUM GLUCONATE 94 MG/ML
1 INJECTION, SOLUTION INTRAVENOUS
Status: DISCONTINUED | OUTPATIENT
Start: 2020-11-15 | End: 2020-11-19

## 2020-11-15 RX ORDER — MIDAZOLAM (PF) 1 MG/ML IN 0.9 % SODIUM CHLORIDE INTRAVENOUS SOLUTION
1-2 CONTINUOUS
Status: DISCONTINUED | OUTPATIENT
Start: 2020-11-15 | End: 2020-11-20

## 2020-11-15 RX ORDER — ALBUMIN HUMAN 25 %
3000 INTRAVENOUS SOLUTION INTRAVENOUS
Status: COMPLETED | OUTPATIENT
Start: 2020-11-15 | End: 2020-11-15

## 2020-11-15 RX ORDER — CARBOXYMETHYLCELLULOSE SODIUM 5 MG/ML
1 SOLUTION/ DROPS OPHTHALMIC
Status: DISCONTINUED | OUTPATIENT
Start: 2020-11-15 | End: 2020-11-23

## 2020-11-15 RX ORDER — SULFAMETHOXAZOLE AND TRIMETHOPRIM 200; 40 MG/5ML; MG/5ML
10 SUSPENSION ORAL DAILY
Status: DISCONTINUED | OUTPATIENT
Start: 2020-11-15 | End: 2020-11-27

## 2020-11-15 RX ORDER — AMLODIPINE BESYLATE 10 MG/1
10 TABLET ORAL DAILY
Status: DISCONTINUED | OUTPATIENT
Start: 2020-11-15 | End: 2020-12-05 | Stop reason: HOSPADM

## 2020-11-15 RX ORDER — SULFAMETHOXAZOLE/TRIMETHOPRIM 400MG-80MG
1 TABLET ORAL 2 TIMES DAILY
Status: DISCONTINUED | OUTPATIENT
Start: 2020-11-15 | End: 2020-11-15

## 2020-11-15 RX ORDER — HEPARIN SODIUM 1000 [USP'U]/ML
2 INJECTION, SOLUTION INTRAVENOUS; SUBCUTANEOUS
Status: DISCONTINUED | OUTPATIENT
Start: 2020-11-15 | End: 2020-11-19

## 2020-11-15 RX ORDER — DIPHENHYDRAMINE HYDROCHLORIDE 50 MG/ML
50 INJECTION INTRAMUSCULAR; INTRAVENOUS
Status: DISCONTINUED | OUTPATIENT
Start: 2020-11-15 | End: 2020-11-19

## 2020-11-15 RX ORDER — QUETIAPINE FUMARATE 25 MG/1
25 TABLET, FILM COATED ORAL 2 TIMES DAILY
Status: DISCONTINUED | OUTPATIENT
Start: 2020-11-15 | End: 2020-11-23

## 2020-11-15 RX ADMIN — MIDAZOLAM HYDROCHLORIDE 2 MG: 1 INJECTION, SOLUTION INTRAMUSCULAR; INTRAVENOUS at 02:38

## 2020-11-15 RX ADMIN — AMPICILLIN AND SULBACTAM 3 G: 2; 1 INJECTION, POWDER, FOR SOLUTION INTRAMUSCULAR; INTRAVENOUS at 09:37

## 2020-11-15 RX ADMIN — MIDAZOLAM HYDROCHLORIDE 2 MG: 1 INJECTION, SOLUTION INTRAMUSCULAR; INTRAVENOUS at 11:47

## 2020-11-15 RX ADMIN — Medication 100 MCG/HR: at 05:16

## 2020-11-15 RX ADMIN — MIDAZOLAM HYDROCHLORIDE 2 MG: 1 INJECTION, SOLUTION INTRAMUSCULAR; INTRAVENOUS at 05:43

## 2020-11-15 RX ADMIN — QUETIAPINE 25 MG: 25 TABLET ORAL at 20:07

## 2020-11-15 RX ADMIN — POTASSIUM CHLORIDE, DEXTROSE MONOHYDRATE AND SODIUM CHLORIDE: 150; 5; 450 INJECTION, SOLUTION INTRAVENOUS at 07:24

## 2020-11-15 RX ADMIN — SULFAMETHOXAZOLE AND TRIMETHOPRIM 80 MG: 200; 40 SUSPENSION ORAL at 11:38

## 2020-11-15 RX ADMIN — PANTOPRAZOLE SODIUM 40 MG: 40 INJECTION, POWDER, FOR SOLUTION INTRAVENOUS at 09:22

## 2020-11-15 RX ADMIN — HEPARIN SODIUM 5000 UNITS: 5000 INJECTION, SOLUTION INTRAVENOUS; SUBCUTANEOUS at 21:46

## 2020-11-15 RX ADMIN — ANTICOAGULANT CITRATE DEXTROSE SOLUTION FORMULA A 750 ML: 12.25; 11; 3.65 SOLUTION INTRAVENOUS at 10:12

## 2020-11-15 RX ADMIN — ALBUMIN HUMAN 3000 ML: 0.05 INJECTION, SOLUTION INTRAVENOUS at 10:12

## 2020-11-15 RX ADMIN — SODIUM CHLORIDE 500 MG: 9 INJECTION, SOLUTION INTRAVENOUS at 05:25

## 2020-11-15 RX ADMIN — MIDAZOLAM (PF) 1 MG/ML IN 0.9 % SODIUM CHLORIDE INTRAVENOUS SOLUTION 1 MG/HR: at 14:53

## 2020-11-15 RX ADMIN — Medication 50 MCG: at 00:21

## 2020-11-15 RX ADMIN — MIDAZOLAM HYDROCHLORIDE 1 MG: 1 INJECTION, SOLUTION INTRAMUSCULAR; INTRAVENOUS at 07:42

## 2020-11-15 RX ADMIN — CHLORHEXIDINE GLUCONATE 0.12% ORAL RINSE 15 ML: 1.2 LIQUID ORAL at 20:06

## 2020-11-15 RX ADMIN — MIDAZOLAM HYDROCHLORIDE 2 MG: 1 INJECTION, SOLUTION INTRAMUSCULAR; INTRAVENOUS at 03:26

## 2020-11-15 RX ADMIN — Medication 0.3 MG/HR: at 10:38

## 2020-11-15 RX ADMIN — ALBUTEROL SULFATE 2.5 MG: 2.5 SOLUTION RESPIRATORY (INHALATION) at 05:16

## 2020-11-15 RX ADMIN — SODIUM CHLORIDE 500 MG: 9 INJECTION, SOLUTION INTRAVENOUS at 18:15

## 2020-11-15 RX ADMIN — AMPICILLIN AND SULBACTAM 3 G: 2; 1 INJECTION, POWDER, FOR SOLUTION INTRAMUSCULAR; INTRAVENOUS at 16:15

## 2020-11-15 RX ADMIN — HEPARIN SODIUM 5000 UNITS: 5000 INJECTION, SOLUTION INTRAVENOUS; SUBCUTANEOUS at 05:24

## 2020-11-15 RX ADMIN — CARBOXYMETHYLCELLULOSE SODIUM 1 DROP: 5 SOLUTION/ DROPS OPHTHALMIC at 18:26

## 2020-11-15 RX ADMIN — FUROSEMIDE 20 MG: 10 INJECTION, SOLUTION INTRAVENOUS at 09:17

## 2020-11-15 RX ADMIN — CARBOXYMETHYLCELLULOSE SODIUM 1 DROP: 5 SOLUTION/ DROPS OPHTHALMIC at 16:15

## 2020-11-15 RX ADMIN — AMLODIPINE BESYLATE 10 MG: 10 TABLET ORAL at 11:38

## 2020-11-15 RX ADMIN — MIDAZOLAM HYDROCHLORIDE 2 MG: 1 INJECTION, SOLUTION INTRAMUSCULAR; INTRAVENOUS at 00:01

## 2020-11-15 RX ADMIN — AMPICILLIN AND SULBACTAM 3 G: 2; 1 INJECTION, POWDER, FOR SOLUTION INTRAMUSCULAR; INTRAVENOUS at 21:45

## 2020-11-15 RX ADMIN — MULTIVITAMIN 15 ML: LIQUID ORAL at 09:24

## 2020-11-15 RX ADMIN — CALCIUM GLUCONATE 3 G: 98 INJECTION, SOLUTION INTRAVENOUS at 10:13

## 2020-11-15 RX ADMIN — CHLORHEXIDINE GLUCONATE 0.12% ORAL RINSE 15 ML: 1.2 LIQUID ORAL at 07:57

## 2020-11-15 RX ADMIN — AMPICILLIN AND SULBACTAM 3 G: 2; 1 INJECTION, POWDER, FOR SOLUTION INTRAMUSCULAR; INTRAVENOUS at 04:01

## 2020-11-15 RX ADMIN — QUETIAPINE 25 MG: 25 TABLET ORAL at 10:28

## 2020-11-15 RX ADMIN — HEPARIN SODIUM 5000 UNITS: 5000 INJECTION, SOLUTION INTRAVENOUS; SUBCUTANEOUS at 14:36

## 2020-11-15 RX ADMIN — Medication 50 MCG: at 07:25

## 2020-11-15 RX ADMIN — CARBOXYMETHYLCELLULOSE SODIUM 1 DROP: 5 SOLUTION/ DROPS OPHTHALMIC at 14:40

## 2020-11-15 ASSESSMENT — ACTIVITIES OF DAILY LIVING (ADL)
ADLS_ACUITY_SCORE: 22
ADLS_ACUITY_SCORE: 23
ADLS_ACUITY_SCORE: 22
ADLS_ACUITY_SCORE: 23
ADLS_ACUITY_SCORE: 22
ADLS_ACUITY_SCORE: 23

## 2020-11-15 ASSESSMENT — MIFFLIN-ST. JEOR
SCORE: 1711.25
SCORE: 1709.37

## 2020-11-15 NOTE — PROGRESS NOTES
[facility] ICU RESPIRATORY NOTE        Date of Admission: 11/10/2020    Date of Intubation (most recent):  11/10/20    Reason for Mechanical Ventilation: Resp failure    Number of Days on Mechanical Ventilation: 6    Reason for No Spontaneous Breathing Trial:  Per MD    Significant Events Today:  Metanebs started QID    ABG Results:   Recent Labs   Lab 11/14/20  1400 11/14/20  1235 11/11/20  0635   PH 7.34* 7.34*  --    PCO2 49* 48*  --    PO2 67* 46*  --    HCO3 26 26  --    O2PER 100% 70 60       Current Vent Settings: Ventilation Mode: CMV/AC  (Continuous Mandatory Ventilation/ Assist Control)  Rate Set (breaths/minute): 16 breaths/min  Tidal Volume Set (mL): 450 mL  PEEP (cm H2O): 14 cmH2O  Oxygen Concentration (%): 70 %  Resp: 16      Plan:  Pt to remain on full vent support overnight    Michele Adame, RT
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
## 2020-11-15 NOTE — PROGRESS NOTES
"[facility]    Stroke/Neurocritical Care Progress Note    Interval Events  Much more awake after sedation/analgesia meds adjusted. Still having throat and chest pain related to tube and dyspnea.    4/4 Myasthenia antibodies positive.    Impression  1. 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    2. Respiratory paralysis- secondary to #1, following daily inspiratory forces/tidal volumes in response to therapy    3. Pain/sedation- to tolerate intubation    4. Lung consolidation- consider aspiration secondary to bulbar weakness/dysphagia and low lung volumes, management per Intensivist    Plan  Continue plasma exchange every other day, plan for 5 sessions total (3/5, through 11/19/2020)  Methylprednisolone 500 mg IV q12 x10 doses (6/10)  Daily MIP and tidal volume per RT (trend documented in flowsheets, and in physical exam of this note)  Will start pyridostigmine prior to extubation readiness  Anticipate need for steroid taper post and initiation of steroid sparing agent as an outpatient  Can follow up with Dr. Greenwood of [facility] post-hospital for ongoing management  Remainder of cares per Intensivist    The Stroke Staff is Dr. Mcrae.    Vicki Burns MD  Vascular Neurology Fellow  To page me or covering stroke neurology team member, click here: AMCOM   Choose \"On Call\" tab at top, then search dropdown box for \"Neurology Adult\", select location, press Enter, then look for stroke/neuro ICU/telestroke.    ______________________________________________________    Medications   Home Meds  Prior to Admission medications    Medication Sig Start Date End Date Taking? Authorizing Provider   amoxicillin-clavulanate (AUGMENTIN) 875-125 MG tablet Take 1 tablet " by mouth 2 times daily X 10 days (started 11/9/2020)   Yes Unknown, Entered By History   HYDROcodone-acetaminophen (NORCO) 5-325 MG tablet Take 1-2 tablets by mouth 2 times daily as needed for severe pain    Yes Unknown, Entered By History   predniSONE (DELTASONE) 20 MG tablet Take 20 mg by mouth 2 times daily X 5 days (started 11/9/2020)   Yes Unknown, Entered By History   tamsulosin (FLOMAX) 0.4 MG capsule Take 0.4 mg by mouth daily   Yes Unknown, Entered By History       Scheduled Meds    albumin human  3,000 mL Apheresis During apheresis procedure     ampicillin-sulbactam (UNASYN) IV  3 g Intravenous Q6H     anticoagulant citrate  500-2,000 mL Apheresis Once     calcium gluconate intermittent infusion with additives - doses under 5g  3 g Intravenous Once     chlorhexidine  15 mL Mouth/Throat Q12H     furosemide  20 mg Intravenous Once     heparin ANTICOAGULANT  5,000 Units Subcutaneous Q8H     insulin aspart  1-6 Units Subcutaneous Q4H     methylPREDNISolone  500 mg Intravenous Q12H     multivitamins w/minerals  15 mL Per Feeding Tube Daily     pantoprazole (PROTONIX) IV  40 mg Intravenous Daily       Infusion Meds    dextrose 5% and 0.45% NaCl + KCl 20 mEq/L 50 mL/hr at 11/15/20 0724     fentaNYL 100 mcg/hr (11/15/20 0516)     norepinephrine Stopped (11/14/20 0953)     sodium chloride 20 mL/hr at 11/12/20 2358       PRN Meds  sodium chloride 0.9%, acetaminophen **OR** acetaminophen, albuterol, calcium gluconate, calcium gluconate, artificial tears ophthalmic solution, glucose **OR** dextrose **OR** glucagon, diphenhydrAMINE, fentaNYL, heparin Lock (1000 units/mL High concentration), midazolam, naloxone, ondansetron **OR** ondansetron, senna-docusate **OR** senna-docusate       PHYSICAL EXAMINATION  Temp:  [98.4  F (36.9  C)] 98.4  F (36.9  C)  Pulse:  [46-89] 73  Resp:  [0-38] 16  BP: (112-179)/() 135/83  SpO2:  [86 %-98 %] 95 %     Date 11/13 11/14 11/15   MIP/NIF -7 -4 -4   Tidal volume 290 180 240        General:  patient lying in bed without any acute distress    HEENT:  normocephalic/atraumatic, intubated  Cardio:  RRR  Pulmonary:  on mechanical ventilation  Abdomen:  soft, non-tender, non-distended  Extremities:  no edema, peripheral pulses palpable  Skin:  intact, warm/dry      Neurologic  Mental Status:  examined on fentanyl 100 mcg/hr. Awake, alert and attentive. Follows complex commands, shakes/nods/thumbs up/down appropriately.  Cranial Nerves:  VFF, PERRL, ptosis largely resolved, conjugate rest gaze, extraocular movements improved to L, up, and down, still largely restricted to R, lower facial musculature obscured by ETT, but now able to fully purse lips around tube,  tongue protrudes, obscured by ETT  Motor: movements are more brisk today, elbow flexion 4/5, elbow extension 4-/5, finger flexion 5/5, finger extension 4/5. Hip flexion 4/5, knee flexion 4+/5, knee extension 4+/5, ankle dorsiflexion 5/5, ankle plantarflexion 5/5. No fasciculations, twitches, or abnormal movements.  Reflexes:  2+ intact and symmetric reflexes in bilateral biceps, brachioradialis, patellae, achilles, toes downgoing  Sensory:  light touch sensation intact and symmetric throughout upper and lower extremities  Coordination:  not tested  Station/Gait:  not tested    Imaging  I personally reviewed all imaging; relevant findings per HPI.     Lab Results Data   CBC  Recent Labs   Lab 11/15/20  0358 11/14/20  0402 11/13/20  0515   WBC 18.4* 21.4* 7.0   RBC 4.58 4.57 4.36*   HGB 13.8 14.0 13.1*   HCT 42.0 41.2 39.2*    219 179     Basic Metabolic Panel    Recent Labs   Lab 11/15/20  0358 11/14/20  0402 11/13/20  0515   * 143 142   POTASSIUM 4.1 3.9 3.8   CHLORIDE 115* 113* 112*   CO2 30 27 26   BUN 23 19 15   CR 0.67 0.94 0.82   * 230* 202*   JESSICA 7.9* 7.9* 7.9*     Liver Panel  Recent Labs   Lab 11/15/20  0358 11/11/20  0020   PROTTOTAL 4.9* 5.2*   ALBUMIN 3.3* 2.7*   BILITOTAL 0.2 0.3   ALKPHOS 37* 55   AST 16  29   ALT 34 44     INR  No lab results found.   Lipid Profile  No lab results found.  A1C    Recent Labs   Lab Test 11/14/20  0402   A1C 6.0*     Troponin I  No results for input(s): TROPI in the last 168 hours.

## 2020-11-15 NOTE — PROVIDER NOTIFICATION
T/o day patient continues to complain of both throat pain and chest pain related to shortness of breath and require increased Peep to 14 and adjustments to Fio2.   Patient switched from a Fentanyl drip to dilaudid currrently at .6.  Pt receiving PRN bolus dose Q2hrs as able, not effective.  Pt also continues to receive PRN Versed.  Patient now making repeated motions to remove tube and let him pass.  Motion is one in which he uses his left thumb starting at bottom of chin moves straight downward to sternum, then horizontally across both clavicles, all in one motion.  Discussed with patient plan to receive continued Pheresis with hope to gain strength.  Seroquel added earlier in day to assist with tolerating procedures and perhaps less sedation during the night.  Discussed persistent pain and difficulty with Dr. Soler.  New orders placed for Versed drip and change to PRN dilaudid bolus and frequency.

## 2020-11-15 NOTE — PROGRESS NOTES
Apheresis Treatment     Treatment in room  using [id number] apheresis machine. Consent verified.  Treatment number: 3/5    Height: 173cm  Weight: 92kg  HCT: 42%  Inlet speed: 90-100ml/min  ACDA ratio: 10:1  Fluid balance: 100%  Access: non-tunneled RIJ  Post treatment line dwell: Heparin 1,000units/ml, red port 1400 units (1.4mls), blue port 1400 units (1.4mls).  Replacement product: 3000ml 5% human albumin  Electrolyte replacement: Calcium gluconate 3grams in NS - total 80mls, piggybacked into return lines, infused over time of treatment.  Premedications: none ordered    Treatment Notes: Pt tolerated tx well today, no hypotension of bradycardia noted throughout. ICU RN informed this RN that propofol had been used during previous tx, but was not running today. Pt was noted to be very alert, able to write and C/O pain from ET tube/collar. Tx. Additionally, blood flow was reduced due to poor flow of blue limb, resulting in high return pressures. This limited BFR to 90ml/min    Treatment completed as ordered, VSS, see EPIC flowsheet for run data.    Next treatment: Tuesday 11/17/2020 - Pt will receive PlEx 11/17 and 11/19 for a total of 5 treatments, per Dr. Vila.

## 2020-11-15 NOTE — PLAN OF CARE
[facility] Intensive Care Unit   Nursing Note                                                       Neuro:  PERRL.  C/O double vision.  Moves all extremities.  Follows commands.  Frequent use of call light.  Able to write notes.  Cardiovascular:  RRR.  Hemodynamically stable off pressors.  Bradycardic at times.  Pulmonary:  Tolerating ventilator on fentanyl with frequent Versed IVP.  FiO2 80%.  Oxygen saturation > 92  GI/:  Adequate UOP.  Endocrine: Glucose well controlled.  Skin:  Intact except incisional areas.  Protective mepilex applied to sacrum.  Restraints:  Not in use or necessary.  AM labs noted; electrolytes replaced as indicated.  Required many doses of Versed for anxiety and vent tolerance.  Continue to monitor closely.    Recent Labs   Lab 11/14/20  1400 11/14/20  1235 11/11/20  0635   PH 7.34* 7.34*  --    PCO2 49* 48*  --    PO2 67* 46*  --    HCO3 26 26  --    O2PER 100% 70 60       ROUTINE IP LABS (Last four results)  BMP  Recent Labs   Lab 11/15/20  0358 11/14/20  0402 11/13/20  0515 11/12/20  0525   * 143 142 145*   POTASSIUM 4.1 3.9 3.8 3.4   CHLORIDE 115* 113* 112* 113*   JESSICA 7.9* 7.9* 7.9* 7.9*   CO2 30 27 26 28   BUN 23 19 15 20   CR 0.67 0.94 0.82 1.04   * 230* 202* 75     CBC  Recent Labs   Lab 11/15/20  0358 11/14/20  0402 11/13/20  0515 11/11/20  0635   WBC 18.4* 21.4* 7.0 12.6*   RBC 4.58 4.57 4.36* 4.44   HGB 13.8 14.0 13.1* 13.3   HCT 42.0 41.2 39.2* 41.2   MCV 92 90 90 93   MCH 30.1 30.6 30.0 30.0   MCHC 32.9 34.0 33.4 32.3   RDW 14.2 13.6 13.4 14.0    219 179 199     Blood Glucose  Glucose (mg/dL)   Date Value   11/15/2020 141 (H)   11/15/2020 146 (H)   11/14/2020 142 (H)   11/14/2020 143 (H)   11/14/2020 235 (H)   11/14/2020 232 (H)       Intake/Output Summary (Last 24 hours) at 11/15/2020 0642  Last data filed at 11/15/2020 0400  Gross per 24 hour   Intake 3639.76 ml   Output 1550 ml   Net 2089.76 ml       Brandin Orlando MSN RN PHN CCRN  [place]  [facility]  Intensive Care Unit

## 2020-11-15 NOTE — PLAN OF CARE
Pt weaned off Propofol, follows.  C/o throat and chest pain.  Fentanyl drip started and titrated up to max 100 mcg/hr.  PRN boluses given.  Nif and maximal inspiratory volume 180.  CXR performed and CT chest r/o PE.  Awaiting results.  MIVF's reduced to 50ml/hr.  Echo with Bubble ordered not completed yet.  After sedation off, pt desaturated into 89-90%.  Fio2 increased to 100% and Peep up to 12.   Fio2 now 80%.   Levophed off after Propofol Dc'd.  Daughter Piper updated at bedside.

## 2020-11-16 LAB
ANION GAP SERPL CALCULATED.3IONS-SCNC: 4 MMOL/L (ref 3–14)
BUN SERPL-MCNC: 31 MG/DL (ref 7–30)
CALCIUM SERPL-MCNC: 7.8 MG/DL (ref 8.5–10.1)
CHLORIDE SERPL-SCNC: 106 MMOL/L (ref 94–109)
CO2 SERPL-SCNC: 31 MMOL/L (ref 20–32)
CREAT SERPL-MCNC: 0.66 MG/DL (ref 0.66–1.25)
ERYTHROCYTE [DISTWIDTH] IN BLOOD BY AUTOMATED COUNT: 14.5 % (ref 10–15)
GFR SERPL CREATININE-BSD FRML MDRD: >90 ML/MIN/{1.73_M2}
GLUCOSE BLDC GLUCOMTR-MCNC: 135 MG/DL (ref 70–99)
GLUCOSE BLDC GLUCOMTR-MCNC: 151 MG/DL (ref 70–99)
GLUCOSE BLDC GLUCOMTR-MCNC: 155 MG/DL (ref 70–99)
GLUCOSE BLDC GLUCOMTR-MCNC: 170 MG/DL (ref 70–99)
GLUCOSE BLDC GLUCOMTR-MCNC: 178 MG/DL (ref 70–99)
GLUCOSE BLDC GLUCOMTR-MCNC: 185 MG/DL (ref 70–99)
GLUCOSE BLDC GLUCOMTR-MCNC: 209 MG/DL (ref 70–99)
GLUCOSE SERPL-MCNC: 166 MG/DL (ref 70–99)
HCT VFR BLD AUTO: 46.2 % (ref 40–53)
HGB BLD-MCNC: 15.2 G/DL (ref 13.3–17.7)
INTERPRETATION ECG - MUSE: NORMAL
MAGNESIUM SERPL-MCNC: 2.6 MG/DL (ref 1.6–2.3)
MCH RBC QN AUTO: 30.4 PG (ref 26.5–33)
MCHC RBC AUTO-ENTMCNC: 32.9 G/DL (ref 31.5–36.5)
MCV RBC AUTO: 92 FL (ref 78–100)
PHOSPHATE SERPL-MCNC: 2.9 MG/DL (ref 2.5–4.5)
PLATELET # BLD AUTO: 180 10E9/L (ref 150–450)
POTASSIUM SERPL-SCNC: 4.2 MMOL/L (ref 3.4–5.3)
RBC # BLD AUTO: 5 10E12/L (ref 4.4–5.9)
SODIUM SERPL-SCNC: 141 MMOL/L (ref 133–144)
WBC # BLD AUTO: 18.9 10E9/L (ref 4–11)

## 2020-11-16 PROCEDURE — 85027 COMPLETE CBC AUTOMATED: CPT | Performed by: INTERNAL MEDICINE

## 2020-11-16 PROCEDURE — 94640 AIRWAY INHALATION TREATMENT: CPT

## 2020-11-16 PROCEDURE — 250N000011 HC RX IP 250 OP 636: Performed by: INTERNAL MEDICINE

## 2020-11-16 PROCEDURE — 99291 CRITICAL CARE FIRST HOUR: CPT | Performed by: PSYCHIATRY & NEUROLOGY

## 2020-11-16 PROCEDURE — C9113 INJ PANTOPRAZOLE SODIUM, VIA: HCPCS | Performed by: INTERNAL MEDICINE

## 2020-11-16 PROCEDURE — 272N000078 HC NUTRITION PRODUCT INTERMEDIATE LITER

## 2020-11-16 PROCEDURE — 84100 ASSAY OF PHOSPHORUS: CPT | Performed by: INTERNAL MEDICINE

## 2020-11-16 PROCEDURE — 999N000157 HC STATISTIC RCP TIME EA 10 MIN

## 2020-11-16 PROCEDURE — 250N000013 HC RX MED GY IP 250 OP 250 PS 637: Performed by: INTERNAL MEDICINE

## 2020-11-16 PROCEDURE — 94640 AIRWAY INHALATION TREATMENT: CPT | Mod: 76

## 2020-11-16 PROCEDURE — 258N000003 HC RX IP 258 OP 636: Performed by: INTERNAL MEDICINE

## 2020-11-16 PROCEDURE — 999N000078 HC STATISTIC INTRAPULMONARY PERCUSSIVE VENT

## 2020-11-16 PROCEDURE — 80048 BASIC METABOLIC PNL TOTAL CA: CPT | Performed by: INTERNAL MEDICINE

## 2020-11-16 PROCEDURE — 200N000001 HC R&B ICU

## 2020-11-16 PROCEDURE — 999N000009 HC STATISTIC AIRWAY CARE

## 2020-11-16 PROCEDURE — 250N000009 HC RX 250: Performed by: INTERNAL MEDICINE

## 2020-11-16 PROCEDURE — 999N001017 HC STATISTIC GLUCOSE BY METER IP

## 2020-11-16 PROCEDURE — 94003 VENT MGMT INPAT SUBQ DAY: CPT

## 2020-11-16 PROCEDURE — 272N000083 HC NUTRITION PRODUCT SEMIELEM INTERMED LITER

## 2020-11-16 PROCEDURE — 83735 ASSAY OF MAGNESIUM: CPT | Performed by: INTERNAL MEDICINE

## 2020-11-16 PROCEDURE — 99291 CRITICAL CARE FIRST HOUR: CPT | Performed by: INTERNAL MEDICINE

## 2020-11-16 RX ADMIN — SODIUM CHLORIDE 500 MG: 9 INJECTION, SOLUTION INTRAVENOUS at 05:33

## 2020-11-16 RX ADMIN — HEPARIN SODIUM 5000 UNITS: 5000 INJECTION, SOLUTION INTRAVENOUS; SUBCUTANEOUS at 21:16

## 2020-11-16 RX ADMIN — MULTIVITAMIN 15 ML: LIQUID ORAL at 08:26

## 2020-11-16 RX ADMIN — AMPICILLIN AND SULBACTAM 3 G: 2; 1 INJECTION, POWDER, FOR SOLUTION INTRAMUSCULAR; INTRAVENOUS at 11:15

## 2020-11-16 RX ADMIN — PANTOPRAZOLE SODIUM 40 MG: 40 INJECTION, POWDER, FOR SOLUTION INTRAVENOUS at 08:26

## 2020-11-16 RX ADMIN — CARBOXYMETHYLCELLULOSE SODIUM 1 DROP: 5 SOLUTION/ DROPS OPHTHALMIC at 05:33

## 2020-11-16 RX ADMIN — CARBOXYMETHYLCELLULOSE SODIUM 1 DROP: 5 SOLUTION/ DROPS OPHTHALMIC at 21:17

## 2020-11-16 RX ADMIN — SULFAMETHOXAZOLE AND TRIMETHOPRIM 80 MG: 200; 40 SUSPENSION ORAL at 08:25

## 2020-11-16 RX ADMIN — QUETIAPINE 25 MG: 25 TABLET ORAL at 20:23

## 2020-11-16 RX ADMIN — CARBOXYMETHYLCELLULOSE SODIUM 1 DROP: 5 SOLUTION/ DROPS OPHTHALMIC at 12:26

## 2020-11-16 RX ADMIN — Medication 0.6 MG/HR: at 20:23

## 2020-11-16 RX ADMIN — CARBOXYMETHYLCELLULOSE SODIUM 1 DROP: 5 SOLUTION/ DROPS OPHTHALMIC at 16:31

## 2020-11-16 RX ADMIN — SODIUM CHLORIDE 500 MG: 9 INJECTION, SOLUTION INTRAVENOUS at 17:42

## 2020-11-16 RX ADMIN — CARBOXYMETHYLCELLULOSE SODIUM 1 DROP: 5 SOLUTION/ DROPS OPHTHALMIC at 20:23

## 2020-11-16 RX ADMIN — CHLORHEXIDINE GLUCONATE 0.12% ORAL RINSE 15 ML: 1.2 LIQUID ORAL at 08:37

## 2020-11-16 RX ADMIN — AMLODIPINE BESYLATE 10 MG: 10 TABLET ORAL at 08:26

## 2020-11-16 RX ADMIN — HEPARIN SODIUM 5000 UNITS: 5000 INJECTION, SOLUTION INTRAVENOUS; SUBCUTANEOUS at 05:33

## 2020-11-16 RX ADMIN — CARBOXYMETHYLCELLULOSE SODIUM 1 DROP: 5 SOLUTION/ DROPS OPHTHALMIC at 11:16

## 2020-11-16 RX ADMIN — QUETIAPINE 25 MG: 25 TABLET ORAL at 08:26

## 2020-11-16 RX ADMIN — MIDAZOLAM (PF) 1 MG/ML IN 0.9 % SODIUM CHLORIDE INTRAVENOUS SOLUTION 4 MG/HR: at 09:22

## 2020-11-16 RX ADMIN — CHLORHEXIDINE GLUCONATE 0.12% ORAL RINSE 15 ML: 1.2 LIQUID ORAL at 21:16

## 2020-11-16 RX ADMIN — CARBOXYMETHYLCELLULOSE SODIUM 1 DROP: 5 SOLUTION/ DROPS OPHTHALMIC at 17:42

## 2020-11-16 RX ADMIN — ALBUTEROL SULFATE 2.5 MG: 2.5 SOLUTION RESPIRATORY (INHALATION) at 19:30

## 2020-11-16 RX ADMIN — CARBOXYMETHYLCELLULOSE SODIUM 1 DROP: 5 SOLUTION/ DROPS OPHTHALMIC at 08:29

## 2020-11-16 RX ADMIN — AMPICILLIN AND SULBACTAM 3 G: 2; 1 INJECTION, POWDER, FOR SOLUTION INTRAMUSCULAR; INTRAVENOUS at 21:17

## 2020-11-16 RX ADMIN — CARBOXYMETHYLCELLULOSE SODIUM 1 DROP: 5 SOLUTION/ DROPS OPHTHALMIC at 14:00

## 2020-11-16 RX ADMIN — AMPICILLIN AND SULBACTAM 3 G: 2; 1 INJECTION, POWDER, FOR SOLUTION INTRAMUSCULAR; INTRAVENOUS at 16:39

## 2020-11-16 RX ADMIN — AMPICILLIN AND SULBACTAM 3 G: 2; 1 INJECTION, POWDER, FOR SOLUTION INTRAMUSCULAR; INTRAVENOUS at 03:38

## 2020-11-16 RX ADMIN — HEPARIN SODIUM 5000 UNITS: 5000 INJECTION, SOLUTION INTRAVENOUS; SUBCUTANEOUS at 14:00

## 2020-11-16 ASSESSMENT — ACTIVITIES OF DAILY LIVING (ADL)
ADLS_ACUITY_SCORE: 26
ADLS_ACUITY_SCORE: 24

## 2020-11-16 ASSESSMENT — MIFFLIN-ST. JEOR: SCORE: 1715.25

## 2020-11-16 NOTE — PROGRESS NOTES
[facility] ICU RESPIRATORY NOTE        Date of Admission: 11/10/2020    Date of Intubation (most recent): 11/10/20    Reason for Mechanical Ventilation: Resp Failure    Number of Days on Mechanical Ventilation: 7    Met Criteria for Spontaneous Breathing Trial: Yes - 10/10 40% for 90 minutes    Significant Events Today: PEEP decreased to 10 from 14. FiO2 decreased to 40% from 60%.   NIF -5 and VC 320ml    ABG Results:   Recent Labs   Lab 11/14/20  1400 11/14/20  1235 11/11/20  0635   PH 7.34* 7.34*  --    PCO2 49* 48*  --    PO2 67* 46*  --    HCO3 26 26  --    O2PER 100% 70 60       Current Vent Settings: Ventilation Mode: CMV/AC  (Continuous Mandatory Ventilation/ Assist Control)  FiO2 (%): 40 %  Rate Set (breaths/minute): 16 breaths/min  Tidal Volume Set (mL): 450 mL  PEEP (cm H2O): 10 cmH2O  Pressure Support (cm H2O): 10 cmH2O  Oxygen Concentration (%): 40 %  Resp: 16      Plan: Continue vent support overnight, daily SBT, NIF and VC    Celena Callejas, RT

## 2020-11-16 NOTE — PLAN OF CARE
Pt continues to have fluctuating Fio2 needs along with an increase in Peep to 14 this morning.  Pt endorses Anxiety and both throat and chest pain/SOB.  Pt's face and chest remain reddened/blanchable, not itchy or painful.  ETT advanced to 25 at Lip.  Pt complains of pain without relief in throat and chest.  Fentanyl drip switched to Dilaudid with PRN boluses(not effective).  PRN Versed continued to be used and eventually a Versed drip was started.  3rd Plasma Pheresis completed this a.m.  Echo with Bubble study completed this a.m.  Seroquel, Amlodipine, Bactrim and 200ml H2O Q4hr flushes started.

## 2020-11-16 NOTE — PROGRESS NOTES
" Renal Medicine Progress Note            Assessment/Plan:     # Myasthenia gravis: Newly diagnosed and presented with crisis.    -3/5 PLEX   -on prednisone  # Hypoxic respiratory failure: Aspiration PNA.    -Unasyn    # FEN: Up ~ 10 kg. Excellent urine output. Electrolytes are good.     # HTN: On Norvasc.     Plan:  # Plasmapheresis on Tuesday and Thursday to complete 5/5 treatments per neurology request        Interval History:     Afebrile. VSS. Pt is intubated and sedated. He is sitting in  chair. On Versed. Not responding. Wt is up from 82 kg to 92 kg. Excellent urine output.           Medications and Allergies:       amLODIPine  10 mg Per Feeding Tube Daily     ampicillin-sulbactam (UNASYN) IV  3 g Intravenous Q6H     artificial tears ophthalmic solution  1 drop Both Eyes Q2H While awake     chlorhexidine  15 mL Mouth/Throat Q12H     heparin ANTICOAGULANT  5,000 Units Subcutaneous Q8H     insulin aspart  1-6 Units Subcutaneous Q4H     methylPREDNISolone  500 mg Intravenous Q12H     pantoprazole (PROTONIX) IV  40 mg Intravenous Daily     QUEtiapine  25 mg Oral BID     sulfamethoxazole-trimethoprim  10 mL Per Feeding Tube Daily      No Known Allergies         Physical Exam:   Vitals were reviewed   , Blood pressure 135/74, pulse 102, temperature 97.8  F (36.6  C), temperature source Axillary, resp. rate 15, height 1.73 m (5' 8.11\"), weight 92.4 kg (203 lb 11.3 oz), SpO2 95 %.    Wt Readings from Last 3 Encounters:   11/16/20 92.4 kg (203 lb 11.3 oz)       Intake/Output Summary (Last 24 hours) at 11/16/2020 1221  Last data filed at 11/16/2020 0600  Gross per 24 hour   Intake 2379.02 ml   Output 1550 ml   Net 829.02 ml       GENERAL APPEARANCE: Critically ill  HEENT:  Eyes close. Intubated.   RESP: No wheezes or crackles.   CV: RRR, nl S1/S2, no m/r/g   ABDOMEN: obese, soft, NT  EXTREMITIES/SKIN: no rashes/lesions on observed skin; ++ edema  NEURO: Sedated and not responding         Data:     CBC RESULTS:   "   Recent Labs   Lab 11/16/20  0430 11/15/20  0358 11/14/20  0402 11/13/20  0515 11/11/20  0635 11/11/20  0020   WBC 18.9* 18.4* 21.4* 7.0 12.6* 11.6*   RBC 5.00 4.58 4.57 4.36* 4.44 4.44   HGB 15.2 13.8 14.0 13.1* 13.3 13.3   HCT 46.2 42.0 41.2 39.2* 41.2 41.6    178 219 179 199 221       Basic Metabolic Panel:  Recent Labs   Lab 11/16/20  0430 11/15/20  0358 11/14/20  0402 11/13/20  0515 11/12/20  0525 11/11/20  0635    147* 143 142 145* 144   POTASSIUM 4.2 4.1 3.9 3.8 3.4 3.8   CHLORIDE 106 115* 113* 112* 113* 113*   CO2 31 30 27 26 28 26   BUN 31* 23 19 15 20 23   CR 0.66 0.67 0.94 0.82 1.04 0.95   * 156* 230* 202* 75 77   JESSICA 7.8* 7.9* 7.9* 7.9* 7.9* 7.9*       INRNo lab results found in last 7 days.   Attestation:   I have reviewed today's relevant vital signs, notes, medications, labs and imaging.    Waldo Thakkar MD  [facility] - Nephrology  Office phone :[phone]  Pager: [phone]

## 2020-11-16 NOTE — PROGRESS NOTES
CLINICAL NUTRITION SERVICES - REASSESSMENT NOTE      Recommendations Ordered by Registered Dietitian (RD): Change TF to goal of Isosource 1.5 at 65 mL/hr to provide:  2340 kcal (29 kcal/kg), 106 g protein (1.3 g/kg), 275 g CHO, 23 g fiber, 1186 mL H2O  Discontinue the Certavite (no longer needed)   Malnutrition: (11/11)  % Weight Loss:  > 5% in 1 month (severe malnutrition)  % Intake:  Unable to determine  Subcutaneous Fat Loss: Unable to determine  Muscle Loss:  Unable to determine, but suspect  Fluid Retention:  None noted     Malnutrition Diagnosis: Unable to determine due to inability to perform Nutrition Focused Physical Exam       EVALUATION OF PROGRESS TOWARD GOALS   Diet:  NPO on vent     Nutrition Support:  Patient continues on goal TF as follows ~    Nutrition Support Enteral:  Type of Feeding Tube: NG  Enteral Frequency:  Continuous  Enteral Regimen: Peptamen Intense VHP at 55 mL/hr  Total Enteral Provisions: 1320 kcal (16 kcal/kg), 121 g protein (1.5 g/kg), 1109 mL H2O, 5 g fiber, 100 g CHO  Free Water Flush: 200 mL every 4 hours       Intake/Tolerance:    K and Phos normal, Mg 2.6 (H)  Stool 250 mL   -173 with Medium sliding scale insulin and Solumedrol   I/O 3322/3225, weight 92.4 kg (up 10.6 kg from admit)    Patient is now day #6 preliminary needs and therefore ready to advance to goal energy provisions       ASSESSED NUTRITION NEEDS:  Dosing Weight 81.8 kg (admit wt)  Estimated Energy Needs: 2385-1808 kcals (25-30 Kcal/Kg)  Justification: maintenance  Estimated Protein Needs:  grams protein (1.2-1.5 g pro/Kg)  Justification: repletion      NEW FINDINGS:   Propofol d/c'd 11/14  D5 IVF d/c'd 11/15  Pressor off 11/14  Plasmapheresis continues     Receiving Certavite daily for supplementation     Previous Goals (11/13):   TF + Propofol (with or without D5 containing IVF) will meet % estimated needs  Evaluation: Not met    Previous Nutrition Diagnosis (11/13):   Inadequate protein  intake related to low TF rate of isocaloric product as evidenced by TF meeting 86% protein needs  Evaluation: Resolved       CURRENT NUTRITION DIAGNOSIS  Inadequate energy intake related to TF at 55 mL/hr (1.0 kcal/mL formula), Propofol and D5 now off as evidenced by meeting ~2/3 energy needs from current TF regimen    INTERVENTIONS  Recommendations / Nutrition Prescription  Change TF to goal of Isosource 1.5 at 65 mL/hr to provide:  2340 kcal (29 kcal/kg), 106 g protein (1.3 g/kg), 275 g CHO, 23 g fiber, 1186 mL H2O  Discontinue the Certavite (no longer needed)    Implementation  EN Composition:  Above TF orders written  Multivitamin/Mineral:  Discontinued as above  Collaboration and Referral of Nutrition care:  Patient discussed today during interdisciplinary bedside rounds     Goals  TF will meet % needs     MONITORING AND EVALUATION:  Progress towards goals will be monitored and evaluated per protocol and Practice Guidelines    Tanisha Echols RD, LD, CNSC   Clinical Dietitian - [facility]

## 2020-11-16 NOTE — PLAN OF CARE
Intubated/sedated. Slept majority of night. Opens eyes to voice and follows commands, able to write on paper to communicate needs, shakes head no when asked about pain, Dilaudid and versed gtt infusing. [place] and rectal tube in place. TF at goal. Restrained.

## 2020-11-16 NOTE — PROGRESS NOTES
"[facility]    Stroke/Neurocritical Care Progress Note    Interval Events  PLEX #3 completed yesterday, tolerated well, got switched from fentanyl to hydromorphone overnight and this has been much more effective at alleviating his pain. He is more sleepy this morning but will do a vent weaning trial later in the day.    Impression  1. 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    2. Respiratory paralysis- secondary to #1, following daily inspiratory forces/tidal volumes in response to therapy    3. Pain/sedation- to tolerate intubation    4. Lung consolidation- consider aspiration secondary to bulbar weakness/dysphagia and low lung volumes, management per Intensivist    Plan  Continue plasma exchange every other day, plan for 5 sessions total (3/5, through 11/19/2020)  Methylprednisolone 500 mg IV q12 x10 doses (8/10)  Daily MIP and tidal volume per RT (trend documented in flowsheets, and in physical exam of this note)  Will start pyridostigmine prior to extubation readiness  Anticipate need for steroid taper post and initiation of steroid sparing agent as an outpatient  Can follow up with Dr. Greenwood of [facility] post-hospital for ongoing management  Remainder of cares per Intensivist    The Stroke/Neurocritical Care Staff is Dr. Delaney.     Vicki Burns MD  Vascular Neurology Fellow  To page me or covering stroke neurology team member, click here: AMCOM   Choose \"On Call\" tab at top, then search dropdown box for \"Neurology Adult\", select location, press Enter, then look for stroke/neuro ICU/telestroke.    ______________________________________________________    Medications   Home Meds  Prior to Admission medications    Medication Sig Start Date End " Date Taking? Authorizing Provider   amoxicillin-clavulanate (AUGMENTIN) 875-125 MG tablet Take 1 tablet by mouth 2 times daily X 10 days (started 11/9/2020)   Yes Unknown, Entered By History   HYDROcodone-acetaminophen (NORCO) 5-325 MG tablet Take 1-2 tablets by mouth 2 times daily as needed for severe pain    Yes Unknown, Entered By History   predniSONE (DELTASONE) 20 MG tablet Take 20 mg by mouth 2 times daily X 5 days (started 11/9/2020)   Yes Unknown, Entered By History   tamsulosin (FLOMAX) 0.4 MG capsule Take 0.4 mg by mouth daily   Yes Unknown, Entered By History       Scheduled Meds    amLODIPine  10 mg Per Feeding Tube Daily     ampicillin-sulbactam (UNASYN) IV  3 g Intravenous Q6H     artificial tears ophthalmic solution  1 drop Both Eyes Q2H While awake     chlorhexidine  15 mL Mouth/Throat Q12H     heparin ANTICOAGULANT  5,000 Units Subcutaneous Q8H     insulin aspart  1-6 Units Subcutaneous Q4H     methylPREDNISolone  500 mg Intravenous Q12H     multivitamins w/minerals  15 mL Per Feeding Tube Daily     pantoprazole (PROTONIX) IV  40 mg Intravenous Daily     QUEtiapine  25 mg Oral BID     sulfamethoxazole-trimethoprim  10 mL Per Feeding Tube Daily       Infusion Meds    fentaNYL Stopped (11/15/20 1028)     HYDROmorphone 0.6 mg/hr (11/16/20 0537)     midazolam 5 mg/hr (11/16/20 0548)     sodium chloride 20 mL/hr at 11/15/20 1944       PRN Meds  sodium chloride 0.9%, acetaminophen **OR** acetaminophen, albuterol, calcium gluconate, calcium gluconate, glucose **OR** dextrose **OR** glucagon, diphenhydrAMINE, fentaNYL, heparin Lock (1000 units/mL High concentration), hydromorphone, midazolam, naloxone, ondansetron **OR** ondansetron, senna-docusate **OR** senna-docusate       PHYSICAL EXAMINATION  Temp:  [97.7  F (36.5  C)-98.4  F (36.9  C)] 97.8  F (36.6  C)  Pulse:  [] 73  Resp:  [9-27] 16  BP: ()/() 125/81  FiO2 (%):  [70 %] 70 %  SpO2:  [91 %-100 %] 95 %     Date 11/13 11/14 11/15  11/16   [facility]/NIF -7 -4 -3    Vital Capacity 290 180 240        General:  patient lying in bed without any acute distress    HEENT:  normocephalic/atraumatic, intubated  Cardio:  RRR  Pulmonary:  on mechanical ventilation  Abdomen:  soft, non-tender, non-distended  Extremities:  no edema, peripheral pulses palpable  Skin:  intact, warm/dry      Neurologic  Mental Status:  examined on dilaudid/versed. Awake, alert and attentive. Follows complex commands, shakes/nods/thumbs up/down appropriately.  Cranial Nerves:  VFF, PERRL, ptosis present again, conjugate rest gaze, extraocular movements improved to L, up, and down, still largely restricted to R, lower facial musculature obscured by ETT, but now able to fully purse lips around tube,  tongue protrudes, obscured by ETT  Motor: movements are more brisk today, elbow flexion 4/5, elbow extension 4-/5, finger flexion 5/5, finger extension 4/5. Hip flexion 4/5, knee flexion 4+/5, knee extension 4+/5, ankle dorsiflexion 5/5, ankle plantarflexion 5/5. No fasciculations, twitches, or abnormal movements.  Reflexes:  2+ intact and symmetric reflexes in bilateral biceps, brachioradialis, patellae, achilles, toes downgoing  Sensory:  light touch sensation intact and symmetric throughout upper and lower extremities  Coordination:  not tested  Station/Gait:  not tested    Imaging  I personally reviewed all imaging; relevant findings per HPI.     Lab Results Data   CBC  Recent Labs   Lab 11/16/20  0430 11/15/20  0358 11/14/20  0402   WBC 18.9* 18.4* 21.4*   RBC 5.00 4.58 4.57   HGB 15.2 13.8 14.0   HCT 46.2 42.0 41.2    178 219     Basic Metabolic Panel    Recent Labs   Lab 11/16/20  0430 11/15/20  0358 11/14/20  0402    147* 143   POTASSIUM 4.2 4.1 3.9   CHLORIDE 106 115* 113*   CO2 31 30 27   BUN 31* 23 19   CR 0.66 0.67 0.94   * 156* 230*   JESSICA 7.8* 7.9* 7.9*     Liver Panel  Recent Labs   Lab 11/15/20  0358 11/11/20  0020   PROTTOTAL 4.9* 5.2*   ALBUMIN 3.3*  2.7*   BILITOTAL 0.2 0.3   ALKPHOS 37* 55   AST 16 29   ALT 34 44     INR  No lab results found.   Lipid Profile  No lab results found.  A1C    Recent Labs   Lab Test 11/14/20  0402   A1C 6.0*     Troponin I  No results for input(s): TROPI in the last 168 hours.
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
## 2020-11-17 ENCOUNTER — APPOINTMENT (OUTPATIENT)
Dept: GENERAL RADIOLOGY | Facility: CLINIC | Age: 60
DRG: 003 | End: 2020-11-17
Attending: INTERNAL MEDICINE
Payer: COMMERCIAL

## 2020-11-17 ENCOUNTER — ANESTHESIA (OUTPATIENT)
Dept: INTENSIVE CARE | Facility: CLINIC | Age: 60
DRG: 003 | End: 2020-11-17
Payer: COMMERCIAL

## 2020-11-17 ENCOUNTER — ANESTHESIA EVENT (OUTPATIENT)
Dept: INTENSIVE CARE | Facility: CLINIC | Age: 60
DRG: 003 | End: 2020-11-17
Payer: COMMERCIAL

## 2020-11-17 LAB
ANION GAP SERPL CALCULATED.3IONS-SCNC: 2 MMOL/L (ref 3–14)
BACTERIA SPEC CULT: NO GROWTH
BASE EXCESS BLDA CALC-SCNC: 4.1 MMOL/L
BASE EXCESS BLDA CALC-SCNC: 4.3 MMOL/L
BUN SERPL-MCNC: 37 MG/DL (ref 7–30)
CALCIUM SERPL-MCNC: 7.7 MG/DL (ref 8.5–10.1)
CHLORIDE SERPL-SCNC: 108 MMOL/L (ref 94–109)
CO2 SERPL-SCNC: 33 MMOL/L (ref 20–32)
CREAT SERPL-MCNC: 0.6 MG/DL (ref 0.66–1.25)
ERYTHROCYTE [DISTWIDTH] IN BLOOD BY AUTOMATED COUNT: 14.5 % (ref 10–15)
FIBRINOGEN PPP-MCNC: 97 MG/DL (ref 200–420)
GFR SERPL CREATININE-BSD FRML MDRD: >90 ML/MIN/{1.73_M2}
GLUCOSE BLDC GLUCOMTR-MCNC: 145 MG/DL (ref 70–99)
GLUCOSE BLDC GLUCOMTR-MCNC: 169 MG/DL (ref 70–99)
GLUCOSE BLDC GLUCOMTR-MCNC: 172 MG/DL (ref 70–99)
GLUCOSE BLDC GLUCOMTR-MCNC: 175 MG/DL (ref 70–99)
GLUCOSE BLDC GLUCOMTR-MCNC: 182 MG/DL (ref 70–99)
GLUCOSE BLDC GLUCOMTR-MCNC: 203 MG/DL (ref 70–99)
GLUCOSE SERPL-MCNC: 226 MG/DL (ref 70–99)
HCO3 BLD-SCNC: 33 MMOL/L (ref 21–28)
HCO3 BLD-SCNC: 33 MMOL/L (ref 21–28)
HCT VFR BLD AUTO: 44.3 % (ref 40–53)
HGB BLD-MCNC: 14.4 G/DL (ref 13.3–17.7)
INR PPP: 1.2 (ref 0.86–1.14)
Lab: NORMAL
Lab: NORMAL
MCH RBC QN AUTO: 29.9 PG (ref 26.5–33)
MCHC RBC AUTO-ENTMCNC: 32.5 G/DL (ref 31.5–36.5)
MCV RBC AUTO: 92 FL (ref 78–100)
O2/TOTAL GAS SETTING VFR VENT: ABNORMAL %
O2/TOTAL GAS SETTING VFR VENT: ABNORMAL %
OXYHGB MFR BLD: 93 % (ref 92–100)
OXYHGB MFR BLD: 95 % (ref 92–100)
PCO2 BLD: 63 MM HG (ref 35–45)
PCO2 BLD: 65 MM HG (ref 35–45)
PH BLD: 7.31 PH (ref 7.35–7.45)
PH BLD: 7.32 PH (ref 7.35–7.45)
PLATELET # BLD AUTO: 171 10E9/L (ref 150–450)
PO2 BLD: 74 MM HG (ref 80–105)
PO2 BLD: 82 MM HG (ref 80–105)
POTASSIUM SERPL-SCNC: 4.2 MMOL/L (ref 3.4–5.3)
RBC # BLD AUTO: 4.82 10E12/L (ref 4.4–5.9)
SODIUM SERPL-SCNC: 143 MMOL/L (ref 133–144)
SPECIMEN SOURCE: NORMAL
WBC # BLD AUTO: 13.4 10E9/L (ref 4–11)

## 2020-11-17 PROCEDURE — 250N000013 HC RX MED GY IP 250 OP 250 PS 637: Performed by: INTERNAL MEDICINE

## 2020-11-17 PROCEDURE — P9041 ALBUMIN (HUMAN),5%, 50ML: HCPCS | Performed by: INTERNAL MEDICINE

## 2020-11-17 PROCEDURE — 36514 APHERESIS PLASMA: CPT

## 2020-11-17 PROCEDURE — 36600 WITHDRAWAL OF ARTERIAL BLOOD: CPT

## 2020-11-17 PROCEDURE — 999N001017 HC STATISTIC GLUCOSE BY METER IP

## 2020-11-17 PROCEDURE — 99291 CRITICAL CARE FIRST HOUR: CPT | Mod: GC | Performed by: INTERNAL MEDICINE

## 2020-11-17 PROCEDURE — 80048 BASIC METABOLIC PNL TOTAL CA: CPT | Performed by: INTERNAL MEDICINE

## 2020-11-17 PROCEDURE — 85027 COMPLETE CBC AUTOMATED: CPT | Performed by: INTERNAL MEDICINE

## 2020-11-17 PROCEDURE — 94150 VITAL CAPACITY TEST: CPT

## 2020-11-17 PROCEDURE — 272N000078 HC NUTRITION PRODUCT INTERMEDIATE LITER

## 2020-11-17 PROCEDURE — 250N000012 HC RX MED GY IP 250 OP 636 PS 637: Performed by: STUDENT IN AN ORGANIZED HEALTH CARE EDUCATION/TRAINING PROGRAM

## 2020-11-17 PROCEDURE — 250N000011 HC RX IP 250 OP 636: Performed by: INTERNAL MEDICINE

## 2020-11-17 PROCEDURE — 200N000001 HC R&B ICU

## 2020-11-17 PROCEDURE — 999N000009 HC STATISTIC AIRWAY CARE

## 2020-11-17 PROCEDURE — C9113 INJ PANTOPRAZOLE SODIUM, VIA: HCPCS | Performed by: INTERNAL MEDICINE

## 2020-11-17 PROCEDURE — 999N000157 HC STATISTIC RCP TIME EA 10 MIN

## 2020-11-17 PROCEDURE — 94003 VENT MGMT INPAT SUBQ DAY: CPT

## 2020-11-17 PROCEDURE — 85610 PROTHROMBIN TIME: CPT | Performed by: INTERNAL MEDICINE

## 2020-11-17 PROCEDURE — 250N000009 HC RX 250: Performed by: INTERNAL MEDICINE

## 2020-11-17 PROCEDURE — 71045 X-RAY EXAM CHEST 1 VIEW: CPT

## 2020-11-17 PROCEDURE — 258N000003 HC RX IP 258 OP 636: Performed by: NURSE ANESTHETIST, CERTIFIED REGISTERED

## 2020-11-17 PROCEDURE — 82805 BLOOD GASES W/O2 SATURATION: CPT | Performed by: INTERNAL MEDICINE

## 2020-11-17 PROCEDURE — 258N000003 HC RX IP 258 OP 636: Performed by: INTERNAL MEDICINE

## 2020-11-17 PROCEDURE — 99232 SBSQ HOSP IP/OBS MODERATE 35: CPT | Mod: GC | Performed by: PSYCHIATRY & NEUROLOGY

## 2020-11-17 PROCEDURE — 85384 FIBRINOGEN ACTIVITY: CPT | Performed by: INTERNAL MEDICINE

## 2020-11-17 PROCEDURE — 999N000011 HC STATISTIC ANESTHESIA CASE

## 2020-11-17 PROCEDURE — 250N000011 HC RX IP 250 OP 636: Performed by: NURSE ANESTHETIST, CERTIFIED REGISTERED

## 2020-11-17 PROCEDURE — 31500 INSERT EMERGENCY AIRWAY: CPT | Performed by: NURSE ANESTHETIST, CERTIFIED REGISTERED

## 2020-11-17 RX ORDER — FUROSEMIDE 10 MG/ML
40 INJECTION INTRAMUSCULAR; INTRAVENOUS ONCE
Status: COMPLETED | OUTPATIENT
Start: 2020-11-17 | End: 2020-11-17

## 2020-11-17 RX ORDER — DIPHENHYDRAMINE HYDROCHLORIDE 50 MG/ML
50 INJECTION INTRAMUSCULAR; INTRAVENOUS
Status: DISCONTINUED | OUTPATIENT
Start: 2020-11-17 | End: 2020-11-19

## 2020-11-17 RX ORDER — CALCIUM GLUCONATE 94 MG/ML
1 INJECTION, SOLUTION INTRAVENOUS
Status: DISCONTINUED | OUTPATIENT
Start: 2020-11-17 | End: 2020-11-19

## 2020-11-17 RX ORDER — PROPOFOL 10 MG/ML
INJECTION, EMULSION INTRAVENOUS
Status: DISCONTINUED | OUTPATIENT
Start: 2020-11-17 | End: 2020-11-17

## 2020-11-17 RX ORDER — PROPOFOL 10 MG/ML
INJECTION, EMULSION INTRAVENOUS PRN
Status: DISCONTINUED | OUTPATIENT
Start: 2020-11-17 | End: 2020-11-17

## 2020-11-17 RX ORDER — HEPARIN SODIUM 1000 [USP'U]/ML
1.5 INJECTION, SOLUTION INTRAVENOUS; SUBCUTANEOUS
Status: DISCONTINUED | OUTPATIENT
Start: 2020-11-17 | End: 2020-11-19

## 2020-11-17 RX ORDER — PREDNISONE 20 MG/1
60 TABLET ORAL DAILY
Status: DISCONTINUED | OUTPATIENT
Start: 2020-11-17 | End: 2020-11-24

## 2020-11-17 RX ORDER — NYSTATIN 100000/ML
500000 SUSPENSION, ORAL (FINAL DOSE FORM) ORAL 4 TIMES DAILY
Status: DISCONTINUED | OUTPATIENT
Start: 2020-11-17 | End: 2020-12-05 | Stop reason: HOSPADM

## 2020-11-17 RX ORDER — ALBUMIN HUMAN 25 %
3000 INTRAVENOUS SOLUTION INTRAVENOUS
Status: COMPLETED | OUTPATIENT
Start: 2020-11-17 | End: 2020-11-17

## 2020-11-17 RX ORDER — DEXMEDETOMIDINE HYDROCHLORIDE 4 UG/ML
0.2-0.7 INJECTION, SOLUTION INTRAVENOUS CONTINUOUS
Status: DISCONTINUED | OUTPATIENT
Start: 2020-11-17 | End: 2020-11-18

## 2020-11-17 RX ADMIN — CARBOXYMETHYLCELLULOSE SODIUM 1 DROP: 5 SOLUTION/ DROPS OPHTHALMIC at 19:02

## 2020-11-17 RX ADMIN — CARBOXYMETHYLCELLULOSE SODIUM 1 DROP: 5 SOLUTION/ DROPS OPHTHALMIC at 10:22

## 2020-11-17 RX ADMIN — QUETIAPINE 25 MG: 25 TABLET ORAL at 08:16

## 2020-11-17 RX ADMIN — PANTOPRAZOLE SODIUM 40 MG: 40 INJECTION, POWDER, FOR SOLUTION INTRAVENOUS at 08:16

## 2020-11-17 RX ADMIN — CARBOXYMETHYLCELLULOSE SODIUM 1 DROP: 5 SOLUTION/ DROPS OPHTHALMIC at 23:11

## 2020-11-17 RX ADMIN — PREDNISONE 60 MG: 20 TABLET ORAL at 13:26

## 2020-11-17 RX ADMIN — PROPOFOL 200 MG: 10 INJECTION, EMULSION INTRAVENOUS at 15:29

## 2020-11-17 RX ADMIN — NYSTATIN 500000 UNITS: 100000 SUSPENSION ORAL at 13:26

## 2020-11-17 RX ADMIN — ALBUMIN HUMAN 3000 ML: 0.05 INJECTION, SOLUTION INTRAVENOUS at 09:17

## 2020-11-17 RX ADMIN — AMPICILLIN AND SULBACTAM 3 G: 2; 1 INJECTION, POWDER, FOR SOLUTION INTRAMUSCULAR; INTRAVENOUS at 16:22

## 2020-11-17 RX ADMIN — AMLODIPINE BESYLATE 10 MG: 10 TABLET ORAL at 11:02

## 2020-11-17 RX ADMIN — CHLORHEXIDINE GLUCONATE 0.12% ORAL RINSE 15 ML: 1.2 LIQUID ORAL at 08:17

## 2020-11-17 RX ADMIN — AMPICILLIN AND SULBACTAM 3 G: 2; 1 INJECTION, POWDER, FOR SOLUTION INTRAMUSCULAR; INTRAVENOUS at 23:11

## 2020-11-17 RX ADMIN — SULFAMETHOXAZOLE AND TRIMETHOPRIM 80 MG: 200; 40 SUSPENSION ORAL at 08:16

## 2020-11-17 RX ADMIN — DEXMEDETOMIDINE 0.2 MCG/KG/HR: 100 INJECTION, SOLUTION, CONCENTRATE INTRAVENOUS at 16:00

## 2020-11-17 RX ADMIN — NYSTATIN 500000 UNITS: 100000 SUSPENSION ORAL at 23:11

## 2020-11-17 RX ADMIN — QUETIAPINE 25 MG: 25 TABLET ORAL at 20:43

## 2020-11-17 RX ADMIN — MIDAZOLAM (PF) 1 MG/ML IN 0.9 % SODIUM CHLORIDE INTRAVENOUS SOLUTION 6 MG/HR: at 06:20

## 2020-11-17 RX ADMIN — PROPOFOL 100 MG: 10 INJECTION, EMULSION INTRAVENOUS at 15:30

## 2020-11-17 RX ADMIN — CALCIUM GLUCONATE 3 G: 98 INJECTION, SOLUTION INTRAVENOUS at 09:17

## 2020-11-17 RX ADMIN — NYSTATIN 500000 UNITS: 100000 SUSPENSION ORAL at 19:02

## 2020-11-17 RX ADMIN — AMPICILLIN AND SULBACTAM 3 G: 2; 1 INJECTION, POWDER, FOR SOLUTION INTRAMUSCULAR; INTRAVENOUS at 04:34

## 2020-11-17 RX ADMIN — FUROSEMIDE 40 MG: 10 INJECTION, SOLUTION INTRAVENOUS at 15:59

## 2020-11-17 RX ADMIN — DEXMEDETOMIDINE 0.6 MCG/KG/HR: 100 INJECTION, SOLUTION, CONCENTRATE INTRAVENOUS at 23:11

## 2020-11-17 RX ADMIN — CARBOXYMETHYLCELLULOSE SODIUM 1 DROP: 5 SOLUTION/ DROPS OPHTHALMIC at 08:16

## 2020-11-17 RX ADMIN — CARBOXYMETHYLCELLULOSE SODIUM 1 DROP: 5 SOLUTION/ DROPS OPHTHALMIC at 13:26

## 2020-11-17 RX ADMIN — AMPICILLIN AND SULBACTAM 3 G: 2; 1 INJECTION, POWDER, FOR SOLUTION INTRAMUSCULAR; INTRAVENOUS at 10:22

## 2020-11-17 RX ADMIN — CARBOXYMETHYLCELLULOSE SODIUM 1 DROP: 5 SOLUTION/ DROPS OPHTHALMIC at 16:00

## 2020-11-17 RX ADMIN — ANTICOAGULANT CITRATE DEXTROSE SOLUTION FORMULA A 700 ML: 12.25; 11; 3.65 SOLUTION INTRAVENOUS at 09:18

## 2020-11-17 RX ADMIN — PHENYLEPHRINE HYDROCHLORIDE 200 MCG: 10 INJECTION INTRAVENOUS at 15:29

## 2020-11-17 RX ADMIN — SODIUM CHLORIDE 500 MG: 9 INJECTION, SOLUTION INTRAVENOUS at 06:17

## 2020-11-17 RX ADMIN — PROPOFOL 100 MG: 10 INJECTION, EMULSION INTRAVENOUS at 15:31

## 2020-11-17 RX ADMIN — CHLORHEXIDINE GLUCONATE 0.12% ORAL RINSE 15 ML: 1.2 LIQUID ORAL at 20:43

## 2020-11-17 RX ADMIN — HEPARIN SODIUM 5000 UNITS: 5000 INJECTION, SOLUTION INTRAVENOUS; SUBCUTANEOUS at 06:17

## 2020-11-17 RX ADMIN — CARBOXYMETHYLCELLULOSE SODIUM 1 DROP: 5 SOLUTION/ DROPS OPHTHALMIC at 06:17

## 2020-11-17 RX ADMIN — CARBOXYMETHYLCELLULOSE SODIUM 1 DROP: 5 SOLUTION/ DROPS OPHTHALMIC at 20:43

## 2020-11-17 RX ADMIN — PHENYLEPHRINE HYDROCHLORIDE 150 MCG: 10 INJECTION INTRAVENOUS at 15:31

## 2020-11-17 ASSESSMENT — ACTIVITIES OF DAILY LIVING (ADL)
ADLS_ACUITY_SCORE: 24

## 2020-11-17 ASSESSMENT — MIFFLIN-ST. JEOR
SCORE: 1702.25
SCORE: 1702.25

## 2020-11-17 NOTE — PROGRESS NOTES
" Renal Medicine Progress Note            Assessment/Plan:     # Myasthenia gravis: Newly diagnosed and presented with crisis.                -4/5 PLEX               -on prednisone  # Hypoxic respiratory failure: Aspiration PNA.                -Unasyn     # FEN: Up ~ 10 kg. Excellent urine output. Electrolytes are good.      # HTN: On Norvasc.      Plan:  # Last PLEX treatment on Thursday        Interval History:     Afebrile. BP is okay. O2 is low at 92 with 40% FIO2. Excellent urine output. Remains sedated and intubated.           Medications and Allergies:       amLODIPine  10 mg Per Feeding Tube Daily     ampicillin-sulbactam (UNASYN) IV  3 g Intravenous Q6H     artificial tears ophthalmic solution  1 drop Both Eyes Q2H While awake     chlorhexidine  15 mL Mouth/Throat Q12H     heparin ANTICOAGULANT  5,000 Units Subcutaneous Q8H     insulin aspart  1-6 Units Subcutaneous Q4H     pantoprazole (PROTONIX) IV  40 mg Intravenous Daily     QUEtiapine  25 mg Oral BID     sulfamethoxazole-trimethoprim  10 mL Per Feeding Tube Daily      No Known Allergies         Physical Exam:   Vitals were reviewed   , Blood pressure (!) 143/87, pulse 91, temperature 99  F (37.2  C), temperature source Axillary, resp. rate 15, height 1.73 m (5' 8.11\"), weight 91.1 kg (200 lb 13.4 oz), SpO2 92 %.    Wt Readings from Last 3 Encounters:   11/17/20 91.1 kg (200 lb 13.4 oz)       Intake/Output Summary (Last 24 hours) at 11/17/2020 1016  Last data filed at 11/17/2020 1000  Gross per 24 hour   Intake 3688.28 ml   Output 2185 ml   Net 1503.28 ml       GENERAL APPEARANCE: Critically ill.   HEENT:  Eyes close. Intubated.   RESP: Diminish BS. No wheezes.   CV: RRR, nl S1/S2, no m/r/g   ABDOMEN: o/s/nt/nd, bs present  EXTREMITIES/SKIN: no rashes/lesions on observed skin; + edema  NEURO: Sedated  RIJ temp CVC cdi         Data:     CBC RESULTS:     Recent Labs   Lab 11/17/20  0336 11/16/20  0430 11/15/20  0358 11/14/20  0402 11/13/20  0515 " 11/11/20  0635   WBC 13.4* 18.9* 18.4* 21.4* 7.0 12.6*   RBC 4.82 5.00 4.58 4.57 4.36* 4.44   HGB 14.4 15.2 13.8 14.0 13.1* 13.3   HCT 44.3 46.2 42.0 41.2 39.2* 41.2    180 178 219 179 199       Basic Metabolic Panel:  Recent Labs   Lab 11/17/20  0336 11/16/20  0430 11/15/20  0358 11/14/20  0402 11/13/20  0515 11/12/20  0525    141 147* 143 142 145*   POTASSIUM 4.2 4.2 4.1 3.9 3.8 3.4   CHLORIDE 108 106 115* 113* 112* 113*   CO2 33* 31 30 27 26 28   BUN 37* 31* 23 19 15 20   CR 0.60* 0.66 0.67 0.94 0.82 1.04   * 166* 156* 230* 202* 75   JESSICA 7.7* 7.8* 7.9* 7.9* 7.9* 7.9*       INR  Recent Labs   Lab 11/17/20  0400   INR 1.20*      Attestation:   I have reviewed today's relevant vital signs, notes, medications, labs and imaging.    Waldo Thakkar MD  [facility] - Nephrology  Office phone :[phone]  Pager: [phone]

## 2020-11-17 NOTE — PLAN OF CARE
No changes. Remains SR without ectopy. Vented on Versed gtt. Calm. Able to take off restraints. Purposeful and follows commands.

## 2020-11-17 NOTE — PROGRESS NOTES
Apheresis Treatment -     Treatment in room 346 Consent verified.  Treatment number: 4/5    Height: 173 cm  Weight: 91 kg  HCT: 44%  Inlet speed: 65-75  ACDA ratio: 10:1  Fluid balance: 100  Access: CVC  Post treatment line dwell: Heparin 1,000units/ml, red port 1400 units (1.4mls), blue port 1400 units (1.4 mls).  Replacement product: 5% albumin  Electrolyte replacement: Calcium gluconate 3grams in NS - total 80 mls, piggybacked into return lines, infused over time of treatment.  Premedications: none    Treatment Notes: Patient became tachycardic (110's) during the run and also was very restless.  They grabbed at their breathing tube.  The Primary nurse increased sedation to assist patient to relax. Patient's HR normalized and remained stable throughout the treatment.     Treatment completed as ordered, VSS, see EPIC flowsheet for run data.    Next treatment: 11/19/2020

## 2020-11-17 NOTE — PROGRESS NOTES
[facility] RESPIRATORY NOTE           Date of Admission: 11/10/2020     Date of Intubation (most recent): 11/10/20     Reason for Mechanical Ventilation: Resp Failure     Number of Days on Mechanical Ventilation: 8     Met Criteria for Spontaneous Breathing Trial: Yes - 10/10 40% for 90 minutes     Significant Events Today: none overnight.  NIF -5 and VC 320ml     ABG Results:         Recent Labs   Lab 11/14/20  1400 11/14/20  1235 11/11/20  0635   PH 7.34* 7.34*  --    PCO2 49* 48*  --    PO2 67* 46*  --    HCO3 26 26  --    O2PER 100% 70 60       Current Vent Settings: Ventilation Mode: CMV/AC  (Continuous Mandatory Ventilation/ Assist Control)  FiO2 (%): 40 %  Rate Set (breaths/minute): 16 breaths/min  Tidal Volume Set (mL): 450 mL  PEEP (cm H2O): 10 cmH2O  Pressure Support (cm H2O): 10 cmH2O  Oxygen Concentration (%): 40 %  Resp: 16        Plan: Continue vent support overnight, daily SBT, NIF and VC    Cl Means, RT

## 2020-11-17 NOTE — ANESTHESIA PROCEDURE NOTES
Airway   Date/Time: 11/17/2020 3:30 PM   Patient location during procedure: ICU    Staff -   CRNA: Anne Camacho APRN CRNA  Other Anesthesia Staff: Miri Stuart APRN CRNA  Performed By: CRNA    Consent for Airway   Urgency: emergentConsent: The procedure was performed in an emergent situation.    Report Obtained from Primary Care Team  History regarding most recent potassium obtained: Yes  History regarding presence/absence of renal failure obtained:Yes  History regarding stroke/CVA obtained:Yes  History regarding presence/absence of NM disorder:Yes    Indications and Patient Condition  Indications for airway management: airway protection and respiratory insufficiency  Mallampati: IInduction type:intravenousMask difficulty assessment: 0 - not attempted (Pt already on vent (on 100% o2). Need for tube exchange due to tube leak and aspiration of blood)    Final Airway Details  Final airway type: endotracheal airway  Successful airway:ETT - single and Single subglottic suction  Endotracheal Airway Details   ETT size (mm): 8.0  Successful intubation technique: video laryngoscopy  Grade View of Cords: 1  Adjucts: stylet  Measured from: lips  Secured at (cm): 23  Secured with: commercial tube palafox    Post intubation assessment   ETT secured, Vent settings by primary/ICU team, Sedation to be ordered by primary/ICU team, No apparent complications and Primary/ICU team to review CXR  Placement verified by: capnometry, equal breath sounds and chest rise   Number of attempts at approach: 1  Number of other approaches attempted: 0  Secured with:commercial tube palafox  Ease of procedure: easy  Dentition: Intact and Unchanged    Medications Administered  Propofol (DIPRIVAN) injection 10 mg/mL vial, 100 mg  Additional Comments  Called by ICU staff for tube exchange d/t tube leak and suspicion of aspiration of blood. Feeding tube off and turned to suction prior to sedation and tube exchange. Pt on 100% o2 via vent.  ETT left in place when introducing Glidescope blade for visualization. Oralpharynx suction of clotty/moucousy blood. Clear unobstructed view of cords. Old ETT removed. Blood noted and confirmed on end of ETT when removed for tube exchange. New ETT placed atraumatically and with ease. RT present during tube exchange, secured ETT, and managed vent. Confirmed BBS & Co2 color changer confirmation utilized. Notified Intensivist of finding blood on tip of ETT. ICU RN also aware. RT present during tube exchange, secured ETT, and managed vent.

## 2020-11-17 NOTE — PLAN OF CARE
Pt alert/drowsy and follows commands.  Pt denies pain.  Pt on versed 4 mg/hour, dilaudid 0.6 mg /hour.  PT up to chair x 2 hours and tolerated well.  Pt with small amt clear secretions from ETT.  Pt with good urine output.  PT rectal tube with large amt of leakage around tube, put 150 ml liquid stool out this shift.  Tube repositioned and currently no leakage.  PT moves all extremities.  Pt weaned x 2 hours on vent.  Peep decreased to 10 from 14, o2 to 50% from 70%.  Pt daughter at bedside and updated on plan of care and new visitor restrictions.  Pt awake most of day, currently resting.
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
## 2020-11-17 NOTE — PROGRESS NOTES
Bedside RN notified by writer of critical Fibrinogen, bedside RN  will call ordering nephrologist.

## 2020-11-17 NOTE — ANESTHESIA CARE TRANSFER NOTE
Patient: Roosevelt Burris    * No procedures listed *    Diagnosis: * No pre-op diagnosis entered *  Diagnosis Additional Information: No value filed.    Anesthesia Type:   No value filed.     Note:  Airway :ETT  Patient transferred to:ICU  Comments: See ETT procedure note for sequence of eventsICU Handoff: Call for PAUSE to initiate/utilize ICU HANDOFF, Identified Patient, Identified Responsible Provider, Reviewed the Pertinent Medical History, Discussed Surgical Course, Reviewed Intra-OP Anesthesia Management and Issues during Anesthesia, Set Expectations for Post Procedure Period and Allowed Opportunity for Questions and Acknowledgement of Understanding      Vitals: (Last set prior to Anesthesia Care Transfer)              Electronically Signed By: TAY Stephenson CRNA  November 17, 2020  4:09 PM

## 2020-11-17 NOTE — PROGRESS NOTES
"  [facility]    Stroke/Neurocritical Care Progress Note    Interval Events  PLEX #4 completed today, tolerated well, bit tongue, needing higher dose of dilaudid overnight.    Impression  1. 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    2. Respiratory paralysis- secondary to #1, following daily inspiratory forces/tidal volumes in response to therapy    3. Pain/sedation- to tolerate intubation    4. Lung consolidation- consider aspiration secondary to bulbar weakness/dysphagia and low lung volumes, management per Intensivist    Plan  Continue plasma exchange every other day, plan for 5 sessions total (3/5, through 11/19/2020)  Methylprednisolone 500 mg IV q12 x10 doses completed  Start prednisone 60 mg daily per tube, plan to continue ongoing  Daily MIP and tidal volume per RT (trend documented in flowsheets, and in physical exam of this note)  Will start pyridostigmine prior to extubation readiness  Can follow up with Dr. Greenwood of [facility] post-hospital for ongoing management  Remainder of cares per Intensivist    The Stroke/Neurocritical Care Staff is Dr. Montalvo.     iVcki Burns MD  Vascular Neurology Fellow  To page me or covering stroke neurology team member, click here: AMCOM   Choose \"On Call\" tab at top, then search dropdown box for \"Neurology Adult\", select location, press Enter, then look for stroke/neuro ICU/telestroke.    ______________________________________________________    Medications   Home Meds  Prior to Admission medications    Medication Sig Start Date End Date Taking? Authorizing Provider   amoxicillin-clavulanate (AUGMENTIN) 875-125 MG tablet Take 1 tablet by mouth 2 times daily X 10 days (started 11/9/2020)   Yes Unknown, Entered " By History   HYDROcodone-acetaminophen (NORCO) 5-325 MG tablet Take 1-2 tablets by mouth 2 times daily as needed for severe pain    Yes Unknown, Entered By History   predniSONE (DELTASONE) 20 MG tablet Take 20 mg by mouth 2 times daily X 5 days (started 11/9/2020)   Yes Unknown, Entered By History   tamsulosin (FLOMAX) 0.4 MG capsule Take 0.4 mg by mouth daily   Yes Unknown, Entered By History       Scheduled Meds    amLODIPine  10 mg Per Feeding Tube Daily     ampicillin-sulbactam (UNASYN) IV  3 g Intravenous Q6H     artificial tears ophthalmic solution  1 drop Both Eyes Q2H While awake     chlorhexidine  15 mL Mouth/Throat Q12H     heparin ANTICOAGULANT  5,000 Units Subcutaneous Q8H     insulin aspart  1-6 Units Subcutaneous Q4H     nystatin  500,000 Units Mouth/Throat 4x Daily     pantoprazole (PROTONIX) IV  40 mg Intravenous Daily     predniSONE  60 mg Oral Daily     QUEtiapine  25 mg Oral BID     sulfamethoxazole-trimethoprim  10 mL Per Feeding Tube Daily       Infusion Meds    fentaNYL Stopped (11/15/20 1028)     HYDROmorphone 0.6 mg/hr (11/17/20 0739)     midazolam 6 mg/hr (11/17/20 0901)     sodium chloride 20 mL/hr at 11/17/20 0740       PRN Meds  sodium chloride 0.9%, sodium chloride 0.9%, acetaminophen **OR** acetaminophen, albuterol, calcium gluconate, calcium gluconate, calcium gluconate, calcium gluconate, glucose **OR** dextrose **OR** glucagon, diphenhydrAMINE, diphenhydrAMINE, fentaNYL, heparin Lock (1000 units/mL High concentration), heparin Lock (1000 units/mL High concentration), hydromorphone, midazolam, naloxone, ondansetron **OR** ondansetron, senna-docusate **OR** senna-docusate       PHYSICAL EXAMINATION  Temp:  [97.7  F (36.5  C)-99  F (37.2  C)] 98.9  F (37.2  C)  Pulse:  [] 100  Resp:  [12-27] 21  BP: (123-181)/() 147/100  FiO2 (%):  [40 %-50 %] 40 %  SpO2:  [90 %-99 %] 93 %     Date 11/13 11/14 11/15 11/16 11/17   [facility]/NIF -7 -4 -3 -5 -1   Vital Capacity 290 180 [phone]        General:  patient lying in bed without any acute distress    HEENT:  normocephalic/atraumatic, intubated, right tongue laceration with dried and fresh blood in mouth, edentulous  Cardio:  RRR  Pulmonary:  on mechanical ventilation  Abdomen:  soft, non-tender, non-distended  Extremities:  no edema, peripheral pulses palpable  Skin:  intact, warm/dry      Neurologic  Mental Status:  examined on dilaudid/versed. Awake, alert and attentive. Follows complex commands, shakes/nods/thumbs up/down appropriately.  Cranial Nerves:  VFF, PERRL, ptosis present again, conjugate rest gaze, extraocular movements improved to L, up, and down, still largely restricted to R, lower facial musculature obscured by ETT, but now able to fully purse lips around tube,  tongue protrudes, obscured by ETT  Motor: movements are more brisk today, elbow flexion 4/5, elbow extension 4-/5, finger flexion 5/5, finger extension 4/5. Hip flexion 4/5, knee flexion 4+/5, knee extension 4+/5, ankle dorsiflexion 5/5, ankle plantarflexion 5/5. No fasciculations, twitches, or abnormal movements.  Reflexes:  2+ intact and symmetric reflexes in bilateral biceps, brachioradialis, patellae, achilles, toes downgoing  Sensory:  light touch sensation intact and symmetric throughout upper and lower extremities  Coordination:  not tested  Station/Gait:  not tested    Imaging  I personally reviewed all imaging; relevant findings per HPI.     Lab Results Data   CBC  Recent Labs   Lab 11/17/20  0336 11/16/20  0430 11/15/20  0358   WBC 13.4* 18.9* 18.4*   RBC 4.82 5.00 4.58   HGB 14.4 15.2 13.8   HCT 44.3 46.2 42.0    180 178     Basic Metabolic Panel    Recent Labs   Lab 11/17/20  0336 11/16/20  0430 11/15/20  0358    141 147*   POTASSIUM 4.2 4.2 4.1   CHLORIDE 108 106 115*   CO2 33* 31 30   BUN 37* 31* 23   CR 0.60* 0.66 0.67   * 166* 156*   JESSICA 7.7* 7.8* 7.9*     Liver Panel  Recent Labs   Lab 11/15/20  0358 11/11/20  0020   PROTTOTAL 4.9*  5.2*   ALBUMIN 3.3* 2.7*   BILITOTAL 0.2 0.3   ALKPHOS 37* 55   AST 16 29   ALT 34 44     INR    Recent Labs   Lab Test 11/17/20  0400   INR 1.20*      Lipid Profile  No lab results found.  A1C    Recent Labs   Lab Test 11/14/20  0402   A1C 6.0*     Troponin I  No results for input(s): TROPI in the last 168 hours.

## 2020-11-17 NOTE — PROGRESS NOTES
[facility] ICU RESPIRATORY NOTE        Date of Admission: 11/10/2020    Date of Intubation (most recent): 11/10/2020    Reason for Mechanical Ventilation: Respiratory Failure    Number of Days on Mechanical Ventilation: 8    Met Criteria for Spontaneous Breathing Trial: Yes 1/5 hours 5/5 per MD.    Significant Events Today: None    ABG Results:   Recent Labs   Lab 11/14/20  1400 11/14/20  1235 11/11/20  0635   PH 7.34* 7.34*  --    PCO2 49* 48*  --    PO2 67* 46*  --    HCO3 26 26  --    O2PER 100% 70 60       Current Vent Settings: Ventilation Mode: CPAP/PS  (Continuous positive airway pressure with Pressure Support)  FiO2 (%): 40 %  Rate Set (breaths/minute): 16 breaths/min  Tidal Volume Set (mL): 450 mL  PEEP (cm H2O): 5 cmH2O  Pressure Support (cm H2O): (S) 10 cmH2O  Oxygen Concentration (%): (S) 100 %  Resp: 16      Plan: Continue full support and wean as tolerated.    Abhinav Doss, RT

## 2020-11-17 NOTE — PROGRESS NOTES
Procedure: PLEX  Indication: MG crisis  Access: R temp internal jugular  Plasma volume: 1:1 ~ 3000 ml of albumin   +3 grams calcium gluconate    No issues with procedure so far.   5/5 PLEX tx on Thursday

## 2020-11-18 ENCOUNTER — APPOINTMENT (OUTPATIENT)
Dept: GENERAL RADIOLOGY | Facility: CLINIC | Age: 60
DRG: 003 | End: 2020-11-18
Attending: INTERNAL MEDICINE
Payer: COMMERCIAL

## 2020-11-18 LAB
ANION GAP SERPL CALCULATED.3IONS-SCNC: 4 MMOL/L (ref 3–14)
ANION GAP SERPL CALCULATED.3IONS-SCNC: 5 MMOL/L (ref 3–14)
BASE EXCESS BLDA CALC-SCNC: 8.9 MMOL/L
BASE EXCESS BLDA CALC-SCNC: 9.5 MMOL/L
BUN SERPL-MCNC: 53 MG/DL (ref 7–30)
BUN SERPL-MCNC: 53 MG/DL (ref 7–30)
CALCIUM SERPL-MCNC: 7.6 MG/DL (ref 8.5–10.1)
CALCIUM SERPL-MCNC: 7.6 MG/DL (ref 8.5–10.1)
CHLORIDE SERPL-SCNC: 104 MMOL/L (ref 94–109)
CHLORIDE SERPL-SCNC: 108 MMOL/L (ref 94–109)
CO2 SERPL-SCNC: 32 MMOL/L (ref 20–32)
CO2 SERPL-SCNC: 34 MMOL/L (ref 20–32)
CREAT SERPL-MCNC: 0.68 MG/DL (ref 0.66–1.25)
CREAT SERPL-MCNC: 0.74 MG/DL (ref 0.66–1.25)
GFR SERPL CREATININE-BSD FRML MDRD: >90 ML/MIN/{1.73_M2}
GFR SERPL CREATININE-BSD FRML MDRD: >90 ML/MIN/{1.73_M2}
GLUCOSE BLDC GLUCOMTR-MCNC: 155 MG/DL (ref 70–99)
GLUCOSE BLDC GLUCOMTR-MCNC: 176 MG/DL (ref 70–99)
GLUCOSE BLDC GLUCOMTR-MCNC: 216 MG/DL (ref 70–99)
GLUCOSE BLDC GLUCOMTR-MCNC: 259 MG/DL (ref 70–99)
GLUCOSE BLDC GLUCOMTR-MCNC: 260 MG/DL (ref 70–99)
GLUCOSE SERPL-MCNC: 176 MG/DL (ref 70–99)
GLUCOSE SERPL-MCNC: 274 MG/DL (ref 70–99)
HCO3 BLD-SCNC: 34 MMOL/L (ref 21–28)
HCO3 BLD-SCNC: 35 MMOL/L (ref 21–28)
INTERPRETATION ECG - MUSE: NORMAL
O2/TOTAL GAS SETTING VFR VENT: ABNORMAL %
OXYHGB MFR BLD: 92 % (ref 92–100)
OXYHGB MFR BLD: 99 % (ref 92–100)
PCO2 BLD: 49 MM HG (ref 35–45)
PCO2 BLD: 51 MM HG (ref 35–45)
PH BLD: 7.45 PH (ref 7.35–7.45)
PH BLD: 7.46 PH (ref 7.35–7.45)
PLATELET # BLD AUTO: 135 10E9/L (ref 150–450)
PO2 BLD: 143 MM HG (ref 80–105)
PO2 BLD: 60 MM HG (ref 80–105)
POTASSIUM SERPL-SCNC: 3.7 MMOL/L (ref 3.4–5.3)
POTASSIUM SERPL-SCNC: 4 MMOL/L (ref 3.4–5.3)
SODIUM SERPL-SCNC: 143 MMOL/L (ref 133–144)
SODIUM SERPL-SCNC: 144 MMOL/L (ref 133–144)
TROPONIN I SERPL-MCNC: 0.03 UG/L (ref 0–0.04)

## 2020-11-18 PROCEDURE — 80048 BASIC METABOLIC PNL TOTAL CA: CPT | Performed by: INTERNAL MEDICINE

## 2020-11-18 PROCEDURE — 200N000001 HC R&B ICU

## 2020-11-18 PROCEDURE — 94003 VENT MGMT INPAT SUBQ DAY: CPT

## 2020-11-18 PROCEDURE — 250N000011 HC RX IP 250 OP 636: Performed by: INTERNAL MEDICINE

## 2020-11-18 PROCEDURE — 82805 BLOOD GASES W/O2 SATURATION: CPT | Performed by: INTERNAL MEDICINE

## 2020-11-18 PROCEDURE — 84484 ASSAY OF TROPONIN QUANT: CPT | Performed by: SURGERY

## 2020-11-18 PROCEDURE — 71045 X-RAY EXAM CHEST 1 VIEW: CPT

## 2020-11-18 PROCEDURE — 999N000157 HC STATISTIC RCP TIME EA 10 MIN

## 2020-11-18 PROCEDURE — 250N000013 HC RX MED GY IP 250 OP 250 PS 637: Performed by: INTERNAL MEDICINE

## 2020-11-18 PROCEDURE — 93005 ELECTROCARDIOGRAM TRACING: CPT

## 2020-11-18 PROCEDURE — 999N001017 HC STATISTIC GLUCOSE BY METER IP

## 2020-11-18 PROCEDURE — 36600 WITHDRAWAL OF ARTERIAL BLOOD: CPT

## 2020-11-18 PROCEDURE — 93010 ELECTROCARDIOGRAM REPORT: CPT | Performed by: INTERNAL MEDICINE

## 2020-11-18 PROCEDURE — 250N000012 HC RX MED GY IP 250 OP 636 PS 637: Performed by: STUDENT IN AN ORGANIZED HEALTH CARE EDUCATION/TRAINING PROGRAM

## 2020-11-18 PROCEDURE — C9113 INJ PANTOPRAZOLE SODIUM, VIA: HCPCS | Performed by: INTERNAL MEDICINE

## 2020-11-18 PROCEDURE — 99291 CRITICAL CARE FIRST HOUR: CPT | Mod: GC | Performed by: INTERNAL MEDICINE

## 2020-11-18 PROCEDURE — 250N000009 HC RX 250: Performed by: INTERNAL MEDICINE

## 2020-11-18 PROCEDURE — 99291 CRITICAL CARE FIRST HOUR: CPT | Performed by: PSYCHIATRY & NEUROLOGY

## 2020-11-18 PROCEDURE — 85049 AUTOMATED PLATELET COUNT: CPT | Performed by: INTERNAL MEDICINE

## 2020-11-18 PROCEDURE — 80048 BASIC METABOLIC PNL TOTAL CA: CPT | Performed by: SURGERY

## 2020-11-18 PROCEDURE — 999N000009 HC STATISTIC AIRWAY CARE

## 2020-11-18 PROCEDURE — 258N000003 HC RX IP 258 OP 636: Performed by: INTERNAL MEDICINE

## 2020-11-18 PROCEDURE — 250N000009 HC RX 250: Performed by: ANESTHESIOLOGY

## 2020-11-18 PROCEDURE — 272N000078 HC NUTRITION PRODUCT INTERMEDIATE LITER

## 2020-11-18 RX ORDER — METOPROLOL TARTRATE 1 MG/ML
5 INJECTION, SOLUTION INTRAVENOUS ONCE
Status: COMPLETED | OUTPATIENT
Start: 2020-11-18 | End: 2020-11-18

## 2020-11-18 RX ORDER — FUROSEMIDE 10 MG/ML
80 INJECTION INTRAMUSCULAR; INTRAVENOUS ONCE
Status: COMPLETED | OUTPATIENT
Start: 2020-11-18 | End: 2020-11-18

## 2020-11-18 RX ADMIN — QUETIAPINE 25 MG: 25 TABLET ORAL at 08:56

## 2020-11-18 RX ADMIN — METOPROLOL TARTRATE 5 MG: 5 INJECTION INTRAVENOUS at 17:25

## 2020-11-18 RX ADMIN — CARBOXYMETHYLCELLULOSE SODIUM 1 DROP: 5 SOLUTION/ DROPS OPHTHALMIC at 22:57

## 2020-11-18 RX ADMIN — NYSTATIN 500000 UNITS: 100000 SUSPENSION ORAL at 18:03

## 2020-11-18 RX ADMIN — QUETIAPINE 25 MG: 25 TABLET ORAL at 22:57

## 2020-11-18 RX ADMIN — MIDAZOLAM (PF) 1 MG/ML IN 0.9 % SODIUM CHLORIDE INTRAVENOUS SOLUTION 3 MG/HR: at 02:06

## 2020-11-18 RX ADMIN — AMPICILLIN AND SULBACTAM 3 G: 2; 1 INJECTION, POWDER, FOR SOLUTION INTRAMUSCULAR; INTRAVENOUS at 04:21

## 2020-11-18 RX ADMIN — PREDNISONE 60 MG: 20 TABLET ORAL at 08:56

## 2020-11-18 RX ADMIN — CARBOXYMETHYLCELLULOSE SODIUM 1 DROP: 5 SOLUTION/ DROPS OPHTHALMIC at 18:03

## 2020-11-18 RX ADMIN — MIDAZOLAM (PF) 1 MG/ML IN 0.9 % SODIUM CHLORIDE INTRAVENOUS SOLUTION 6 MG/HR: at 17:50

## 2020-11-18 RX ADMIN — NYSTATIN 500000 UNITS: 100000 SUSPENSION ORAL at 08:57

## 2020-11-18 RX ADMIN — CARBOXYMETHYLCELLULOSE SODIUM 1 DROP: 5 SOLUTION/ DROPS OPHTHALMIC at 16:59

## 2020-11-18 RX ADMIN — Medication 0.3 MG/HR: at 02:06

## 2020-11-18 RX ADMIN — SULFAMETHOXAZOLE AND TRIMETHOPRIM 80 MG: 200; 40 SUSPENSION ORAL at 09:23

## 2020-11-18 RX ADMIN — NYSTATIN 500000 UNITS: 100000 SUSPENSION ORAL at 22:57

## 2020-11-18 RX ADMIN — DEXMEDETOMIDINE 0.7 MCG/KG/HR: 100 INJECTION, SOLUTION, CONCENTRATE INTRAVENOUS at 07:02

## 2020-11-18 RX ADMIN — CARBOXYMETHYLCELLULOSE SODIUM 1 DROP: 5 SOLUTION/ DROPS OPHTHALMIC at 05:35

## 2020-11-18 RX ADMIN — AMPICILLIN AND SULBACTAM 3 G: 2; 1 INJECTION, POWDER, FOR SOLUTION INTRAMUSCULAR; INTRAVENOUS at 09:36

## 2020-11-18 RX ADMIN — FUROSEMIDE 80 MG: 10 INJECTION, SOLUTION INTRAVENOUS at 10:36

## 2020-11-18 RX ADMIN — CHLORHEXIDINE GLUCONATE 0.12% ORAL RINSE 15 ML: 1.2 LIQUID ORAL at 07:33

## 2020-11-18 RX ADMIN — CARBOXYMETHYLCELLULOSE SODIUM 1 DROP: 5 SOLUTION/ DROPS OPHTHALMIC at 09:37

## 2020-11-18 RX ADMIN — CARBOXYMETHYLCELLULOSE SODIUM 1 DROP: 5 SOLUTION/ DROPS OPHTHALMIC at 14:13

## 2020-11-18 RX ADMIN — AMLODIPINE BESYLATE 10 MG: 10 TABLET ORAL at 08:56

## 2020-11-18 RX ADMIN — NYSTATIN 500000 UNITS: 100000 SUSPENSION ORAL at 14:13

## 2020-11-18 RX ADMIN — PANTOPRAZOLE SODIUM 40 MG: 40 INJECTION, POWDER, FOR SOLUTION INTRAVENOUS at 08:56

## 2020-11-18 RX ADMIN — CARBOXYMETHYLCELLULOSE SODIUM 1 DROP: 5 SOLUTION/ DROPS OPHTHALMIC at 07:33

## 2020-11-18 RX ADMIN — CHLORHEXIDINE GLUCONATE 0.12% ORAL RINSE 15 ML: 1.2 LIQUID ORAL at 22:57

## 2020-11-18 ASSESSMENT — MIFFLIN-ST. JEOR: SCORE: 1725.25

## 2020-11-18 ASSESSMENT — ACTIVITIES OF DAILY LIVING (ADL)
ADLS_ACUITY_SCORE: 26

## 2020-11-18 NOTE — PROGRESS NOTES
[facility] ICU RESPIRATORY NOTE           Date of Admission: 11/10/2020     Date of Intubation (most recent): 11/10/2020     Reason for Mechanical Ventilation: Respiratory Failure     Number of Days on Mechanical Ventilation: 9     Met Criteria for Spontaneous Breathing Trial: Yes   Reason for No Pressure Support Trial: per MD    Ventilation Mode: CMV/AC  (Continuous Mandatory Ventilation/ Assist Control)  FiO2 (%): 100 %  Rate Set (breaths/minute): 16 breaths/min  Tidal Volume Set (mL): 450 mL  PEEP (cm H2O): 15 cmH2O  Pressure Support (cm H2O): (S) 10 cmH2O  Oxygen Concentration (%): 80 %  Resp: 17    Recent Labs   Lab 11/17/20  1619 11/17/20  1330 11/14/20  1400 11/14/20  1235   PH 7.32* 7.31* 7.34* 7.34*   PCO2 63* 65* 49* 48*   PO2 82 74* 67* 46*   HCO3 33* 33* 26 26   O2PER 100% 100% 100% 70       Significant Events Tonight : none during this shift      Plan:  Continue full vent support tonight , assess for weaning readiness daily

## 2020-11-18 NOTE — PROGRESS NOTES
"  [facility]    Stroke/Neurocritical Care Progress Note    Interval Events  More somnolent today related to analgesia/sedation med adjustments but exam is stable otherwise    Impression  1. 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 Encouraging response to therapy with regard to best bulbar and limb exams over the past few days, but delayed respiratory status response may force the need for trach/PEG    2. Respiratory paralysis- secondary to #1, following daily inspiratory forces/tidal volumes in response to therapy    3. Pain/sedation- to tolerate intubation    4. Lung consolidation- consider aspiration secondary to bulbar weakness/dysphagia and low lung volumes, management per Intensivist    Plan  Continue plasma exchange every other day, plan for 5 sessions total (4/5, through 11/19/2020)  Methylprednisolone 500 mg IV q12 x10 doses completed  Continue prednisone 60 mg daily per tube, plan to continue ongoing  Daily MIP and tidal volume per RT (trend documented in flowsheets, and in physical exam of this note)  Will start pyridostigmine prior to extubation readiness  Can follow up with Dr. Greenwood of [facility] post-hospital for ongoing management  Remainder of cares per Intensivist    The Stroke/Neurocritical Care Staff is Dr. Delaney.     Vicki Burns MD  Vascular Neurology Fellow  To page me or covering stroke neurology team member, click here: AMCOM   Choose \"On Call\" tab at top, then search dropdown box for \"Neurology Adult\", select location, press Enter, then look for stroke/neuro ICU/telestroke.    ______________________________________________________    Medications   Home Meds  Prior to Admission medications    Medication Sig Start Date End Date " Taking? Authorizing Provider   amoxicillin-clavulanate (AUGMENTIN) 875-125 MG tablet Take 1 tablet by mouth 2 times daily X 10 days (started 11/9/2020)   Yes Unknown, Entered By History   HYDROcodone-acetaminophen (NORCO) 5-325 MG tablet Take 1-2 tablets by mouth 2 times daily as needed for severe pain    Yes Unknown, Entered By History   predniSONE (DELTASONE) 20 MG tablet Take 20 mg by mouth 2 times daily X 5 days (started 11/9/2020)   Yes Unknown, Entered By History   tamsulosin (FLOMAX) 0.4 MG capsule Take 0.4 mg by mouth daily   Yes Unknown, Entered By History       Scheduled Meds    amLODIPine  10 mg Per Feeding Tube Daily     artificial tears ophthalmic solution  1 drop Both Eyes Q2H While awake     chlorhexidine  15 mL Mouth/Throat Q12H     insulin aspart  1-6 Units Subcutaneous Q4H     nystatin  500,000 Units Mouth/Throat 4x Daily     pantoprazole (PROTONIX) IV  40 mg Intravenous Daily     predniSONE  60 mg Oral Daily     QUEtiapine  25 mg Oral BID     sulfamethoxazole-trimethoprim  10 mL Per Feeding Tube Daily       Infusion Meds    HYDROmorphone 0.4 mg/hr (11/18/20 0719)     midazolam 6 mg/hr (11/18/20 0719)     sodium chloride 20 mL/hr at 11/17/20 0740       PRN Meds  sodium chloride 0.9%, sodium chloride 0.9%, acetaminophen **OR** acetaminophen, albuterol, calcium gluconate, calcium gluconate, calcium gluconate, calcium gluconate, glucose **OR** dextrose **OR** glucagon, diphenhydrAMINE, diphenhydrAMINE, fentaNYL, heparin Lock (1000 units/mL High concentration), heparin Lock (1000 units/mL High concentration), hydromorphone, midazolam, naloxone, ondansetron **OR** ondansetron, senna-docusate **OR** senna-docusate       PHYSICAL EXAMINATION  Temp:  [97  F (36.1  C)-98.4  F (36.9  C)] 97  F (36.1  C)  Pulse:  [] 120  Resp:  [11-31] 21  BP: ()/() 130/88  FiO2 (%):  [80 %-100 %] 80 %  SpO2:  [91 %-100 %] 98 %     Date 11/13 11/14 11/15 11/16 11/17 11/18   MIP/NIF -7 -4 -3 -5 -1    Vital  Capacity 290 180 240 320 500        General:  patient lying in bed without any acute distress    HEENT:  normocephalic/atraumatic, intubated, right tongue laceration with dried and fresh blood in mouth, edentulous  Cardio:  RRR  Pulmonary:  on mechanical ventilation  Abdomen:  soft, non-tender, non-distended  Extremities:  no edema, peripheral pulses palpable  Skin:  intact, warm/dry      Neurologic  Mental Status:  examined on dilaudid/versed. Awake, alert and attentive. Follows complex commands, shakes/nods/thumbs up/down appropriately.  Cranial Nerves:  VFF, PERRL, ptosis present again, conjugate rest gaze, extraocular movements improved to L, up, and down, still largely restricted to R, lower facial musculature obscured by ETT, but now able to fully purse lips around tube,  tongue protrudes, obscured by ETT  Motor: movements are more brisk today, elbow flexion 4/5, elbow extension 4-/5, finger flexion 5/5, finger extension 4/5. Hip flexion 4/5, knee flexion 4+/5, knee extension 4+/5, ankle dorsiflexion 5/5, ankle plantarflexion 5/5. No fasciculations, twitches, or abnormal movements.  Reflexes:  2+ intact and symmetric reflexes in bilateral biceps, brachioradialis, patellae, achilles, toes downgoing  Sensory:  light touch sensation intact and symmetric throughout upper and lower extremities  Coordination:  not tested  Station/Gait:  not tested    Imaging  I personally reviewed all imaging; relevant findings per HPI.     Lab Results Data   CBC  Recent Labs   Lab 11/18/20  0532 11/17/20  0336 11/16/20  0430 11/15/20  0358   WBC  --  13.4* 18.9* 18.4*   RBC  --  4.82 5.00 4.58   HGB  --  14.4 15.2 13.8   HCT  --  44.3 46.2 42.0   * 171 180 178     Basic Metabolic Panel    Recent Labs   Lab 11/18/20  1400 11/18/20  0800 11/17/20  0336    144 143   POTASSIUM 3.7 4.0 4.2   CHLORIDE 104 108 108   CO2 34* 32 33*   BUN 53* 53* 37*   CR 0.74 0.68 0.60*   * 176* 226*   JESSICA 7.6* 7.6* 7.7*     Liver  Panel  Recent Labs   Lab 11/15/20  0358   PROTTOTAL 4.9*   ALBUMIN 3.3*   BILITOTAL 0.2   ALKPHOS 37*   AST 16   ALT 34     INR    Recent Labs   Lab Test 11/17/20  0400   INR 1.20*      Lipid Profile  No lab results found.  A1C    Recent Labs   Lab Test 11/14/20  0402   A1C 6.0*     Troponin I    Recent Labs   Lab 11/18/20  0800   TROPI 0.030

## 2020-11-18 NOTE — CONSULTS
Care Management Initial Consult    General Information  Assessment completed with: Piper sHu  Type of CM/SW Visit: Initial Assessment  Primary Care Provider verified and updated as needed: No   Readmission within the last 30 days:        Reason for Consult: other (see comments)  Advance Care Planning:     Daughter reports pt's sister, Sherie, is his legal guardian. No paperwork regarding this in on file.       Communication Assessment  Patient's communication style:   Unknown            Cognitive  Cognitive/Neuro/Behavioral: .WDL except  Level of Consciousness: sedated  Arousal Level: arouses to voice  Orientation: other (see comments)(HEYDI)  Mood/Behavior: calm;cooperative  Best Language: (heydi)  Speech: endotracheal tube    Living Environment:   People in home:     Resides in an Assisted Living in [place], [place]  Current living Arrangements:     [facility]   Able to return to prior arrangements:  Unknown at this time       Family/Social Support:  Care provided by:    Provides care for:    Marital Status:   Children;Sibling(s)          Description of Support System: Supportive;Involved    Support Assessment: Adequate family and caregiver support;Adequate social supports    Current Resources:   Skilled Home Care Services:  N/A-Dtr reports pt had been fairly independent prior to admission  Community Resources:    Equipment currently used at home:    Supplies currently used at home:      Employment/Financial:  Employment Status:     Disability     Financial Concerns:  Pt has [facility] and [employer] (Medicaid)           Lifestyle & Psychosocial Needs:   Dtr reports pt had been independent w/most needs; Sustained a TBI 23 years ago. Had lived w/spouse until they legally . Pt moved in w/his mother who passed away 4 years ago. Pt has been at [facility] in [place], [place] ([place]) since that time.             Functional Status:  Prior to admission patient needed assistance:    Pt was set to meet with [facility] RN to begin  an assessment to possibly increase some cares. When staff RN arrived, pt was found down in his [facility] apartment          Mental Health Status:      Unknown; Hx of TBI    Chemical Dependency Status:      Unknown          Values/Beliefs:  Spiritual, Cultural Beliefs, Restoration Practices, Values that affect care:     Unknown            Additional Information:      SW reviewed chart and spoke w/dtr, Piper, who reported pt's sister, Sherie, states she is pt's legal guardian. SW will need to call sister to obtain copy of paperwork to have it reviewed with [employer]. Piper reports that both she and Sherie have been talking and making choices for pt together. Dtr is aware if guardianship papers are provided and approved, naming pt's sister Sherie, that his sister will be the primary decision maker going forward.   Dtr stated pt sustained a TBI 23 years ago and has since been  from his wife. Pt has 2 daughters. One is [place], the other reportedly lives in [place].   SW discussed that once pt is extubated, he may be seen by various therapies who will evaluate/recommend a discharge plan. Dtr appeared to understand and stated the goal would be for pt to eventually return to retirement at some point. SW will continue to follow and assist as needed.       Jennifer Hurd, [facility]  [phone]
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
## 2020-11-18 NOTE — PROGRESS NOTES
Care Management Follow Up    Length of Stay (days): 8    Expected Discharge Date:       Concerns to be Addressed:       Patient plan of care discussed at interdisciplinary rounds: Yes    Anticipated Discharge Disposition:  Unknown     Anticipated Discharge Services:    Anticipated Discharge DME:      Patient/family educated on Medicare website which has current facility and service quality ratings:    Education Provided on the Discharge Plan:    Patient/Family in Agreement with the Plan:      Referrals Placed by CM/SW:    Private pay costs discussed: Not at this time    Additional Information:    SW attempted to reach pt's daughter, Piper, to complete initial assessment via phone. No answer. Left VM asking for call back.       JULITO Boyle   [facility]  [phone]

## 2020-11-18 NOTE — PROGRESS NOTES
Date of Admission: 11/10/2020     Date of Intubation (most recent): 11/10/2020     Reason for Mechanical Ventilation: Respiratory Failure     Number of Days on Mechanical Ventilation: 8     Met Criteria for Spontaneous Breathing Trial: Yes 1/5 hours 5/5 per MD.     Significant Events Today: ETT was changed for air leak,   Recent Labs   Lab 11/17/20  1619 11/17/20  1330 11/14/20  1400 11/14/20  1235   PH 7.32* 7.31* 7.34* 7.34*   PCO2 63* 65* 49* 48*   PO2 82 74* 67* 46*   HCO3 33* 33* 26 26   O2PER 100% 100% 100% 70     Ventilation Mode: CMV/AC  (Continuous Mandatory Ventilation/ Assist Control)  FiO2 (%): 40 %  Rate Set (breaths/minute): 16 breaths/min  Tidal Volume Set (mL): 450 mL  PEEP (cm H2O): 15 cmH2O  Pressure Support (cm H2O): (S) 10 cmH2O  Oxygen Concentration (%): 100 %  Resp: 17    Plan; continue ventilatory support SBT's daily

## 2020-11-18 NOTE — PLAN OF CARE
Neuro: sedated on versed and dilaudid, added dex in afternoon to allow weaning on versed. Follows commands, JIN, PERRL.  CV: HTN intermittently. SR/ST.  Pulm: PS for 1 hr 5/5 today, pt became tired and sats in high 80s near end. Bleeding from tongue and blood seeping around ETT cuff, so anesthesia exchanged the ETT out and problem resolved. Bleeding has slowed way down. Wound on tongue reopens during oral care if not careful. FiO2 increased to 100% and PEEP increased to 14 from 10 - unknown etiology but pt had done this past Saturday. CXR and ABGs also drawn.   GI/: Flexiseal in place, 400 out, occasional leakage around tube balloon. TF at goal. Weston in with good UOP. Lasix given x1  Skin: Intact, Blanchable redness on bottom.

## 2020-11-18 NOTE — PROGRESS NOTES
" Renal Medicine Progress Note            Assessment/Plan:     # Myasthenia gravis: Newly diagnosed and presented with crisis.                -4/5 PLEX               -on prednisone  # Hypoxic respiratory failure: Aspiration PNA.                -Unasyn     # FEN: Wt is significant up compare to admission weight.      # HTN: On Norvasc.      Plan:  # Intermittent diuretic as needed per ICU team  # 5/5 plasmapheresis treatment tomorrow. Neurology to let us know if patient would need more treatments           Interval History:     Afebrile. Tachy. Pt remains intubated. Per RN, patient respond to commands. Per RT, patient continues to be hypoxic.           Medications and Allergies:       amLODIPine  10 mg Per Feeding Tube Daily     artificial tears ophthalmic solution  1 drop Both Eyes Q2H While awake     chlorhexidine  15 mL Mouth/Throat Q12H     insulin aspart  1-6 Units Subcutaneous Q4H     nystatin  500,000 Units Mouth/Throat 4x Daily     pantoprazole (PROTONIX) IV  40 mg Intravenous Daily     predniSONE  60 mg Oral Daily     QUEtiapine  25 mg Oral BID     sulfamethoxazole-trimethoprim  10 mL Per Feeding Tube Daily        Allergies   Allergen Reactions     Dexmedetomidine      Bradycardia even at very low dose              Physical Exam:   Vitals were reviewed   , Blood pressure (!) 136/101, pulse 124, temperature 98.4  F (36.9  C), temperature source Axillary, resp. rate 18, height 1.73 m (5' 8.11\"), weight 93.4 kg (205 lb 14.6 oz), SpO2 92 %.    Wt Readings from Last 3 Encounters:   11/18/20 93.4 kg (205 lb 14.6 oz)       Intake/Output Summary (Last 24 hours) at 11/18/2020 1239  Last data filed at 11/18/2020 1200  Gross per 24 hour   Intake 3074.02 ml   Output 3825 ml   Net -750.98 ml     GENERAL APPEARANCE: Critically ill.   HEENT:  Eyes close. Intubated.   RESP: Diminish BS. No wheezes.   CV: RRR, nl S1/S2, no m/r/g   ABDOMEN: o/s/nt/nd, bs present  EXTREMITIES/SKIN: no rashes/lesions on observed skin; + " edema  NEURO: Sedated  RIJ temp CVC cdi         Data:     CBC RESULTS:     Recent Labs   Lab 11/18/20  0532 11/17/20  0336 11/16/20  0430 11/15/20  0358 11/14/20  0402 11/13/20  0515   WBC  --  13.4* 18.9* 18.4* 21.4* 7.0   RBC  --  4.82 5.00 4.58 4.57 4.36*   HGB  --  14.4 15.2 13.8 14.0 13.1*   HCT  --  44.3 46.2 42.0 41.2 39.2*   * 171 180 178 219 179       Basic Metabolic Panel:  Recent Labs   Lab 11/17/20  0336 11/16/20  0430 11/15/20  0358 11/14/20  0402 11/13/20  0515 11/12/20  0525    141 147* 143 142 145*   POTASSIUM 4.2 4.2 4.1 3.9 3.8 3.4   CHLORIDE 108 106 115* 113* 112* 113*   CO2 33* 31 30 27 26 28   BUN 37* 31* 23 19 15 20   CR 0.60* 0.66 0.67 0.94 0.82 1.04   * 166* 156* 230* 202* 75   JESSICA 7.7* 7.8* 7.9* 7.9* 7.9* 7.9*       INR  Recent Labs   Lab 11/17/20  0400   INR 1.20*      Attestation:   I have reviewed today's relevant vital signs, notes, medications, labs and imaging.    Waldo Thakkar MD  [facility] - Nephrology  Office phone :[phone]  Pager: [phone]

## 2020-11-19 ENCOUNTER — APPOINTMENT (OUTPATIENT)
Dept: GENERAL RADIOLOGY | Facility: CLINIC | Age: 60
DRG: 003 | End: 2020-11-19
Attending: INTERNAL MEDICINE
Payer: COMMERCIAL

## 2020-11-19 ENCOUNTER — ANESTHESIA EVENT (OUTPATIENT)
Dept: SURGERY | Facility: CLINIC | Age: 60
DRG: 003 | End: 2020-11-19
Payer: COMMERCIAL

## 2020-11-19 ENCOUNTER — ANESTHESIA (OUTPATIENT)
Dept: SURGERY | Facility: CLINIC | Age: 60
DRG: 003 | End: 2020-11-19
Payer: COMMERCIAL

## 2020-11-19 LAB
ANION GAP SERPL CALCULATED.3IONS-SCNC: 2 MMOL/L (ref 3–14)
BASE EXCESS BLDA CALC-SCNC: 12.2 MMOL/L
BUN SERPL-MCNC: 47 MG/DL (ref 7–30)
CALCIUM SERPL-MCNC: 7.6 MG/DL (ref 8.5–10.1)
CHLORIDE SERPL-SCNC: 108 MMOL/L (ref 94–109)
CO2 SERPL-SCNC: 37 MMOL/L (ref 20–32)
CREAT SERPL-MCNC: 0.74 MG/DL (ref 0.66–1.25)
ERYTHROCYTE [DISTWIDTH] IN BLOOD BY AUTOMATED COUNT: 15.1 % (ref 10–15)
GFR SERPL CREATININE-BSD FRML MDRD: >90 ML/MIN/{1.73_M2}
GLUCOSE BLDC GLUCOMTR-MCNC: 102 MG/DL (ref 70–99)
GLUCOSE BLDC GLUCOMTR-MCNC: 117 MG/DL (ref 70–99)
GLUCOSE BLDC GLUCOMTR-MCNC: 124 MG/DL (ref 70–99)
GLUCOSE BLDC GLUCOMTR-MCNC: 131 MG/DL (ref 70–99)
GLUCOSE BLDC GLUCOMTR-MCNC: 139 MG/DL (ref 70–99)
GLUCOSE BLDC GLUCOMTR-MCNC: 148 MG/DL (ref 70–99)
GLUCOSE SERPL-MCNC: 111 MG/DL (ref 70–99)
HCO3 BLD-SCNC: 39 MMOL/L (ref 21–28)
HCT VFR BLD AUTO: 40.2 % (ref 40–53)
HGB BLD-MCNC: 12.6 G/DL (ref 13.3–17.7)
MCH RBC QN AUTO: 28.5 PG (ref 26.5–33)
MCHC RBC AUTO-ENTMCNC: 31.3 G/DL (ref 31.5–36.5)
MCV RBC AUTO: 91 FL (ref 78–100)
O2/TOTAL GAS SETTING VFR VENT: ABNORMAL %
OXYHGB MFR BLD: 96 % (ref 92–100)
PCO2 BLD: 56 MM HG (ref 35–45)
PH BLD: 7.45 PH (ref 7.35–7.45)
PLATELET # BLD AUTO: 142 10E9/L (ref 150–450)
PO2 BLD: 89 MM HG (ref 80–105)
POTASSIUM SERPL-SCNC: 3.6 MMOL/L (ref 3.4–5.3)
RBC # BLD AUTO: 4.42 10E12/L (ref 4.4–5.9)
SODIUM SERPL-SCNC: 147 MMOL/L (ref 133–144)
WBC # BLD AUTO: 18.3 10E9/L (ref 4–11)

## 2020-11-19 PROCEDURE — 258N000003 HC RX IP 258 OP 636: Performed by: INTERNAL MEDICINE

## 2020-11-19 PROCEDURE — 250N000009 HC RX 250: Performed by: THORACIC SURGERY (CARDIOTHORACIC VASCULAR SURGERY)

## 2020-11-19 PROCEDURE — 999N000009 HC STATISTIC AIRWAY CARE

## 2020-11-19 PROCEDURE — 94003 VENT MGMT INPAT SUBQ DAY: CPT

## 2020-11-19 PROCEDURE — 250N000003 HC SEVOFLURANE, EA 15 MIN: Performed by: THORACIC SURGERY (CARDIOTHORACIC VASCULAR SURGERY)

## 2020-11-19 PROCEDURE — 250N000013 HC RX MED GY IP 250 OP 250 PS 637: Performed by: INTERNAL MEDICINE

## 2020-11-19 PROCEDURE — 250N000011 HC RX IP 250 OP 636: Performed by: NURSE ANESTHETIST, CERTIFIED REGISTERED

## 2020-11-19 PROCEDURE — 360N000015 HC SURGERY LEVEL 2 EA 15 ADDTL MIN: Performed by: THORACIC SURGERY (CARDIOTHORACIC VASCULAR SURGERY)

## 2020-11-19 PROCEDURE — 370N000001 HC ANESTHESIA TECHNICAL FEE, 1ST 30 MIN: Performed by: THORACIC SURGERY (CARDIOTHORACIC VASCULAR SURGERY)

## 2020-11-19 PROCEDURE — 272N000001 HC OR GENERAL SUPPLY STERILE: Performed by: THORACIC SURGERY (CARDIOTHORACIC VASCULAR SURGERY)

## 2020-11-19 PROCEDURE — 370N000002 HC ANESTHESIA TECHNICAL FEE, EACH ADDTL 15 MIN: Performed by: THORACIC SURGERY (CARDIOTHORACIC VASCULAR SURGERY)

## 2020-11-19 PROCEDURE — 80048 BASIC METABOLIC PNL TOTAL CA: CPT | Performed by: INTERNAL MEDICINE

## 2020-11-19 PROCEDURE — 999N000065 XR CHEST PORT 1 VW

## 2020-11-19 PROCEDURE — 36514 APHERESIS PLASMA: CPT

## 2020-11-19 PROCEDURE — 250N000011 HC RX IP 250 OP 636: Performed by: INTERNAL MEDICINE

## 2020-11-19 PROCEDURE — 82805 BLOOD GASES W/O2 SATURATION: CPT | Performed by: INTERNAL MEDICINE

## 2020-11-19 PROCEDURE — 258N000003 HC RX IP 258 OP 636: Performed by: NURSE ANESTHETIST, CERTIFIED REGISTERED

## 2020-11-19 PROCEDURE — 99291 CRITICAL CARE FIRST HOUR: CPT | Mod: GC | Performed by: INTERNAL MEDICINE

## 2020-11-19 PROCEDURE — P9041 ALBUMIN (HUMAN),5%, 50ML: HCPCS | Performed by: INTERNAL MEDICINE

## 2020-11-19 PROCEDURE — 999N000065 XR ABDOMEN PORT 1 VW

## 2020-11-19 PROCEDURE — 250N000009 HC RX 250: Performed by: INTERNAL MEDICINE

## 2020-11-19 PROCEDURE — 200N000001 HC R&B ICU

## 2020-11-19 PROCEDURE — 360N000014 HC SURGERY LEVEL 2 1ST 30 MIN: Performed by: THORACIC SURGERY (CARDIOTHORACIC VASCULAR SURGERY)

## 2020-11-19 PROCEDURE — 999N000157 HC STATISTIC RCP TIME EA 10 MIN

## 2020-11-19 PROCEDURE — 99291 CRITICAL CARE FIRST HOUR: CPT | Performed by: PSYCHIATRY & NEUROLOGY

## 2020-11-19 PROCEDURE — 999N001017 HC STATISTIC GLUCOSE BY METER IP

## 2020-11-19 PROCEDURE — 85027 COMPLETE CBC AUTOMATED: CPT | Performed by: INTERNAL MEDICINE

## 2020-11-19 PROCEDURE — 250N000009 HC RX 250: Performed by: NURSE ANESTHETIST, CERTIFIED REGISTERED

## 2020-11-19 PROCEDURE — C9113 INJ PANTOPRAZOLE SODIUM, VIA: HCPCS | Performed by: INTERNAL MEDICINE

## 2020-11-19 PROCEDURE — 0B110F4 BYPASS TRACHEA TO CUTANEOUS WITH TRACHEOSTOMY DEVICE, OPEN APPROACH: ICD-10-PCS | Performed by: THORACIC SURGERY (CARDIOTHORACIC VASCULAR SURGERY)

## 2020-11-19 PROCEDURE — 36600 WITHDRAWAL OF ARTERIAL BLOOD: CPT

## 2020-11-19 PROCEDURE — 250N000012 HC RX MED GY IP 250 OP 636 PS 637: Performed by: STUDENT IN AN ORGANIZED HEALTH CARE EDUCATION/TRAINING PROGRAM

## 2020-11-19 RX ORDER — CALCIUM GLUCONATE 94 MG/ML
1 INJECTION, SOLUTION INTRAVENOUS
Status: DISCONTINUED | OUTPATIENT
Start: 2020-11-19 | End: 2020-11-19

## 2020-11-19 RX ORDER — MAGNESIUM HYDROXIDE 1200 MG/15ML
LIQUID ORAL PRN
Status: DISCONTINUED | OUTPATIENT
Start: 2020-11-19 | End: 2020-11-19 | Stop reason: HOSPADM

## 2020-11-19 RX ORDER — LABETALOL HYDROCHLORIDE 5 MG/ML
10-20 INJECTION, SOLUTION INTRAVENOUS EVERY 4 HOURS
Status: DISCONTINUED | OUTPATIENT
Start: 2020-11-19 | End: 2020-11-19

## 2020-11-19 RX ORDER — HEPARIN SODIUM 1000 [USP'U]/ML
1.4 INJECTION, SOLUTION INTRAVENOUS; SUBCUTANEOUS
Status: COMPLETED | OUTPATIENT
Start: 2020-11-19 | End: 2020-11-19

## 2020-11-19 RX ORDER — HEPARIN SODIUM 5000 [USP'U]/.5ML
5000 INJECTION, SOLUTION INTRAVENOUS; SUBCUTANEOUS EVERY 8 HOURS
Status: DISCONTINUED | OUTPATIENT
Start: 2020-11-19 | End: 2020-12-05 | Stop reason: HOSPADM

## 2020-11-19 RX ORDER — LABETALOL HYDROCHLORIDE 5 MG/ML
10-20 INJECTION, SOLUTION INTRAVENOUS EVERY 4 HOURS PRN
Status: DISCONTINUED | OUTPATIENT
Start: 2020-11-19 | End: 2020-12-05 | Stop reason: HOSPADM

## 2020-11-19 RX ORDER — DIPHENHYDRAMINE HYDROCHLORIDE 50 MG/ML
50 INJECTION INTRAMUSCULAR; INTRAVENOUS
Status: DISCONTINUED | OUTPATIENT
Start: 2020-11-19 | End: 2020-11-19

## 2020-11-19 RX ORDER — FUROSEMIDE 10 MG/ML
40 INJECTION INTRAMUSCULAR; INTRAVENOUS DAILY
Status: DISCONTINUED | OUTPATIENT
Start: 2020-11-19 | End: 2020-11-22 | Stop reason: ALTCHOICE

## 2020-11-19 RX ORDER — ALBUMIN HUMAN 25 %
3000 INTRAVENOUS SOLUTION INTRAVENOUS
Status: COMPLETED | OUTPATIENT
Start: 2020-11-19 | End: 2020-11-19

## 2020-11-19 RX ADMIN — PHENYLEPHRINE HYDROCHLORIDE 200 MCG: 10 INJECTION INTRAVENOUS at 14:04

## 2020-11-19 RX ADMIN — SODIUM CHLORIDE, POTASSIUM CHLORIDE, SODIUM LACTATE AND CALCIUM CHLORIDE 250 ML: 600; 310; 30; 20 INJECTION, SOLUTION INTRAVENOUS at 23:11

## 2020-11-19 RX ADMIN — QUETIAPINE 25 MG: 25 TABLET ORAL at 21:11

## 2020-11-19 RX ADMIN — PHENYLEPHRINE HYDROCHLORIDE 100 MCG: 10 INJECTION INTRAVENOUS at 14:02

## 2020-11-19 RX ADMIN — FUROSEMIDE 40 MG: 10 INJECTION, SOLUTION INTRAVENOUS at 16:50

## 2020-11-19 RX ADMIN — CARBOXYMETHYLCELLULOSE SODIUM 1 DROP: 5 SOLUTION/ DROPS OPHTHALMIC at 08:53

## 2020-11-19 RX ADMIN — NYSTATIN 500000 UNITS: 100000 SUSPENSION ORAL at 08:54

## 2020-11-19 RX ADMIN — AMLODIPINE BESYLATE 10 MG: 10 TABLET ORAL at 08:53

## 2020-11-19 RX ADMIN — CHLORHEXIDINE GLUCONATE 0.12% ORAL RINSE 15 ML: 1.2 LIQUID ORAL at 08:53

## 2020-11-19 RX ADMIN — CARBOXYMETHYLCELLULOSE SODIUM 1 DROP: 5 SOLUTION/ DROPS OPHTHALMIC at 15:26

## 2020-11-19 RX ADMIN — ALBUMIN HUMAN 3000 ML: 0.05 INJECTION, SOLUTION INTRAVENOUS at 08:41

## 2020-11-19 RX ADMIN — CHLORHEXIDINE GLUCONATE 0.12% ORAL RINSE 15 ML: 1.2 LIQUID ORAL at 19:34

## 2020-11-19 RX ADMIN — CALCIUM GLUCONATE 3 G: 98 INJECTION, SOLUTION INTRAVENOUS at 08:41

## 2020-11-19 RX ADMIN — CARBOXYMETHYLCELLULOSE SODIUM 1 DROP: 5 SOLUTION/ DROPS OPHTHALMIC at 13:51

## 2020-11-19 RX ADMIN — LABETALOL HYDROCHLORIDE 10 MG: 5 INJECTION, SOLUTION INTRAVENOUS at 00:40

## 2020-11-19 RX ADMIN — MIDAZOLAM (PF) 1 MG/ML IN 0.9 % SODIUM CHLORIDE INTRAVENOUS SOLUTION 6 MG/HR: at 08:57

## 2020-11-19 RX ADMIN — ROCURONIUM BROMIDE 30 MG: 10 INJECTION INTRAVENOUS at 14:01

## 2020-11-19 RX ADMIN — CARBOXYMETHYLCELLULOSE SODIUM 1 DROP: 5 SOLUTION/ DROPS OPHTHALMIC at 10:50

## 2020-11-19 RX ADMIN — CARBOXYMETHYLCELLULOSE SODIUM 1 DROP: 5 SOLUTION/ DROPS OPHTHALMIC at 23:10

## 2020-11-19 RX ADMIN — PREDNISONE 60 MG: 20 TABLET ORAL at 08:53

## 2020-11-19 RX ADMIN — QUETIAPINE 25 MG: 25 TABLET ORAL at 08:53

## 2020-11-19 RX ADMIN — CARBOXYMETHYLCELLULOSE SODIUM 1 DROP: 5 SOLUTION/ DROPS OPHTHALMIC at 18:01

## 2020-11-19 RX ADMIN — ANTICOAGULANT CITRATE DEXTROSE SOLUTION FORMULA A 750 ML: 12.25; 11; 3.65 SOLUTION INTRAVENOUS at 08:42

## 2020-11-19 RX ADMIN — CARBOXYMETHYLCELLULOSE SODIUM 1 DROP: 5 SOLUTION/ DROPS OPHTHALMIC at 19:34

## 2020-11-19 RX ADMIN — PANTOPRAZOLE SODIUM 40 MG: 40 INJECTION, POWDER, FOR SOLUTION INTRAVENOUS at 08:54

## 2020-11-19 RX ADMIN — PHENYLEPHRINE HYDROCHLORIDE 150 MCG: 10 INJECTION INTRAVENOUS at 14:27

## 2020-11-19 RX ADMIN — Medication 0.4 MG/HR: at 19:33

## 2020-11-19 RX ADMIN — SODIUM CHLORIDE: 9 INJECTION, SOLUTION INTRAVENOUS at 10:48

## 2020-11-19 RX ADMIN — NYSTATIN 500000 UNITS: 100000 SUSPENSION ORAL at 13:51

## 2020-11-19 RX ADMIN — HEPARIN SODIUM 1400 UNITS: 1000 INJECTION INTRAVENOUS; SUBCUTANEOUS at 10:17

## 2020-11-19 RX ADMIN — HEPARIN SODIUM 5000 UNITS: 5000 INJECTION, SOLUTION INTRAVENOUS; SUBCUTANEOUS at 19:34

## 2020-11-19 RX ADMIN — NYSTATIN 500000 UNITS: 100000 SUSPENSION ORAL at 18:01

## 2020-11-19 RX ADMIN — PHENYLEPHRINE HYDROCHLORIDE 100 MCG: 10 INJECTION INTRAVENOUS at 14:25

## 2020-11-19 RX ADMIN — SULFAMETHOXAZOLE AND TRIMETHOPRIM 80 MG: 200; 40 SUSPENSION ORAL at 08:53

## 2020-11-19 RX ADMIN — NYSTATIN 500000 UNITS: 100000 SUSPENSION ORAL at 23:09

## 2020-11-19 ASSESSMENT — MIFFLIN-ST. JEOR
SCORE: 1721.38
SCORE: 1707.25

## 2020-11-19 ASSESSMENT — ACTIVITIES OF DAILY LIVING (ADL)
ADLS_ACUITY_SCORE: 26
ADLS_ACUITY_SCORE: 26
ADLS_ACUITY_SCORE: 24
ADLS_ACUITY_SCORE: 24
ADLS_ACUITY_SCORE: 26
ADLS_ACUITY_SCORE: 24

## 2020-11-19 NOTE — PROGRESS NOTES
"Apheresis Treatment -     Treatment in room 346   Treatment number: 5/5    Height: 5\"9\"  Weight: 91.6kg  HCT: 40%  Inlet speed: 70  ACDA ratio: 10:1  Fluid balance: 100%  Access: RIJ  Post treatment line dwell: heparin 1,000units/ml, red port 1300 units (1.3mls), blue port 1300 units (1.3mls).  Replacement product: 5% Albumin  Electrolyte replacement: Calcium Gluconate 3 grams in NS - total 80 mls, piggybacked into return lines, infused over time of treatment.  Premedications: none    Treatment Notes: patient rested with eyes closed throughout treatment    Treatment completed as ordered, VSS, see EPIC flowsheet for run data.    Next treatment: TBD  "

## 2020-11-19 NOTE — ANESTHESIA PREPROCEDURE EVALUATION
Anesthesia Pre-Procedure Evaluation    Patient: Roosevelt Burris   MRN: [id number] : 1960          Preoperative Diagnosis: Respiratory failure (H) [J96.90]    Procedure(s):  TRACHEOSTOMY    Past Medical History:   Diagnosis Date     Myasthenia gravis with exacerbation (H) 2020    EMG confirmed, antibodies pending     No past surgical history on file.    Anesthesia Evaluation     .             ROS/MED HX    ENT/Pulmonary:     (+), . Other pulmonary disease acute respiratory failure, vented since 11/10/20.    Neurologic:     (+)other neuro TBI with dysarthria and hemiparesis. Generalized weakness and resp distress on admission from suspected Myesthenia Gravis with acute exacerbation.     Cardiovascular:  - neg cardiovascular ROS       METS/Exercise Tolerance:     Hematologic:  - neg hematologic  ROS       Musculoskeletal:  - neg musculoskeletal ROS       GI/Hepatic:        (-) GERD   Renal/Genitourinary:         Endo:  - neg endo ROS       Psychiatric:         Infectious Disease:  - neg infectious disease ROS       Malignancy:         Other:                          Physical Exam  Normal systems: cardiovascular, pulmonary and dental    Airway   Comment: ETT in place    Dental     Cardiovascular       Pulmonary             Lab Results   Component Value Date    WBC 18.3 (H) 2020    HGB 12.6 (L) 2020    HCT 40.2 2020     (L) 2020     (H) 2020    POTASSIUM 3.6 2020    CHLORIDE 108 2020    CO2 37 (H) 2020    BUN 47 (H) 2020    CR 0.74 2020     (H) 2020    JESSICA 7.6 (L) 2020    PHOS 2.9 2020    MAG 2.6 (H) 2020    ALBUMIN 3.3 (L) 11/15/2020    PROTTOTAL 4.9 (L) 11/15/2020    ALT 34 11/15/2020    AST 16 11/15/2020    ALKPHOS 37 (L) 11/15/2020    BILITOTAL 0.2 11/15/2020    INR 1.20 (H) 2020    FIBR 97 (LL) 2020    TSH 0.34 (L) 2020       Preop Vitals  BP Readings from Last 3 Encounters:  "  11/19/20 122/73    Pulse Readings from Last 3 Encounters:   11/19/20 106      Resp Readings from Last 3 Encounters:   11/19/20 17    SpO2 Readings from Last 3 Encounters:   11/19/20 95%      Temp Readings from Last 1 Encounters:   11/19/20 36.2  C (97.2  F) (Axillary)    Ht Readings from Last 1 Encounters:   11/19/20 1.753 m (5' 9\")      Wt Readings from Last 1 Encounters:   11/19/20 91.6 kg (201 lb 15.1 oz)    Estimated body mass index is 29.82 kg/m  as calculated from the following:    Height as of this encounter: 1.753 m (5' 9\").    Weight as of this encounter: 91.6 kg (201 lb 15.1 oz).       Anesthesia Plan      History & Physical Review  History and physical reviewed and following examination; no interval change.    ASA Status:  4 .    NPO Status:  > 8 hours    Plan for General (ETT in situ)            Postoperative Care  Postoperative pain management:  IV analgesics.      Consents                 Sandeep Villavicencio, DO, DO  "

## 2020-11-19 NOTE — PROGRESS NOTES
Patient weaned off precedex due to bradycardia. Patient a lot more awake and follows commands more consistently. Patient weaned from 100% on the vent to 60%. Patient was given 80 mg IV  Lasix. Patient put out 3L of urine. Heart rate increased gave 500ml of NS and gave 5 mg IV metoprolol to treat heart rate per MD.

## 2020-11-19 NOTE — PROGRESS NOTES
Isaac's test performed prior to radial ABG draw. Collateral circulation confirmed.  Site:  Right radial   FiO2:  60%   Device- vent          Cl Means, RT  11/18/2020

## 2020-11-19 NOTE — PROGRESS NOTES
[facility] ICU RESPIRATORY NOTE        Date of Admission: 11/10/2020    Date of Intubation (most recent): Trached 11/19/20    Reason for Mechanical Ventilation: Resp Failure    Number of Days on Mechanical Ventilation: 10    Met Criteria for Spontaneous Breathing Trial: NO    Reason for No Spontaneous Breathing Trial: PerMD    Significant Events Today: Trached today    ABG Results:   Recent Labs   Lab 11/19/20  0011 11/18/20  1030 11/18/20  0542 11/17/20  1619 11/17/20  1330   PH 7.45 7.46* 7.45 7.32* 7.31*   PCO2 56* 49* 51* 63* 65*   PO2 89 143* 60* 82 74*   HCO3 39* 34* 35* 33* 33*   O2PER 60% 100%  --  100% 100%       Current Vent Settings: Ventilation Mode: CMV/AC  (Continuous Mandatory Ventilation/ Assist Control)  FiO2 (%): 60 %  Rate Set (breaths/minute): 16 breaths/min  Tidal Volume Set (mL): 500 mL  PEEP (cm H2O): 14 cmH2O  Oxygen Concentration (%): 80 %  Resp: 21      Skin Assessment: Clean and dry    Yamile Zaidi.RT    Plan: Maintain current vent settings    Yamile Zaidi RT
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
## 2020-11-19 NOTE — PROGRESS NOTES
CLINICAL NUTRITION SERVICES - REASSESSMENT NOTE      Future/Additional Recommendations: Once Trach placed and able to resume TF, recommend goal of Isosource 1.5 at 65 mL/hr as above    Malnutrition: (11/11)  % Weight Loss:  > 5% in 1 month (severe malnutrition)  % Intake:  Unable to determine  Subcutaneous Fat Loss: Unable to determine  Muscle Loss:  Unable to determine, but suspect  Fluid Retention:  None noted     Malnutrition Diagnosis: Unable to determine due to inability to perform Nutrition Focused Physical Exam       EVALUATION OF PROGRESS TOWARD GOALS   Diet:  NPO    Nutrition Support:  Patient's TF was increased to goal on 11/16 and has remained there until this morning when TF was turned off for Trach.  Goal TF per Epic is as follows ~    Nutrition Support Enteral:  Type of Feeding Tube: NG  Enteral Frequency:  Continuous  Enteral Regimen: Isosource 1.5 at 65 mL/hr  Total Enteral Provisions: 2340 kcal (29 kcal/kg), 106 g protein (1.3 g/kg), 275 g CHO, 23 g fiber, 1186 mL H2O  Free Water Flush: 200 mL every 4 hours       Intake/Tolerance:    Na 147 (H) - Free water last increased on 11/15  -260 with Medium sliding scale insulin, on Prednisone   I/O 3119/5195, wt 91.6 kg (up 9.8 kg from admit)  Stool 200 mL       ASSESSED NUTRITION NEEDS:  Dosing Weight 81.8 kg (admit wt)  Estimated Energy Needs: 3919-9182 kcals (25-30 Kcal/Kg)  Justification: maintenance  Estimated Protein Needs:  grams protein (1.2-1.5 g pro/Kg)  Justification: repletion      NEW FINDINGS:   Plan for Trach today per rounds  Patient to receive last round of plasmaphresis today     Previous Goals (11/16):   TF will meet % needs   Evaluation: Not met - Patient was previously meeting but TF now off for Trach     Previous Nutrition Diagnosis (11/16):   Inadequate energy intake related to TF at 55 mL/hr (1.0 kcal/mL formula), Propofol and D5 now off as evidenced by meeting ~2/3 energy needs from current TF regimen  Evaluation:  Changed       CURRENT NUTRITION DIAGNOSIS  Inadequate protein-energy intake related to NPO, TF on hold for Trach as evidenced by meeting 0% needs     INTERVENTIONS  Recommendations / Nutrition Prescription  Once Trach placed and able to resume TF, recommend goal of Isosource 1.5 at 65 mL/hr as above     Implementation  Collaboration and Referral of Nutrition care:  Patient discussed today during interdisciplinary bedside rounds     Goals  TF will resume within the next 24 hours and patient will meet % needs from goal TF regimen     MONITORING AND EVALUATION:  Progress towards goals will be monitored and evaluated per protocol and Practice Guidelines    Tanisha Echols RD, LD, CNSC   Clinical Dietitian - [facility]

## 2020-11-19 NOTE — OP NOTE
DATE OF PROCEDURE: November 19, 2020      SURGEON:  Jarret Junior MD   FIRST ASSIST: Isabella Hodges PA-C      PREOPERATIVE DIAGNOSIS:  Respiratory failure.       POSTOPERATIVE DIAGNOSIS:  Respiratory failure.       PROCEDURE:  Tracheostomy with Shiley #8 cuffed nonfenestrated tube.       ANESTHESIA:  General.       INDICATIONS:  Patient has respiratory failure and will require prolonged mechanical ventilation.       DESCRIPTION OF PROCEDURE:  The patient was brought to the operating room on the ICU bed.  Under general anesthesia, the patient's neck was extended.  Neck and upper chest were prepared and draped in the usual fashion using ChloraPrep.  A transverse incision was made approximately 2 cm above the sternal notch.  Dissection was carried down to the midline of the strap muscle.  The inferior aspect of the isthmus of the thyroid was divided.  Hemostasis was excellent.  The second ring of the trachea was clearly identified, and some sutures of 2-0 Prolene were placed around the second ring laterally on each side.  A longitudinal tracheotomy was made and dilated.  The endotracheal tube was pulled just above the tracheotomy, and a Shiley #8 cuffed nonfenestrated tube was advanced without any difficulty.  The cuff was inflated.  Ventilation was excellent.  Hemostasis was again verified and was excellent.  Incision was closed with interrupted 3-0 nylon suture on the skin along the tracheostomy.  A dressing was applied, and the tracheostomy was secured around the patient's neck.       The patient was returned to ICU in satisfactory condition.           JARRET JUNIOR MD

## 2020-11-19 NOTE — PROGRESS NOTES
" Renal Medicine Progress Note            Assessment/Plan:     # Myasthenia gravis: Newly diagnosed and presented with crisis.                -5/5 PLEX               -on prednisone  # Hypoxic respiratory failure: Aspiration PNA.                -Unasyn    -possible trach today per ICU team     # FEN: Wt is significant up compare to admission weight. Hyponatremia.     # HTN: On Norvasc.     Plan:  # Intermittent diuretic as needed per ICU team  # 5/5 plasmapheresis treatment tomorrow. Neurology to let us know if patient would need more treatments  # Needs more free water for hypernatremia    Please call with any questions or concerns. We are signing off unless. Let us know if you would like to continue PLEX. Thank you.         Interval History:     Afebrile. Tachy. BP is okay. Had 5 liters of urine output with high dose Lasix.  He is getting 5/5 plasmapheresis treatment.           Medications and Allergies:       amLODIPine  10 mg Per Feeding Tube Daily     artificial tears ophthalmic solution  1 drop Both Eyes Q2H While awake     chlorhexidine  15 mL Mouth/Throat Q12H     insulin aspart  1-6 Units Subcutaneous Q4H     nystatin  500,000 Units Mouth/Throat 4x Daily     pantoprazole (PROTONIX) IV  40 mg Intravenous Daily     predniSONE  60 mg Oral Daily     QUEtiapine  25 mg Oral BID     sulfamethoxazole-trimethoprim  10 mL Per Feeding Tube Daily        Allergies   Allergen Reactions     Dexmedetomidine      Bradycardia even at very low dose              Physical Exam:   Vitals were reviewed   , Blood pressure 107/77, pulse 107, temperature 97.2  F (36.2  C), temperature source Axillary, resp. rate 16, height 1.753 m (5' 9\"), weight 91.6 kg (201 lb 15.1 oz), SpO2 97 %.    Wt Readings from Last 3 Encounters:   11/19/20 91.6 kg (201 lb 15.1 oz)       Intake/Output Summary (Last 24 hours) at 11/19/2020 1106  Last data filed at 11/19/2020 1100  Gross per 24 hour   Intake 2543.34 ml   Output 6095 ml   Net -3551.66 ml "     GENERAL APPEARANCE: Critically ill.   HEENT:  Eyes close. Intubated.   RESP: Diminish BS. No wheezes.   CV: RRR, nl S1/S2, no m/r/g   ABDOMEN: o/s/nt/nd, bs present  EXTREMITIES/SKIN: no rashes/lesions on observed skin; + edema  NEURO: Sedated  RIJ temp CVC cdi         Data:     CBC RESULTS:     Recent Labs   Lab 11/19/20  0511 11/18/20  0532 11/17/20  0336 11/16/20  0430 11/15/20  0358 11/14/20  0402 11/13/20  0515   WBC 18.3*  --  13.4* 18.9* 18.4* 21.4* 7.0   RBC 4.42  --  4.82 5.00 4.58 4.57 4.36*   HGB 12.6*  --  14.4 15.2 13.8 14.0 13.1*   HCT 40.2  --  44.3 46.2 42.0 41.2 39.2*   * 135* 171 180 178 219 179       Basic Metabolic Panel:  Recent Labs   Lab 11/19/20  0511 11/18/20  1400 11/18/20  0800 11/17/20  0336 11/16/20  0430 11/15/20  0358   * 143 144 143 141 147*   POTASSIUM 3.6 3.7 4.0 4.2 4.2 4.1   CHLORIDE 108 104 108 108 106 115*   CO2 37* 34* 32 33* 31 30   BUN 47* 53* 53* 37* 31* 23   CR 0.74 0.74 0.68 0.60* 0.66 0.67   * 274* 176* 226* 166* 156*   JESSICA 7.6* 7.6* 7.6* 7.7* 7.8* 7.9*       INR  Recent Labs   Lab 11/17/20  0400   INR 1.20*      Attestation:   I have reviewed today's relevant vital signs, notes, medications, labs and imaging.    Waldo Thakkar MD  [facility] - Nephrology  Office phone :[phone]  Pager: [phone]

## 2020-11-19 NOTE — PROGRESS NOTES
[facility] ICU RESPIRATORY NOTE           Date of Admission: 11/10/2020     Date of Intubation (most recent): 11/10/2020     Reason for Mechanical Ventilation: Respiratory Failure     Number of Days on Mechanical Ventilation: 10     Met Criteria for Spontaneous Breathing Trial: Yes     Reason for No Pressure Support Trial: per MD       Ventilation Mode: CMV/AC  (Continuous Mandatory Ventilation/ Assist Control)  FiO2 (%): 100 %  Rate Set (breaths/minute): 16 breaths/min  Tidal Volume Set (mL): 450 mL  PEEP (cm H2O): 15 cmH2O  Pressure Support (cm H2O): (S) 10 cmH2O  Oxygen Concentration (%): 80 %  Resp: 17              Last Arterial Blood Gas:  pH Arterial   Date Value Ref Range Status   11/19/2020 7.45 7.35 - 7.45 pH Final     pCO2 Arterial   Date Value Ref Range Status   11/19/2020 56 (H) 35 - 45 mm Hg Final     pO2 Arterial   Date Value Ref Range Status   11/19/2020 89 80 - 105 mm Hg Final     Bicarbonate Arterial   Date Value Ref Range Status   11/19/2020 39 (H) 21 - 28 mmol/L Final     Base Excess Art   Date Value Ref Range Status   11/19/2020 12.2 mmol/L Final     Comment:     Abnormal Result, Ref range: -9.0 to 1.8       Significant Events Tonight : ABG done overnight to assess for PEEP weaning.         Plan:       [facility] ICU RESPIRATORY NOTE           Date of Admission: 11/10/2020     Date of Intubation (most recent): 11/10/2020     Reason for Mechanical Ventilation: Respiratory Failure     Number of Days on Mechanical Ventilation: 9     Met Criteria for Spontaneous Breathing Trial: Yes   Reason for No Pressure Support Trial: per MD     Ventilation Mode: CMV/AC  (Continuous Mandatory Ventilation/ Assist Control)  FiO2 (%): 100 %  Rate Set (breaths/minute): 16 breaths/min  Tidal Volume Set (mL): 450 mL  PEEP (cm H2O): 15 cmH2O  Pressure Support (cm H2O): (S) 10 cmH2O  Oxygen Concentration (%): 80 %  Resp: 17            Recent Labs   Lab 11/17/20  1619 11/17/20  1330 11/14/20  1400 11/14/20  1235   PH 7.32* 7.31* 7.34*  7.34*   PCO2 63* 65* 49* 48*   PO2 82 74* 67* 46*   HCO3 33* 33* 26 26   O2PER 100% 100% 100% 70         Significant Events Tonight : none during this shift        Plan:  Continue full vent support tonight , assess for weaning readiness daily       Cl Means, RT

## 2020-11-19 NOTE — PROGRESS NOTES
"[facility]    Stroke/Neurocritical Care Progress Note    Interval Events  Again more somnolent today, completed PLEX #5. Will go for trach later this afternoon.    Impression  1. 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 Encouraging response to therapy with regard to best bulbar and limb exams over the past few days, but delayed respiratory status response may force the need for trach/PEG    2. Respiratory paralysis- secondary to #1, following daily inspiratory forces/tidal volumes in response to therapy    3. Pain/sedation- to tolerate intubation    4. Lung consolidation- consider aspiration secondary to bulbar weakness/dysphagia and low lung volumes, management per Intensivist    Plan  Completed PLEX x5 rounds today  Completed methylprednisolone 500 mg IV q12 x10  Continue prednisone 60 mg daily per tube, plan to continue ongoing  Daily MIP and tidal volume per RT (trend documented in flowsheets, and in physical exam of this note)  Will start pyridostigmine prior to extubation readiness  Can follow up with Dr. Greenwood of [facility] post-hospital for ongoing management  Remainder of cares per Intensivist    The Stroke/Neurocritical Care Staff is Dr. Delaney.     Vicki Burns MD  Vascular Neurology Fellow  To page me or covering stroke neurology team member, click here: AMCOM   Choose \"On Call\" tab at top, then search dropdown box for \"Neurology Adult\", select location, press Enter, then look for stroke/neuro ICU/telestroke.    ______________________________________________________    Medications   Home Meds  Prior to Admission medications    Medication Sig Start Date End Date Taking? Authorizing Provider   amoxicillin-clavulanate (AUGMENTIN) 875-125 MG " tablet Take 1 tablet by mouth 2 times daily X 10 days (started 11/9/2020)   Yes Unknown, Entered By History   HYDROcodone-acetaminophen (NORCO) 5-325 MG tablet Take 1-2 tablets by mouth 2 times daily as needed for severe pain    Yes Unknown, Entered By History   predniSONE (DELTASONE) 20 MG tablet Take 20 mg by mouth 2 times daily X 5 days (started 11/9/2020)   Yes Unknown, Entered By History   tamsulosin (FLOMAX) 0.4 MG capsule Take 0.4 mg by mouth daily   Yes Unknown, Entered By History       Scheduled Meds    amLODIPine  10 mg Per Feeding Tube Daily     artificial tears ophthalmic solution  1 drop Both Eyes Q2H While awake     chlorhexidine  15 mL Mouth/Throat Q12H     insulin aspart  1-6 Units Subcutaneous Q4H     nystatin  500,000 Units Mouth/Throat 4x Daily     pantoprazole (PROTONIX) IV  40 mg Intravenous Daily     predniSONE  60 mg Oral Daily     QUEtiapine  25 mg Oral BID     sulfamethoxazole-trimethoprim  10 mL Per Feeding Tube Daily       Infusion Meds    HYDROmorphone 0.4 mg/hr (11/18/20 0719)     midazolam 6 mg/hr (11/18/20 1750)     sodium chloride 20 mL/hr at 11/17/20 0740       PRN Meds  sodium chloride 0.9%, sodium chloride 0.9%, sodium chloride 0.9%, acetaminophen **OR** acetaminophen, albuterol, calcium gluconate, calcium gluconate, calcium gluconate, calcium gluconate, calcium gluconate, calcium gluconate, glucose **OR** dextrose **OR** glucagon, diphenhydrAMINE, diphenhydrAMINE, fentaNYL, heparin Lock (1000 units/mL High concentration), heparin Lock (1000 units/mL High concentration), heparin Lock (1000 units/mL High concentration), hydromorphone, labetalol, midazolam, naloxone, ondansetron **OR** ondansetron, senna-docusate **OR** senna-docusate       PHYSICAL EXAMINATION  Temp:  [97  F (36.1  C)-98  F (36.7  C)] 98  F (36.7  C)  Pulse:  [] 98  Resp:  [9-21] 15  BP: ()/() 126/89  FiO2 (%):  [60 %-80 %] 60 %  SpO2:  [92 %-99 %] 97 %     Date 11/13 11/14 11/15 11/16 11/17 11/18  11/19   [facility]/NIF -7 -4 -3 -5 -1     Vital Capacity 290 180 240 320 500         General:  patient lying in bed without any acute distress    HEENT:  normocephalic/atraumatic, intubated, right tongue laceration with dried and fresh blood in mouth, edentulous  Cardio:  RRR  Pulmonary:  on mechanical ventilation  Abdomen:  soft, non-tender, non-distended  Extremities:  no edema, peripheral pulses palpable  Skin:  intact, warm/dry      Neurologic  Mental Status:  examined on dilaudid/versed. Eyes closed, intermittently awakens and is briefly alert and attentive. Follows complex commands, shakes/nods/thumbs up/down appropriately.  Cranial Nerves:  VFF, PERRL, ptosis present again, conjugate rest gaze, extraocular movements improved to L, up, and down, still largely restricted to R, lower facial musculature obscured by ETT, but now able to fully purse lips around tube,  tongue protrudes, obscured by ETT  Motor: movements still brisk, elbow flexion 4/5, elbow extension 4-/5, finger flexion 5/5, finger extension 4/5. Hip flexion 4/5, knee flexion 4+/5, knee extension 4+/5, ankle dorsiflexion 5/5, ankle plantarflexion 5/5. No fasciculations, twitches, or abnormal movements.  Reflexes:  2+ intact and symmetric reflexes in bilateral biceps, brachioradialis, patellae, achilles, toes downgoing  Sensory:  light touch sensation intact and symmetric throughout upper and lower extremities  Coordination:  not tested  Station/Gait:  not tested    Imaging  I personally reviewed all imaging; relevant findings per HPI.     Lab Results Data   CBC  Recent Labs   Lab 11/19/20  0511 11/18/20  0532 11/17/20  0336 11/16/20  0430   WBC 18.3*  --  13.4* 18.9*   RBC 4.42  --  4.82 5.00   HGB 12.6*  --  14.4 15.2   HCT 40.2  --  44.3 46.2   * 135* 171 180     Basic Metabolic Panel    Recent Labs   Lab 11/19/20  0511 11/18/20  1400 11/18/20  0800   * 143 144   POTASSIUM 3.6 3.7 4.0   CHLORIDE 108 104 108   CO2 37* 34* 32   BUN 47* 53*  53*   CR 0.74 0.74 0.68   * 274* 176*   JESSICA 7.6* 7.6* 7.6*     Liver Panel  Recent Labs   Lab 11/15/20  0358   PROTTOTAL 4.9*   ALBUMIN 3.3*   BILITOTAL 0.2   ALKPHOS 37*   AST 16   ALT 34     INR    Recent Labs   Lab Test 11/17/20  0400   INR 1.20*      Lipid Profile  No lab results found.  A1C    Recent Labs   Lab Test 11/14/20  0402   A1C 6.0*     Troponin I    Recent Labs   Lab 11/18/20  0800   TROPI 0.030

## 2020-11-19 NOTE — BRIEF OP NOTE
[facility]    Brief Operative Note    Pre-operative diagnosis: Respiratory failure (H) [J96.90]  Post-operative diagnosis respiratory failure    Procedure: Procedure(s):  TRACHEOSTOMY, 8 Shiley, cuffed, nonfenestrated  Surgeon: Surgeon(s) and Role:     * Jarret Junior MD - Primary     * Isabella Hodges PA-C - Assisting  Anesthesia: General   Estimated blood loss:  2 cc  Drains: None  Specimens: * No specimens in log *  Findings:   None.  Complications: None.  Implants: * No implants in log *
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
## 2020-11-19 NOTE — ANESTHESIA CARE TRANSFER NOTE
Patient: Roosevelt Burris    Procedure(s):  TRACHEOSTOMY    Diagnosis: Respiratory failure (H) [J96.90]  Diagnosis Additional Information: No value filed.    Anesthesia Type:   General     Note:  Airway :Tracheostomy  Patient transferred to:ICU  Comments: To ICU: 02 ambu to ETT, 100% 02, monitors on, VSS, Sp02 96%  Report to RNICU Handoff: Call for PAUSE to initiate/utilize ICU HANDOFF, Identified Patient, Identified Responsible Provider, Reviewed the Pertinent Medical History, Discussed Surgical Course, Reviewed Intra-OP Anesthesia Management and Issues during Anesthesia, Set Expectations for Post Procedure Period and Allowed Opportunity for Questions and Acknowledgement of Understanding      Vitals: (Last set prior to Anesthesia Care Transfer)    CRNA VITALS  11/19/2020 1401 - 11/19/2020 1445      11/19/2020             Resp Rate (observed):  10    Resp Rate (set):  10                Electronically Signed By: TAY Araujo CRNA  November 19, 2020  2:45 PM

## 2020-11-19 NOTE — ANESTHESIA POSTPROCEDURE EVALUATION
Patient: Roosevelt Burris    Procedure(s):  TRACHEOSTOMY    Diagnosis:Respiratory failure (H) [J96.90]  Diagnosis Additional Information: No value filed.    Anesthesia Type:  General    Note:  Anesthesia Post Evaluation    Patient location during evaluation: ICU  Patient participation: Unable to evaluate secondary to administered sedation  Level of consciousness: responsive to noxious stimuli  Pain management: adequate  Airway patency: patent  Cardiovascular status: acceptable  Respiratory status: acceptable (Tracheostomy)  Hydration status: acceptable  PONV: none     Anesthetic complications: None    Comments: No anesthetic complications noted.         Last vitals:  Vitals:    11/19/20 1300 11/19/20 1500 11/19/20 1600   BP: (!) 140/80 120/86 (!) 151/93   Pulse: 104 90 113   Resp: 20 21 22   Temp:   36.8  C (98.2  F)   SpO2: 96% 93% 91%         Electronically Signed By: Sandepe Villavicencio DO, DO  November 19, 2020  5:32 PM

## 2020-11-20 ENCOUNTER — APPOINTMENT (OUTPATIENT)
Dept: GENERAL RADIOLOGY | Facility: CLINIC | Age: 60
DRG: 003 | End: 2020-11-20
Attending: INTERNAL MEDICINE
Payer: COMMERCIAL

## 2020-11-20 ENCOUNTER — ANESTHESIA (OUTPATIENT)
Dept: SURGERY | Facility: CLINIC | Age: 60
DRG: 003 | End: 2020-11-20
Payer: COMMERCIAL

## 2020-11-20 ENCOUNTER — ANESTHESIA EVENT (OUTPATIENT)
Dept: SURGERY | Facility: CLINIC | Age: 60
DRG: 003 | End: 2020-11-20
Payer: COMMERCIAL

## 2020-11-20 LAB
ANION GAP SERPL CALCULATED.3IONS-SCNC: 2 MMOL/L (ref 3–14)
BUN SERPL-MCNC: 38 MG/DL (ref 7–30)
CALCIUM SERPL-MCNC: 7.6 MG/DL (ref 8.5–10.1)
CHLORIDE SERPL-SCNC: 104 MMOL/L (ref 94–109)
CO2 SERPL-SCNC: 34 MMOL/L (ref 20–32)
CREAT SERPL-MCNC: 0.68 MG/DL (ref 0.66–1.25)
CV2 IGG SER QL IB: NEGATIVE
ERYTHROCYTE [DISTWIDTH] IN BLOOD BY AUTOMATED COUNT: 15.1 % (ref 10–15)
GFR SERPL CREATININE-BSD FRML MDRD: >90 ML/MIN/{1.73_M2}
GLUCOSE BLDC GLUCOMTR-MCNC: 157 MG/DL (ref 70–99)
GLUCOSE BLDC GLUCOMTR-MCNC: 165 MG/DL (ref 70–99)
GLUCOSE BLDC GLUCOMTR-MCNC: 172 MG/DL (ref 70–99)
GLUCOSE BLDC GLUCOMTR-MCNC: 178 MG/DL (ref 70–99)
GLUCOSE SERPL-MCNC: 185 MG/DL (ref 70–99)
GLUCOSE SERPL-MCNC: 186 MG/DL (ref 70–99)
HCT VFR BLD AUTO: 35.4 % (ref 40–53)
HGB BLD-MCNC: 11.7 G/DL (ref 13.3–17.7)
HGB BLD-MCNC: 12 G/DL (ref 13.3–17.7)
MCH RBC QN AUTO: 29.8 PG (ref 26.5–33)
MCHC RBC AUTO-ENTMCNC: 33.1 G/DL (ref 31.5–36.5)
MCV RBC AUTO: 90 FL (ref 78–100)
PLATELET # BLD AUTO: 121 10E9/L (ref 150–450)
POTASSIUM SERPL-SCNC: 3.5 MMOL/L (ref 3.4–5.3)
RBC # BLD AUTO: 3.92 10E12/L (ref 4.4–5.9)
SODIUM SERPL-SCNC: 140 MMOL/L (ref 133–144)
WBC # BLD AUTO: 16.9 10E9/L (ref 4–11)

## 2020-11-20 PROCEDURE — 250N000012 HC RX MED GY IP 250 OP 636 PS 637: Performed by: STUDENT IN AN ORGANIZED HEALTH CARE EDUCATION/TRAINING PROGRAM

## 2020-11-20 PROCEDURE — 250N000013 HC RX MED GY IP 250 OP 250 PS 637: Performed by: INTERNAL MEDICINE

## 2020-11-20 PROCEDURE — 999N000009 HC STATISTIC AIRWAY CARE

## 2020-11-20 PROCEDURE — 250N000013 HC RX MED GY IP 250 OP 250 PS 637: Performed by: PHYSICIAN ASSISTANT

## 2020-11-20 PROCEDURE — 250N000011 HC RX IP 250 OP 636: Performed by: INTERNAL MEDICINE

## 2020-11-20 PROCEDURE — 99291 CRITICAL CARE FIRST HOUR: CPT | Mod: GC | Performed by: INTERNAL MEDICINE

## 2020-11-20 PROCEDURE — 250N000003 HC SEVOFLURANE, EA 15 MIN: Performed by: THORACIC SURGERY (CARDIOTHORACIC VASCULAR SURGERY)

## 2020-11-20 PROCEDURE — C9113 INJ PANTOPRAZOLE SODIUM, VIA: HCPCS | Performed by: INTERNAL MEDICINE

## 2020-11-20 PROCEDURE — 99291 CRITICAL CARE FIRST HOUR: CPT | Performed by: PSYCHIATRY & NEUROLOGY

## 2020-11-20 PROCEDURE — 200N000001 HC R&B ICU

## 2020-11-20 PROCEDURE — 85018 HEMOGLOBIN: CPT | Performed by: INTERNAL MEDICINE

## 2020-11-20 PROCEDURE — 250N000011 HC RX IP 250 OP 636: Performed by: PHYSICIAN ASSISTANT

## 2020-11-20 PROCEDURE — 999N001017 HC STATISTIC GLUCOSE BY METER IP

## 2020-11-20 PROCEDURE — 370N000002 HC ANESTHESIA TECHNICAL FEE, EACH ADDTL 15 MIN: Performed by: THORACIC SURGERY (CARDIOTHORACIC VASCULAR SURGERY)

## 2020-11-20 PROCEDURE — 272N000078 HC NUTRITION PRODUCT INTERMEDIATE LITER

## 2020-11-20 PROCEDURE — 94003 VENT MGMT INPAT SUBQ DAY: CPT

## 2020-11-20 PROCEDURE — 250N000009 HC RX 250: Performed by: THORACIC SURGERY (CARDIOTHORACIC VASCULAR SURGERY)

## 2020-11-20 PROCEDURE — 258N000003 HC RX IP 258 OP 636: Performed by: INTERNAL MEDICINE

## 2020-11-20 PROCEDURE — 250N000011 HC RX IP 250 OP 636: Performed by: STUDENT IN AN ORGANIZED HEALTH CARE EDUCATION/TRAINING PROGRAM

## 2020-11-20 PROCEDURE — 360N000020 HC SURGERY LEVEL 3 1ST 30 MIN: Performed by: THORACIC SURGERY (CARDIOTHORACIC VASCULAR SURGERY)

## 2020-11-20 PROCEDURE — 82947 ASSAY GLUCOSE BLOOD QUANT: CPT | Performed by: INTERNAL MEDICINE

## 2020-11-20 PROCEDURE — 250N000011 HC RX IP 250 OP 636: Performed by: NURSE ANESTHETIST, CERTIFIED REGISTERED

## 2020-11-20 PROCEDURE — 999N000157 HC STATISTIC RCP TIME EA 10 MIN

## 2020-11-20 PROCEDURE — 258N000003 HC RX IP 258 OP 636: Performed by: NURSE ANESTHETIST, CERTIFIED REGISTERED

## 2020-11-20 PROCEDURE — 360N000021 HC SURGERY LEVEL 3 EA 15 ADDTL MIN: Performed by: THORACIC SURGERY (CARDIOTHORACIC VASCULAR SURGERY)

## 2020-11-20 PROCEDURE — 82784 ASSAY IGA/IGD/IGG/IGM EACH: CPT | Performed by: STUDENT IN AN ORGANIZED HEALTH CARE EDUCATION/TRAINING PROGRAM

## 2020-11-20 PROCEDURE — 272N000001 HC OR GENERAL SUPPLY STERILE: Performed by: THORACIC SURGERY (CARDIOTHORACIC VASCULAR SURGERY)

## 2020-11-20 PROCEDURE — 85027 COMPLETE CBC AUTOMATED: CPT | Performed by: INTERNAL MEDICINE

## 2020-11-20 PROCEDURE — 250N000009 HC RX 250: Performed by: NURSE ANESTHETIST, CERTIFIED REGISTERED

## 2020-11-20 PROCEDURE — 370N000001 HC ANESTHESIA TECHNICAL FEE, 1ST 30 MIN: Performed by: THORACIC SURGERY (CARDIOTHORACIC VASCULAR SURGERY)

## 2020-11-20 PROCEDURE — 999N000065 XR ABDOMEN PORT 1 VW

## 2020-11-20 PROCEDURE — 30243S1 TRANSFUSION OF NONAUTOLOGOUS GLOBULIN INTO CENTRAL VEIN, PERCUTANEOUS APPROACH: ICD-10-PCS | Performed by: PSYCHIATRY & NEUROLOGY

## 2020-11-20 PROCEDURE — 80048 BASIC METABOLIC PNL TOTAL CA: CPT | Performed by: INTERNAL MEDICINE

## 2020-11-20 PROCEDURE — 0BC68ZZ EXTIRPATION OF MATTER FROM RIGHT LOWER LOBE BRONCHUS, VIA NATURAL OR ARTIFICIAL OPENING ENDOSCOPIC: ICD-10-PCS | Performed by: THORACIC SURGERY (CARDIOTHORACIC VASCULAR SURGERY)

## 2020-11-20 PROCEDURE — 250N000009 HC RX 250: Performed by: INTERNAL MEDICINE

## 2020-11-20 PROCEDURE — 0BW1XFZ REVISION OF TRACHEOSTOMY DEVICE IN TRACHEA, EXTERNAL APPROACH: ICD-10-PCS | Performed by: THORACIC SURGERY (CARDIOTHORACIC VASCULAR SURGERY)

## 2020-11-20 PROCEDURE — 0B518ZZ DESTRUCTION OF TRACHEA, VIA NATURAL OR ARTIFICIAL OPENING ENDOSCOPIC: ICD-10-PCS | Performed by: THORACIC SURGERY (CARDIOTHORACIC VASCULAR SURGERY)

## 2020-11-20 RX ORDER — ACETAMINOPHEN 325 MG/1
650 TABLET ORAL
Status: DISCONTINUED | OUTPATIENT
Start: 2020-11-20 | End: 2020-11-23

## 2020-11-20 RX ORDER — DIPHENHYDRAMINE HYDROCHLORIDE 50 MG/ML
50 INJECTION INTRAMUSCULAR; INTRAVENOUS
Status: DISCONTINUED | OUTPATIENT
Start: 2020-11-20 | End: 2020-11-23

## 2020-11-20 RX ORDER — NOREPINEPHRINE BITARTRATE 0.06 MG/ML
INJECTION, SOLUTION INTRAVENOUS
Status: DISCONTINUED
Start: 2020-11-20 | End: 2020-11-20 | Stop reason: HOSPADM

## 2020-11-20 RX ORDER — PROPOFOL 10 MG/ML
5-75 INJECTION, EMULSION INTRAVENOUS CONTINUOUS
Status: DISCONTINUED | OUTPATIENT
Start: 2020-11-20 | End: 2020-11-24

## 2020-11-20 RX ORDER — ACETAMINOPHEN 325 MG/1
650 TABLET ORAL EVERY 24 HOURS
Status: DISCONTINUED | OUTPATIENT
Start: 2020-11-20 | End: 2020-11-23

## 2020-11-20 RX ORDER — PROPOFOL 10 MG/ML
INJECTION, EMULSION INTRAVENOUS PRN
Status: DISCONTINUED | OUTPATIENT
Start: 2020-11-20 | End: 2020-11-20

## 2020-11-20 RX ORDER — HALOPERIDOL 5 MG/ML
5 INJECTION INTRAMUSCULAR ONCE
Status: COMPLETED | OUTPATIENT
Start: 2020-11-20 | End: 2020-11-23

## 2020-11-20 RX ORDER — NOREPINEPHRINE BITARTRATE 0.06 MG/ML
0.03-0.4 INJECTION, SOLUTION INTRAVENOUS CONTINUOUS
Status: DISCONTINUED | OUTPATIENT
Start: 2020-11-20 | End: 2020-11-24

## 2020-11-20 RX ORDER — MIDAZOLAM (PF) 1 MG/ML IN 0.9 % SODIUM CHLORIDE INTRAVENOUS SOLUTION
.5-3 CONTINUOUS
Status: DISCONTINUED | OUTPATIENT
Start: 2020-11-20 | End: 2020-11-24

## 2020-11-20 RX ORDER — MAGNESIUM HYDROXIDE 1200 MG/15ML
LIQUID ORAL PRN
Status: DISCONTINUED | OUTPATIENT
Start: 2020-11-20 | End: 2020-11-20 | Stop reason: HOSPADM

## 2020-11-20 RX ORDER — MICONAZOLE NITRATE 20 MG/G
CREAM TOPICAL
Status: DISCONTINUED | OUTPATIENT
Start: 2020-11-20 | End: 2020-12-05 | Stop reason: HOSPADM

## 2020-11-20 RX ORDER — METHYLPREDNISOLONE SODIUM SUCCINATE 125 MG/2ML
125 INJECTION, POWDER, LYOPHILIZED, FOR SOLUTION INTRAMUSCULAR; INTRAVENOUS
Status: DISCONTINUED | OUTPATIENT
Start: 2020-11-20 | End: 2020-11-23

## 2020-11-20 RX ORDER — DIPHENHYDRAMINE HCL 25 MG
50 CAPSULE ORAL
Status: DISCONTINUED | OUTPATIENT
Start: 2020-11-20 | End: 2020-11-23

## 2020-11-20 RX ORDER — DIPHENHYDRAMINE HYDROCHLORIDE 50 MG/ML
50 INJECTION INTRAMUSCULAR; INTRAVENOUS
Status: COMPLETED | OUTPATIENT
Start: 2020-11-20 | End: 2020-11-20

## 2020-11-20 RX ORDER — HALOPERIDOL 5 MG/ML
5 INJECTION INTRAMUSCULAR ONCE
Status: COMPLETED | OUTPATIENT
Start: 2020-11-20 | End: 2020-11-20

## 2020-11-20 RX ORDER — DIPHENHYDRAMINE HCL 25 MG
50 CAPSULE ORAL EVERY 24 HOURS
Status: DISCONTINUED | OUTPATIENT
Start: 2020-11-20 | End: 2020-11-23

## 2020-11-20 RX ORDER — EPHEDRINE SULFATE 50 MG/ML
INJECTION, SOLUTION INTRAMUSCULAR; INTRAVENOUS; SUBCUTANEOUS PRN
Status: DISCONTINUED | OUTPATIENT
Start: 2020-11-20 | End: 2020-11-20

## 2020-11-20 RX ORDER — ACETAMINOPHEN 160 MG
TABLET,DISINTEGRATING ORAL EVERY 8 HOURS PRN
Status: DISCONTINUED | OUTPATIENT
Start: 2020-11-20 | End: 2020-12-05 | Stop reason: HOSPADM

## 2020-11-20 RX ORDER — DIPHENHYDRAMINE HYDROCHLORIDE 50 MG/ML
50 INJECTION INTRAMUSCULAR; INTRAVENOUS EVERY 24 HOURS
Status: DISCONTINUED | OUTPATIENT
Start: 2020-11-20 | End: 2020-11-23

## 2020-11-20 RX ADMIN — NYSTATIN 500000 UNITS: 100000 SUSPENSION ORAL at 12:46

## 2020-11-20 RX ADMIN — NYSTATIN 500000 UNITS: 100000 SUSPENSION ORAL at 19:03

## 2020-11-20 RX ADMIN — DIPHENHYDRAMINE HYDROCHLORIDE 50 MG: 50 INJECTION, SOLUTION INTRAMUSCULAR; INTRAVENOUS at 21:13

## 2020-11-20 RX ADMIN — PHENYLEPHRINE HYDROCHLORIDE 200 MCG: 10 INJECTION INTRAVENOUS at 14:47

## 2020-11-20 RX ADMIN — ACETAMINOPHEN 650 MG: 325 TABLET, FILM COATED ORAL at 20:05

## 2020-11-20 RX ADMIN — SODIUM CHLORIDE: 9 INJECTION, SOLUTION INTRAVENOUS at 05:40

## 2020-11-20 RX ADMIN — CHLORHEXIDINE GLUCONATE 0.12% ORAL RINSE 15 ML: 1.2 LIQUID ORAL at 19:12

## 2020-11-20 RX ADMIN — CARBOXYMETHYLCELLULOSE SODIUM 1 DROP: 5 SOLUTION/ DROPS OPHTHALMIC at 13:32

## 2020-11-20 RX ADMIN — SODIUM CHLORIDE, POTASSIUM CHLORIDE, SODIUM LACTATE AND CALCIUM CHLORIDE 250 ML: 600; 310; 30; 20 INJECTION, SOLUTION INTRAVENOUS at 05:41

## 2020-11-20 RX ADMIN — HALOPERIDOL LACTATE 5 MG: 5 INJECTION, SOLUTION INTRAMUSCULAR at 11:42

## 2020-11-20 RX ADMIN — PANTOPRAZOLE SODIUM 40 MG: 40 INJECTION, POWDER, FOR SOLUTION INTRAVENOUS at 08:33

## 2020-11-20 RX ADMIN — PREDNISONE 60 MG: 20 TABLET ORAL at 08:33

## 2020-11-20 RX ADMIN — HUMAN IMMUNOGLOBULIN G 30 G: 20 LIQUID INTRAVENOUS at 21:18

## 2020-11-20 RX ADMIN — PHENYLEPHRINE HYDROCHLORIDE 200 MCG: 10 INJECTION INTRAVENOUS at 14:28

## 2020-11-20 RX ADMIN — HEPARIN SODIUM 5000 UNITS: 5000 INJECTION, SOLUTION INTRAVENOUS; SUBCUTANEOUS at 20:02

## 2020-11-20 RX ADMIN — MIDAZOLAM 2 MG: 1 INJECTION INTRAMUSCULAR; INTRAVENOUS at 14:15

## 2020-11-20 RX ADMIN — QUETIAPINE 25 MG: 25 TABLET ORAL at 08:33

## 2020-11-20 RX ADMIN — PHENYLEPHRINE HYDROCHLORIDE 200 MCG: 10 INJECTION INTRAVENOUS at 14:15

## 2020-11-20 RX ADMIN — Medication 10 MG: at 14:15

## 2020-11-20 RX ADMIN — PHENYLEPHRINE HYDROCHLORIDE 200 MCG: 10 INJECTION INTRAVENOUS at 14:40

## 2020-11-20 RX ADMIN — MIDAZOLAM (PF) 1 MG/ML IN 0.9 % SODIUM CHLORIDE INTRAVENOUS SOLUTION 5 MG/HR: at 04:28

## 2020-11-20 RX ADMIN — Medication 0.03 MCG/KG/MIN: at 06:41

## 2020-11-20 RX ADMIN — QUETIAPINE 25 MG: 25 TABLET ORAL at 20:06

## 2020-11-20 RX ADMIN — SULFAMETHOXAZOLE AND TRIMETHOPRIM 80 MG: 200; 40 SUSPENSION ORAL at 08:33

## 2020-11-20 RX ADMIN — PROPOFOL 40 MCG/KG/MIN: 10 INJECTION, EMULSION INTRAVENOUS at 19:03

## 2020-11-20 RX ADMIN — CARBOXYMETHYLCELLULOSE SODIUM 1 DROP: 5 SOLUTION/ DROPS OPHTHALMIC at 10:17

## 2020-11-20 RX ADMIN — CARBOXYMETHYLCELLULOSE SODIUM 1 DROP: 5 SOLUTION/ DROPS OPHTHALMIC at 19:02

## 2020-11-20 RX ADMIN — HEPARIN SODIUM 5000 UNITS: 5000 INJECTION, SOLUTION INTRAVENOUS; SUBCUTANEOUS at 04:33

## 2020-11-20 RX ADMIN — Medication 5 MG: at 14:40

## 2020-11-20 RX ADMIN — NYSTATIN 500000 UNITS: 100000 SUSPENSION ORAL at 08:33

## 2020-11-20 RX ADMIN — CARBOXYMETHYLCELLULOSE SODIUM 1 DROP: 5 SOLUTION/ DROPS OPHTHALMIC at 08:18

## 2020-11-20 RX ADMIN — CHLORHEXIDINE GLUCONATE 0.12% ORAL RINSE 15 ML: 1.2 LIQUID ORAL at 08:09

## 2020-11-20 RX ADMIN — PROPOFOL 5 MCG/KG/MIN: 10 INJECTION, EMULSION INTRAVENOUS at 12:52

## 2020-11-20 RX ADMIN — MIDAZOLAM HYDROCHLORIDE 2 MG: 1 INJECTION, SOLUTION INTRAMUSCULAR; INTRAVENOUS at 13:41

## 2020-11-20 RX ADMIN — PROPOFOL 30 MG: 10 INJECTION, EMULSION INTRAVENOUS at 14:15

## 2020-11-20 RX ADMIN — CARBOXYMETHYLCELLULOSE SODIUM 1 DROP: 5 SOLUTION/ DROPS OPHTHALMIC at 05:16

## 2020-11-20 RX ADMIN — CARBOXYMETHYLCELLULOSE SODIUM 1 DROP: 5 SOLUTION/ DROPS OPHTHALMIC at 11:51

## 2020-11-20 RX ADMIN — CARBOXYMETHYLCELLULOSE SODIUM 1 DROP: 5 SOLUTION/ DROPS OPHTHALMIC at 21:05

## 2020-11-20 ASSESSMENT — ACTIVITIES OF DAILY LIVING (ADL)
ADLS_ACUITY_SCORE: 24

## 2020-11-20 ASSESSMENT — MIFFLIN-ST. JEOR: SCORE: 1691.38

## 2020-11-20 NOTE — PROGRESS NOTES
Pt, is becoming more agiatated and pulling on tubes. Pt has bitalteral wrist reastraints. Pt. Restarted on versed as BP mean decreased below 65 with increase in propofol .  Mitt placed on Right hand as pulls on tuibes even with restraints on.

## 2020-11-20 NOTE — PROGRESS NOTES
[facility] ICU RESPIRATORY NOTE           Date of Admission: 11/10/2020     Date of Intubation (most recent): Trached 11/19/20     Reason for Mechanical Ventilation: Resp Failure     Number of Days on Mechanical Ventilation: 11     Met Criteria for Spontaneous Breathing Trial: NO     Reason for No Spontaneous Breathing Trial: PerMD     Significant Events Tonight:  none     Ventilation Mode: CMV/AC  (Continuous Mandatory Ventilation/ Assist Control)  FiO2 (%): 60 %  Rate Set (breaths/minute): 16 breaths/min  Tidal Volume Set (mL): 500 mL  PEEP (cm H2O): 8 cmH2O  Oxygen Concentration (%): 60 %  Resp: 16    Recent Labs   Lab 11/19/20  0011 11/18/20  1030 11/18/20  0542 11/17/20  1619 11/17/20  1330   PH 7.45 7.46* 7.45 7.32* 7.31*   PCO2 56* 49* 51* 63* 65*   PO2 89 143* 60* 82 74*   HCO3 39* 34* 35* 33* 33*   O2PER 60% 100%  --  100% 100%   Plan: Continue full vent support tonight

## 2020-11-20 NOTE — PROGRESS NOTES
I reviewed his chart. Completed 5/5 PLEX on 11/19. Neurology is not planning to continue PLEX. So please remove temp CVC per ICU team.   Please call us back if you need our assistance.   I am signing off.  Thank you

## 2020-11-20 NOTE — ANESTHESIA PREPROCEDURE EVALUATION
Anesthesia Pre-Procedure Evaluation    Patient: Roosevelt Burris   MRN: [id number] : 1960          Preoperative Diagnosis: Respiratory failure (H) [J96.90]    Procedure(s):  TRACHEOSTOMY    Past Medical History:   Diagnosis Date     Myasthenia gravis with exacerbation (H) 2020    EMG confirmed, antibodies pending     Past Surgical History:   Procedure Laterality Date     TRACHEOSTOMY N/A 2020    Procedure: TRACHEOSTOMY;  Surgeon: Jarret Junior MD;  Location:  OR       Anesthesia Evaluation     . Pt has had prior anesthetic.     No history of anesthetic complications          ROS/MED HX    ENT/Pulmonary: Comment: Aspiration secondary to bulbar paralysis    Bleeding tracheostomy    (+), . Other pulmonary disease acute respiratory failure, vented since 11/10/20.   (-) sleep apnea   Neurologic: Comment: Myasthenia gravis in crisis  Agitated    (+)other neuro TBI with dysarthria and hemiparesis. Generalized weakness and resp distress on admission from suspected Myesthenia Gravis with acute exacerbation.     Cardiovascular: Comment: Transient Hypotension, on levophed gtt, resolved       (-) hypertension   METS/Exercise Tolerance:     Hematologic:  - neg hematologic  ROS       Musculoskeletal:  - neg musculoskeletal ROS       GI/Hepatic:        (-) GERD   Renal/Genitourinary:  - ROS Renal section negative       Endo:  - neg endo ROS       Psychiatric:         Infectious Disease:  - neg infectious disease ROS       Malignancy:         Other:                            Physical Exam  Normal systems: dental    Airway   Comment: Tracheostomy in place    Dental     Cardiovascular   Rhythm and rate: regular      Pulmonary (+) decreased breath sounds               Lab Results   Component Value Date    WBC 16.9 (H) 2020    HGB 11.7 (L) 2020    HCT 35.4 (L) 2020     (L) 2020     2020    POTASSIUM 3.5 2020    CHLORIDE 104 2020    CO2 34 (H)  "11/20/2020    BUN 38 (H) 11/20/2020    CR 0.68 11/20/2020     (H) 11/20/2020    JESSICA 7.6 (L) 11/20/2020    PHOS 2.9 11/16/2020    MAG 2.6 (H) 11/16/2020    ALBUMIN 3.3 (L) 11/15/2020    PROTTOTAL 4.9 (L) 11/15/2020    ALT 34 11/15/2020    AST 16 11/15/2020    ALKPHOS 37 (L) 11/15/2020    BILITOTAL 0.2 11/15/2020    INR 1.20 (H) 11/17/2020    FIBR 97 (LL) 11/17/2020    TSH 0.34 (L) 11/13/2020       Preop Vitals  BP Readings from Last 3 Encounters:   11/20/20 (!) 160/92    Pulse Readings from Last 3 Encounters:   11/20/20 125      Resp Readings from Last 3 Encounters:   11/20/20 24    SpO2 Readings from Last 3 Encounters:   11/20/20 96%      Temp Readings from Last 1 Encounters:   11/20/20 36.9  C (98.5  F) (Axillary)    Ht Readings from Last 1 Encounters:   11/19/20 1.753 m (5' 9\")      Wt Readings from Last 1 Encounters:   11/20/20 88.6 kg (195 lb 5.2 oz)    Estimated body mass index is 28.84 kg/m  as calculated from the following:    Height as of 11/19/20: 1.753 m (5' 9\").    Weight as of an earlier encounter on 11/20/20: 88.6 kg (195 lb 5.2 oz).       Anesthesia Plan      History & Physical Review  History and physical reviewed and following examination; no interval change.No muscle relaxant    ASA Status:  4 .    NPO Status:  > 8 hours    Plan for General (ETT in situ) with Intravenous and Propofol induction. Maintenance will be TIVA.    PONV prophylaxis:  Ondansetron (or other 5HT-3)         Postoperative Care  Postoperative pain management:  IV analgesics and Multi-modal analgesia.      Consents                   Sarahi Christensen MD  "

## 2020-11-20 NOTE — PROVIDER NOTIFICATION
Paged Dr Junior regarding copious bleeding from new trach site. Per MD, re-dress site and re-page if bleeding still significant.    Addendum:  Continued bleeding. Pt being taken to OR for revision.

## 2020-11-20 NOTE — PLAN OF CARE
Pt tolerating trach and vent settings. VSS. Follows commands, PERRLA. LR bolus x2 given to keep MAP >65 see MAR. TF restarted with 400 mL flush q4h. Advanced TF, X-ray verified. PRN dilaudid given x3 to help with restlessness. Versed increased to 5. Continue to monitor.

## 2020-11-20 NOTE — ANESTHESIA CARE TRANSFER NOTE
Patient: Roosevelt Burris    Procedure(s):  REVISION  TRACHEOSTOMY. THERAPEUTIC FLEXIBLE BRONCHOSCOPY.  Bronchoscopy flexible    Diagnosis: Bleeding [R58]  Diagnosis Additional Information: No value filed.    Anesthesia Type:   General     Note:  Airway :Tracheostomy  Patient transferred to:ICU  Comments: Patient connected to SpO2, EKG, and blood pressure transport monitors and accompanied by CRNA, anesthesiologist, circulating RN to ICU room. Patient ventilated by anesthesiologist with ambu via ETT with O2 at 10 liters per minute during transport. VSS throughout transport    On arrival to ICU, trach position unchanged, equal, bilateral breath sounds auscultated in ICU room, patient placed on ICU ventilator by respiratory therapist. At anesthesia handoff of care, clinical monitors and alarms on and functioning, report on patient's clinical status given to ICU RN, ICU staff questions answered.ICU Handoff: Call for PAUSE to initiate/utilize ICU HANDOFF, Identified Patient, Identified Responsible Provider, Reviewed the Pertinent Medical History, Discussed Surgical Course, Reviewed Intra-OP Anesthesia Management and Issues during Anesthesia, Set Expectations for Post Procedure Period and Allowed Opportunity for Questions and Acknowledgement of Understanding      Vitals: (Last set prior to Anesthesia Care Transfer)    CRNA VITALS  11/20/2020 1444 - 11/20/2020 1535      11/20/2020             Resp Rate (set):  10                Electronically Signed By: TAY Chavez CRNA  November 20, 2020  3:35 PM

## 2020-11-20 NOTE — PROGRESS NOTES
"  [facility]    Stroke/Neurocritical Care Progress Note    Interval Events  Got trach    Exam off sedation to decide IVIG    Impression  1. 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 Encouraging response to therapy with regard to best bulbar and limb exams over the past few days, but delayed respiratory status response may force the need for trach/PEG    2. Respiratory paralysis- secondary to #1, following daily inspiratory forces/tidal volumes in response to therapy    3. Pain/sedation- to tolerate intubation    4. Lung consolidation- consider aspiration secondary to bulbar weakness/dysphagia and low lung volumes, management per Intensivist    Plan  Completed PLEX x5 rounds  Completed methylprednisolone 500 mg IV q12 x10  Will start IVIg today due to ongoing respiratory muscle weakness, to try to speed/ensure complete recovery  Continue prednisone 60 mg daily per tube, plan to continue ongoing  Daily MIP and tidal volume per RT (trend documented in flowsheets, and in physical exam of this note)  Will start pyridostigmine prior to extubation readiness  Can follow up with Dr. Greenwood of [facility] post-hospital for ongoing management  Remainder of cares per Intensivist    The Stroke/Neurocritical Care Staff is Dr. Delaney.     Vicki Burns MD  Vascular Neurology Fellow  To page me or covering stroke neurology team member, click here: [facility]   Choose \"On Call\" tab at top, then search dropdown box for \"Neurology Adult\", select location, press Enter, then look for stroke/neuro ICU/telestroke.    ______________________________________________________    Medications   Home Meds  Prior to Admission medications    Medication Sig Start Date End Date Taking? " Authorizing Provider   amoxicillin-clavulanate (AUGMENTIN) 875-125 MG tablet Take 1 tablet by mouth 2 times daily X 10 days (started 11/9/2020)   Yes Unknown, Entered By History   HYDROcodone-acetaminophen (NORCO) 5-325 MG tablet Take 1-2 tablets by mouth 2 times daily as needed for severe pain    Yes Unknown, Entered By History   predniSONE (DELTASONE) 20 MG tablet Take 20 mg by mouth 2 times daily X 5 days (started 11/9/2020)   Yes Unknown, Entered By History   tamsulosin (FLOMAX) 0.4 MG capsule Take 0.4 mg by mouth daily   Yes Unknown, Entered By History       Scheduled Meds    [Held by provider] amLODIPine  10 mg Per Feeding Tube Daily     artificial tears ophthalmic solution  1 drop Both Eyes Q2H While awake     chlorhexidine  15 mL Mouth/Throat Q12H     [Held by provider] furosemide  40 mg Intravenous Daily     heparin ANTICOAGULANT  5,000 Units Subcutaneous Q8H     insulin aspart  1-6 Units Subcutaneous Q4H     nystatin  500,000 Units Mouth/Throat 4x Daily     pantoprazole (PROTONIX) IV  40 mg Intravenous Daily     predniSONE  60 mg Oral Daily     QUEtiapine  25 mg Oral BID     sulfamethoxazole-trimethoprim  10 mL Per Feeding Tube Daily       Infusion Meds    HYDROmorphone 0.4 mg/hr (11/19/20 1933)     midazolam 4 mg/hr (11/20/20 0735)     norepinephrine Stopped (11/20/20 0646)     sodium chloride 20 mL/hr at 11/20/20 0540       PRN Meds  acetaminophen **OR** acetaminophen, albuterol, glucose **OR** dextrose **OR** glucagon, fentaNYL, hydrogen peroxide, hydromorphone, labetalol, midazolam, naloxone, ondansetron **OR** ondansetron, senna-docusate **OR** senna-docusate       PHYSICAL EXAMINATION  Temp:  [97.8  F (36.6  C)-98.5  F (36.9  C)] 98.5  F (36.9  C)  Pulse:  [] 70  Resp:  [14-35] 15  BP: ()/() 82/48  FiO2 (%):  [60 %] 60 %  SpO2:  [91 %-98 %] 94 %     Date 11/13 11/14 11/15 11/16 11/17 11/18 11/19 11/20   MIP/NIF -7 -4 -3 -5 -1 -- -- --   Vital Capacity 290 180 240 320 500 -- -- --        General:  patient lying in bed without any acute distress    HEENT:  normocephalic/atraumatic, intubated, right tongue laceration with dried and fresh blood in mouth, edentulous  Cardio:  RRR  Pulmonary:  on mechanical ventilation  Abdomen:  soft, non-tender, non-distended  Extremities:  no edema, peripheral pulses palpable  Skin:  intact, warm/dry      Neurologic  Mental Status:  examined on dilaudid/versed. Eyes open spontaneously. Follows commands intermittently.  Cranial Nerves:  VFF, PERRL, ptosis present again, conjugate rest gaze, improved horizontal eye movements, lower facial musculature obscured by ETT, but now able to fully purse lips around tube,  tongue protrudes, obscured by ETT  Motor: movements still brisk, elbow flexion 4/5, elbow extension 4-/5, finger flexion 5/5, finger extension 4/5. Hip flexion 4/5, knee flexion 4+/5, knee extension 4+/5, ankle dorsiflexion 5/5, ankle plantarflexion 5/5. No fasciculations, twitches, or abnormal movements.  Reflexes:  2+ intact and symmetric reflexes in bilateral biceps, brachioradialis, patellae, achilles, toes downgoing  Sensory:  light touch sensation intact and symmetric throughout upper and lower extremities  Coordination:  not tested  Station/Gait:  not tested    Imaging  I personally reviewed all imaging; relevant findings per HPI.     Lab Results Data   CBC  Recent Labs   Lab 11/20/20 0455 11/19/20  0511 11/18/20  0532 11/17/20  0336   WBC 16.9* 18.3*  --  13.4*   RBC 3.92* 4.42  --  4.82   HGB 11.7* 12.6*  --  14.4   HCT 35.4* 40.2  --  44.3   * 142* 135* 171     Basic Metabolic Panel    Recent Labs   Lab 11/20/20  0455 11/19/20  0511 11/18/20  1400    147* 143   POTASSIUM 3.5 3.6 3.7   CHLORIDE 104 108 104   CO2 34* 37* 34*   BUN 38* 47* 53*   CR 0.68 0.74 0.74   * 111* 274*   JESSICA 7.6* 7.6* 7.6*     Liver Panel  Recent Labs   Lab 11/15/20  0358   PROTTOTAL 4.9*   ALBUMIN 3.3*   BILITOTAL 0.2   ALKPHOS 37*   AST 16   ALT 34      INR    Recent Labs   Lab Test 11/17/20  0400   INR 1.20*      Lipid Profile  No lab results found.  A1C    Recent Labs   Lab Test 11/14/20  0402   A1C 6.0*     Troponin I    Recent Labs   Lab 11/18/20  0800   TROPI 0.030

## 2020-11-20 NOTE — BRIEF OP NOTE
[facility]    Brief Operative Note    Pre-operative diagnosis: Bleeding [R58]  Post-operative diagnosis bleeding tracheostomy    Procedure: Procedure(s):  REVISION  TRACHEOSTOMY, (8 Shiley, cuffed nonfenestrated replaced) THERAPEUTIC FLEXIBLE BRONCHOSCOPY.  Bronchoscopy flexible  Surgeon: Surgeon(s) and Role:     * Jarret Junior MD - Primary     * Isabella Hodges PA-C - Assisting  Anesthesia: General   Estimated blood loss:  10 cc  Drains: None  Specimens: * No specimens in log *  Findings:   Active diffuse bleeding along edges of wound-- addressed with cautery, occlusive stitches and hemostatic agent. No active bleeding at conclusion of procedure. Flex bronch revelead small clots in bronchi which were aspirated. At conclusion of bronch,  airway/bronchi patent  Complications: None.  Implants: * No implants in log *

## 2020-11-20 NOTE — ANESTHESIA POSTPROCEDURE EVALUATION
Patient: Roosevelt Burris    Procedure(s):  REVISION  TRACHEOSTOMY. THERAPEUTIC FLEXIBLE BRONCHOSCOPY.  Bronchoscopy flexible    Diagnosis:Bleeding [R58]  Diagnosis Additional Information: No value filed.    Anesthesia Type:  General    Note:  Anesthesia Post Evaluation    Patient location during evaluation: ICU  Patient participation: Unable to evaluate secondary to administered sedation  Post-procedure mental status: Sedated.  Pain management: unable to assess  Airway patency: patent (Tracheostomy.)  Cardiovascular status: acceptable  Respiratory status: ventilator  Hydration status: acceptable  PONV: unable to assess       Comments: Doing well immediately post-op. BP stable, placed on ventilator. Checkout given to ICU RN. Please call with any questions.        Last vitals:  Vitals:    11/20/20 1330 11/20/20 1400 11/20/20 1410   BP: (!) 139/91 (!) 148/79 111/48   Pulse: 125 116    Resp: 28 21    Temp:      SpO2: 97% 97%          Electronically Signed By: Umer Porter MD  November 20, 2020  3:32 PM
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
## 2020-11-20 NOTE — ANESTHESIA PROCEDURE NOTES
Airway   Date/Time: 11/20/2020 2:24 PM   Patient location during procedure: OR    Staff -   CRNA: Bessie Ramirez APRN CRNA  Performed By: CRNA    Consent for Airway   Urgency: emergent    Indications and Patient Condition  Indications for airway management: tori-procedural  Induction type:inhalationalMask difficulty assessment: 0 - not attempted    Final Airway Details  Final airway type: endotracheal airway  Successful airway:ETT - single  Endotracheal Airway Details   ETT size (mm): 7.0  Cuffed: yes  Successful intubation technique: video laryngoscopy  Grade View of Cords: 1  Adjucts: stylet  Secured with: pink tape  Bite block used: None    Post intubation assessment   Placement verified by: capnometry and equal breath sounds   Number of attempts at approach: 1  Secured with:pink tape  Ease of procedure: easy  Dentition: Intact and Unchanged

## 2020-11-20 NOTE — PLAN OF CARE
Pt continues on vent support, converted to trach today. Sedated with versed, weaning as able, pain managed with dilaudid gtt, restless at times. Moderate dark red bloody resp secretions. TF held d/t NPO for procedure and 1000 mL free water. Excellent output per albarado. Good output per rectal tube. Daughter updated on POC by phone.

## 2020-11-21 LAB
ANION GAP SERPL CALCULATED.3IONS-SCNC: 1 MMOL/L (ref 3–14)
BUN SERPL-MCNC: 34 MG/DL (ref 7–30)
CALCIUM SERPL-MCNC: 7.2 MG/DL (ref 8.5–10.1)
CHLORIDE SERPL-SCNC: 105 MMOL/L (ref 94–109)
CO2 SERPL-SCNC: 33 MMOL/L (ref 20–32)
CREAT SERPL-MCNC: 0.67 MG/DL (ref 0.66–1.25)
ERYTHROCYTE [DISTWIDTH] IN BLOOD BY AUTOMATED COUNT: 15 % (ref 10–15)
GFR SERPL CREATININE-BSD FRML MDRD: >90 ML/MIN/{1.73_M2}
GLUCOSE BLDC GLUCOMTR-MCNC: 128 MG/DL (ref 70–99)
GLUCOSE BLDC GLUCOMTR-MCNC: 132 MG/DL (ref 70–99)
GLUCOSE BLDC GLUCOMTR-MCNC: 145 MG/DL (ref 70–99)
GLUCOSE BLDC GLUCOMTR-MCNC: 152 MG/DL (ref 70–99)
GLUCOSE BLDC GLUCOMTR-MCNC: 153 MG/DL (ref 70–99)
GLUCOSE BLDC GLUCOMTR-MCNC: 155 MG/DL (ref 70–99)
GLUCOSE BLDC GLUCOMTR-MCNC: 209 MG/DL (ref 70–99)
GLUCOSE SERPL-MCNC: 130 MG/DL (ref 70–99)
HCT VFR BLD AUTO: 29.8 % (ref 40–53)
HGB BLD-MCNC: 10 G/DL (ref 13.3–17.7)
Lab: 20.6 NMOL/L
MCH RBC QN AUTO: 30.1 PG (ref 26.5–33)
MCHC RBC AUTO-ENTMCNC: 33.6 G/DL (ref 31.5–36.5)
MCV RBC AUTO: 90 FL (ref 78–100)
PLATELET # BLD AUTO: 118 10E9/L (ref 150–450)
POTASSIUM SERPL-SCNC: 3.7 MMOL/L (ref 3.4–5.3)
RBC # BLD AUTO: 3.32 10E12/L (ref 4.4–5.9)
SODIUM SERPL-SCNC: 139 MMOL/L (ref 133–144)
WBC # BLD AUTO: 11.7 10E9/L (ref 4–11)

## 2020-11-21 PROCEDURE — 99291 CRITICAL CARE FIRST HOUR: CPT | Performed by: PSYCHIATRY & NEUROLOGY

## 2020-11-21 PROCEDURE — 250N000011 HC RX IP 250 OP 636: Performed by: PHYSICIAN ASSISTANT

## 2020-11-21 PROCEDURE — 99291 CRITICAL CARE FIRST HOUR: CPT | Performed by: INTERNAL MEDICINE

## 2020-11-21 PROCEDURE — 250N000011 HC RX IP 250 OP 636

## 2020-11-21 PROCEDURE — 250N000013 HC RX MED GY IP 250 OP 250 PS 637: Performed by: PHYSICIAN ASSISTANT

## 2020-11-21 PROCEDURE — 999N000009 HC STATISTIC AIRWAY CARE

## 2020-11-21 PROCEDURE — 999N001017 HC STATISTIC GLUCOSE BY METER IP

## 2020-11-21 PROCEDURE — C9113 INJ PANTOPRAZOLE SODIUM, VIA: HCPCS | Performed by: PHYSICIAN ASSISTANT

## 2020-11-21 PROCEDURE — 272N000078 HC NUTRITION PRODUCT INTERMEDIATE LITER

## 2020-11-21 PROCEDURE — 80048 BASIC METABOLIC PNL TOTAL CA: CPT | Performed by: PHYSICIAN ASSISTANT

## 2020-11-21 PROCEDURE — 94003 VENT MGMT INPAT SUBQ DAY: CPT

## 2020-11-21 PROCEDURE — 250N000013 HC RX MED GY IP 250 OP 250 PS 637: Performed by: STUDENT IN AN ORGANIZED HEALTH CARE EDUCATION/TRAINING PROGRAM

## 2020-11-21 PROCEDURE — 250N000012 HC RX MED GY IP 250 OP 636 PS 637: Performed by: PHYSICIAN ASSISTANT

## 2020-11-21 PROCEDURE — 258N000003 HC RX IP 258 OP 636: Performed by: PHYSICIAN ASSISTANT

## 2020-11-21 PROCEDURE — 85027 COMPLETE CBC AUTOMATED: CPT | Performed by: PHYSICIAN ASSISTANT

## 2020-11-21 PROCEDURE — 999N000157 HC STATISTIC RCP TIME EA 10 MIN

## 2020-11-21 PROCEDURE — 250N000011 HC RX IP 250 OP 636: Performed by: INTERNAL MEDICINE

## 2020-11-21 PROCEDURE — 250N000011 HC RX IP 250 OP 636: Performed by: STUDENT IN AN ORGANIZED HEALTH CARE EDUCATION/TRAINING PROGRAM

## 2020-11-21 PROCEDURE — 250N000013 HC RX MED GY IP 250 OP 250 PS 637: Performed by: INTERNAL MEDICINE

## 2020-11-21 PROCEDURE — 200N000001 HC R&B ICU

## 2020-11-21 RX ORDER — FUROSEMIDE 10 MG/ML
INJECTION INTRAMUSCULAR; INTRAVENOUS
Status: COMPLETED
Start: 2020-11-21 | End: 2020-11-21

## 2020-11-21 RX ORDER — FUROSEMIDE 10 MG/ML
40 INJECTION INTRAMUSCULAR; INTRAVENOUS ONCE
Status: COMPLETED | OUTPATIENT
Start: 2020-11-21 | End: 2020-11-21

## 2020-11-21 RX ADMIN — CHLORHEXIDINE GLUCONATE 0.12% ORAL RINSE 15 ML: 1.2 LIQUID ORAL at 07:48

## 2020-11-21 RX ADMIN — CHLORHEXIDINE GLUCONATE 0.12% ORAL RINSE 15 ML: 1.2 LIQUID ORAL at 20:55

## 2020-11-21 RX ADMIN — ONDANSETRON 4 MG: 2 INJECTION INTRAMUSCULAR; INTRAVENOUS at 18:56

## 2020-11-21 RX ADMIN — Medication 0.4 MG/HR: at 07:28

## 2020-11-21 RX ADMIN — CARBOXYMETHYLCELLULOSE SODIUM 1 DROP: 5 SOLUTION/ DROPS OPHTHALMIC at 13:53

## 2020-11-21 RX ADMIN — DIPHENHYDRAMINE HYDROCHLORIDE 50 MG: 50 INJECTION, SOLUTION INTRAMUSCULAR; INTRAVENOUS at 20:55

## 2020-11-21 RX ADMIN — PANTOPRAZOLE SODIUM 40 MG: 40 INJECTION, POWDER, FOR SOLUTION INTRAVENOUS at 08:31

## 2020-11-21 RX ADMIN — FUROSEMIDE 40 MG: 10 INJECTION, SOLUTION INTRAVENOUS at 12:43

## 2020-11-21 RX ADMIN — PREDNISONE 60 MG: 20 TABLET ORAL at 08:31

## 2020-11-21 RX ADMIN — MICONAZOLE NITRATE: 20 POWDER TOPICAL at 08:10

## 2020-11-21 RX ADMIN — CARBOXYMETHYLCELLULOSE SODIUM 1 DROP: 5 SOLUTION/ DROPS OPHTHALMIC at 05:31

## 2020-11-21 RX ADMIN — SODIUM CHLORIDE: 9 INJECTION, SOLUTION INTRAVENOUS at 19:13

## 2020-11-21 RX ADMIN — HEPARIN SODIUM 5000 UNITS: 5000 INJECTION, SOLUTION INTRAVENOUS; SUBCUTANEOUS at 12:16

## 2020-11-21 RX ADMIN — SULFAMETHOXAZOLE AND TRIMETHOPRIM 80 MG: 200; 40 SUSPENSION ORAL at 08:31

## 2020-11-21 RX ADMIN — NYSTATIN 500000 UNITS: 100000 SUSPENSION ORAL at 17:30

## 2020-11-21 RX ADMIN — PROPOFOL 40 MCG/KG/MIN: 10 INJECTION, EMULSION INTRAVENOUS at 13:54

## 2020-11-21 RX ADMIN — HEPARIN SODIUM 5000 UNITS: 5000 INJECTION, SOLUTION INTRAVENOUS; SUBCUTANEOUS at 03:43

## 2020-11-21 RX ADMIN — PROPOFOL 20 MCG/KG/MIN: 10 INJECTION, EMULSION INTRAVENOUS at 01:36

## 2020-11-21 RX ADMIN — NYSTATIN 500000 UNITS: 100000 SUSPENSION ORAL at 00:59

## 2020-11-21 RX ADMIN — CARBOXYMETHYLCELLULOSE SODIUM 1 DROP: 5 SOLUTION/ DROPS OPHTHALMIC at 17:30

## 2020-11-21 RX ADMIN — QUETIAPINE 25 MG: 25 TABLET ORAL at 08:31

## 2020-11-21 RX ADMIN — CARBOXYMETHYLCELLULOSE SODIUM 1 DROP: 5 SOLUTION/ DROPS OPHTHALMIC at 10:28

## 2020-11-21 RX ADMIN — HEPARIN SODIUM 5000 UNITS: 5000 INJECTION, SOLUTION INTRAVENOUS; SUBCUTANEOUS at 20:32

## 2020-11-21 RX ADMIN — HUMAN IMMUNOGLOBULIN G 30 G: 20 LIQUID INTRAVENOUS at 21:28

## 2020-11-21 RX ADMIN — NYSTATIN 500000 UNITS: 100000 SUSPENSION ORAL at 12:20

## 2020-11-21 RX ADMIN — PROPOFOL 40 MCG/KG/MIN: 10 INJECTION, EMULSION INTRAVENOUS at 22:36

## 2020-11-21 RX ADMIN — FUROSEMIDE: 10 INJECTION, SOLUTION INTRAVENOUS at 12:48

## 2020-11-21 RX ADMIN — PROPOFOL 30 MCG/KG/MIN: 10 INJECTION, EMULSION INTRAVENOUS at 08:29

## 2020-11-21 RX ADMIN — CARBOXYMETHYLCELLULOSE SODIUM 1 DROP: 5 SOLUTION/ DROPS OPHTHALMIC at 07:37

## 2020-11-21 RX ADMIN — NYSTATIN 500000 UNITS: 100000 SUSPENSION ORAL at 05:32

## 2020-11-21 RX ADMIN — ACETAMINOPHEN 650 MG: 325 TABLET, FILM COATED ORAL at 20:54

## 2020-11-21 RX ADMIN — NYSTATIN 500000 UNITS: 100000 SUSPENSION ORAL at 23:30

## 2020-11-21 RX ADMIN — PROPOFOL 40 MCG/KG/MIN: 10 INJECTION, EMULSION INTRAVENOUS at 18:28

## 2020-11-21 RX ADMIN — CARBOXYMETHYLCELLULOSE SODIUM 1 DROP: 5 SOLUTION/ DROPS OPHTHALMIC at 12:12

## 2020-11-21 RX ADMIN — QUETIAPINE 25 MG: 25 TABLET ORAL at 20:54

## 2020-11-21 ASSESSMENT — ACTIVITIES OF DAILY LIVING (ADL)
ADLS_ACUITY_SCORE: 26
ADLS_ACUITY_SCORE: 24
ADLS_ACUITY_SCORE: 26
ADLS_ACUITY_SCORE: 26

## 2020-11-21 ASSESSMENT — MIFFLIN-ST. JEOR: SCORE: 1710.38

## 2020-11-21 NOTE — PROGRESS NOTES
THORACIC SURGERY      Tracheostomy OK, no bleeding    JENNIFER ALVARADO MD [facility] ONCOLOGY THORACIC SURGERY  CELL:  [phone]  OFFICE: [phone]

## 2020-11-21 NOTE — PROGRESS NOTES
Discussed with Dr. Burns regarding agitation. States pt. Needs versed and dilaudid at this point. Wrist restraints continued\ and mitts placed as pt able to pull self down and pull on tubes. Pt denies pain when asked. No bleeding from trach site.

## 2020-11-21 NOTE — OP NOTE
Procedure Date: 11/20/2020      SURGEON:  Jarret Junior MD      FIRST ASSISTANT:  Isabella Hodges PA-C      PREOPERATIVE DIAGNOSIS:  Bleeding, status post tracheostomy Shiley #8, 11/19/2020.      POSTOPERATIVE DIAGNOSIS:  Bleeding, status post tracheostomy Shiley #8, 11/19/2020.      PROCEDURE:     1.  Revision of tracheostomy.    2.  Hemostasis.   3.  Therapeutic flexible bronchoscopy with aspiration of endobronchial clots.      ANESTHESIA:  General.      INDICATIONS:  A 60-year-old gentleman had a tracheostomy done for respiratory failure secondary to myasthenia gravis, was noticed to have some bleeding.  This afternoon he had increasing bleeding and based on the findings, a revision with hemostasis was indicated.      DESCRIPTION OF PROCEDURE:  The patient was brought to the operating room on the ICU bed.  Under general anesthesia, an endotracheal tube was placed through the vocal cord.  The tracheostomy tube was removed.  The endotracheal tube was advanced above the tracheotomy.  The wound was carefully examined.  There was a diffuse area of oozing, but no obvious clear single site of bleeding.  Some of the raw surfaces were cauterized.  The muscle edges were oversewn with running 3-0 silk as well as the edge of the divided thyroid.  There was some bleeding from the surface of the trachea, which was also cauterized.  The completion of the hemostasis was adequate.  Then, the endotracheal tube was pulled above the tracheotomy.  A Shiley #8 cuffed nonfenestrated tube was advanced without any resistance.  The cuff was inflated and ventilation was re-established.  Ventilation was adequate.  Nu-Knit was placed along the tracheostomy tube.  The incision was closed with interrupted 3-0 nylon suture on the skin along the tracheostomy.  Hemostasis again verified and was excellent.  Then, a therapeutic bronchoscopy was done.  There was some clot obstructing the bronchus intermedius and the right lower lobe  bronchus.  All the clots were aspirated.  At the completion of the bronchoscopy, the airway is clean with no residual clot or obstruction and no mucosal abnormality.  The patient was returned to the intensive care after securing the tracheostomy around the patient's neck.      ESTIMATED BLOOD LOSS:  Minimal.      Isabella Hodges PA-C, was the first assistant during the procedure.  Her role as first assistant was essential and necessary in accomplishing the steps of the procedure as described above, providing exposure and retraction.         JENNIFER ALVARADO MD             D: 2020   T: 2020   MT: GAYLE      Name:     BERNABE RODRIGUEZ   MRN:      [id number]        Account:        [id number]   :      1960           Procedure Date: 2020      Document: [id number]
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
## 2020-11-21 NOTE — PROGRESS NOTES
Neuro- Pt able to sleep 4hrs last night.  Pt alert and confused.  Needs frequent redirection.  Able to follow commands and JIN.  Continue  The need for bilateral soft wrist restraints.    CV- SR to ST   BP map >65.      Resp- L/S are  Clear.  Able to suction small amounts of blood tinged sputum.      GI/-  Tolerating TF.  Urine output 50-100ml / hr.

## 2020-11-21 NOTE — PLAN OF CARE
Pt continues on vent support. Weaned from versed gtt but added propofol gtt d/t increasing agitation/HR. Pain managed with dilaudid gtt. Neuros improving, moving all 4 extremities against gravity, inconsistently following commands. Copious bleeding from trach site, Dr Junior notified and pt sent to OR for revision. Small amounts dark red bloody resp secretions. Good output per albarado. Stooling per rectal tube. Daughter updated on POC by phone.

## 2020-11-21 NOTE — PROGRESS NOTES
"[facility]    Stroke/Neurocritical Care Progress Note    Interval Events  Trach bled, went back to OR for some cautery. Started IVIg course without complication.    Intermittently delirious vs uncooperative due to frustration.    Impression  1. 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 Encouraging response to therapy with regard to best bulbar and limb exams over the past few days, but delayed respiratory status response may force the need for trach/PEG    2. Respiratory paralysis- secondary to #1, following daily inspiratory forces/tidal volumes in response to therapy    3. Pain/sedation- to tolerate intubation    4. Lung consolidation- consider aspiration secondary to bulbar weakness/dysphagia and low lung volumes, management per Intensivist    Plan  Completed PLEX x5 rounds  Completed methylprednisolone 500 mg IV q12 x10  IVIg 0.4 mg/kg IV daily x5 days (2/5)  Continue prednisone 60 mg daily per tube, plan to continue ongoing  Daily MIP and tidal volume as tolerated per RT (trend documented in flowsheets, and in physical exam of this note)  Will start pyridostigmine prior to extubation readiness  Can follow up with Dr. Greenwood of [facility] post-hospital for ongoing management  Remainder of cares per Intensivist    The Stroke/Neurocritical Care Staff is Dr. Delaney.     Vicki Burns MD  Vascular Neurology Fellow  To page me or covering stroke neurology team member, click here: AMCOM   Choose \"On Call\" tab at top, then search dropdown box for \"Neurology Adult\", select location, press Enter, then look for stroke/neuro ICU/telestroke.    ______________________________________________________    Medications   Home Meds  Prior to Admission medications  "   Medication Sig Start Date End Date Taking? Authorizing Provider   amoxicillin-clavulanate (AUGMENTIN) 875-125 MG tablet Take 1 tablet by mouth 2 times daily X 10 days (started 11/9/2020)   Yes Unknown, Entered By History   HYDROcodone-acetaminophen (NORCO) 5-325 MG tablet Take 1-2 tablets by mouth 2 times daily as needed for severe pain    Yes Unknown, Entered By History   predniSONE (DELTASONE) 20 MG tablet Take 20 mg by mouth 2 times daily X 5 days (started 11/9/2020)   Yes Unknown, Entered By History   tamsulosin (FLOMAX) 0.4 MG capsule Take 0.4 mg by mouth daily   Yes Unknown, Entered By History       Scheduled Meds    acetaminophen  650 mg Oral Q24H     [Held by provider] amLODIPine  10 mg Per Feeding Tube Daily     artificial tears ophthalmic solution  1 drop Both Eyes Q2H While awake     chlorhexidine  15 mL Mouth/Throat Q12H     diphenhydrAMINE  50 mg Oral Q24H    Or     diphenhydrAMINE  50 mg Intravenous Q24H     [Held by provider] furosemide  40 mg Intravenous Daily     haloperidol lactate  5 mg Intravenous Once     heparin ANTICOAGULANT  5,000 Units Subcutaneous Q8H     immune globulin - sucrose free  30 g Intravenous Q24H     insulin aspart  1-6 Units Subcutaneous Q4H     nystatin  500,000 Units Mouth/Throat 4x Daily     pantoprazole (PROTONIX) IV  40 mg Intravenous Daily     predniSONE  60 mg Oral Daily     QUEtiapine  25 mg Oral BID     sulfamethoxazole-trimethoprim  10 mL Per Feeding Tube Daily       Infusion Meds    HYDROmorphone 0.4 mg/hr (11/21/20 0728)     midazolam 2 mg/hr (11/20/20 1751)     norepinephrine Stopped (11/20/20 0646)     propofol (DIPRIVAN) infusion 30 mcg/kg/min (11/21/20 0829)     sodium chloride 20 mL/hr at 11/20/20 0540       PRN Meds  acetaminophen **OR** acetaminophen, acetaminophen, albuterol, glucose **OR** dextrose **OR** glucagon, diphenhydrAMINE **OR** diphenhydrAMINE, EPINEPHrine, famotidine, fentaNYL, hydrogen peroxide, hydromorphone, labetalol, methylPREDNISolone,  miconazole, miconazole, midazolam, naloxone, ondansetron **OR** ondansetron, senna-docusate **OR** senna-docusate       PHYSICAL EXAMINATION  Temp:  [97.5  F (36.4  C)-98.5  F (36.9  C)] 98.3  F (36.8  C)  Pulse:  [] 114  Resp:  [] 29  BP: ()/() 221/180  FiO2 (%):  [40 %-60 %] 40 %  SpO2:  [91 %-100 %] 97 %     Date 11/13 11/14 11/15 11/16 11/17 11/18 11/19 11/20 11/21   [facility]/NIF -7 -4 -3 -5 -1 -- -- -- --   Vital Capacity 290 180 240 320 500 -- -- -- --       General:  patient lying in bed without any acute distress    HEENT:  normocephalic/atraumatic, edentulous, trach present  Cardio:  RRR  Pulmonary:  on mechanical ventilation  Abdomen:  soft, non-tender, non-distended  Extremities:  no edema, peripheral pulses palpable  Skin:  intact, warm/dry      Neurologic  Mental Status:  examined on dilaudid/versed. Eyes open spontaneously. Follows commands intermittently.  Cranial Nerves:  VFF, PERRL, ptosis present but mild, conjugate rest gaze, improved horizontal eye movements, face symmetric, weak lip purse  Motor: restless, tapping hands and feet, movements still brisk, elbow flexion 4/5, elbow extension 4-/5, finger flexion 5/5, finger extension 4/5. Hip flexion 4/5, knee flexion 4+/5, knee extension 4+/5, ankle dorsiflexion 5/5, ankle plantarflexion 5/5. No fasciculations, twitches, or abnormal movements.  Reflexes:  2+ intact and symmetric reflexes in bilateral biceps, brachioradialis, patellae, achilles, toes downgoing  Sensory:  light touch sensation intact and symmetric throughout upper and lower extremities  Coordination:  not tested  Station/Gait:  not tested    Imaging  I personally reviewed all imaging; relevant findings per HPI.     Lab Results Data   CBC  Recent Labs   Lab 11/21/20  0400 11/20/20  1620 11/20/20  0455 11/19/20  0511   WBC 11.7*  --  16.9* 18.3*   RBC 3.32*  --  3.92* 4.42   HGB 10.0* 12.0* 11.7* 12.6*   HCT 29.8*  --  35.4* 40.2   *  --  121* 142*     Basic  Metabolic Panel    Recent Labs   Lab 11/21/20  0400 11/20/20  1620 11/20/20  0455 11/19/20  0511     --  140 147*   POTASSIUM 3.7  --  3.5 3.6   CHLORIDE 105  --  104 108   CO2 33*  --  34* 37*   BUN 34*  --  38* 47*   CR 0.67  --  0.68 0.74   * 185* 186* 111*   JESSICA 7.2*  --  7.6* 7.6*     Liver Panel  Recent Labs   Lab 11/15/20  0358   PROTTOTAL 4.9*   ALBUMIN 3.3*   BILITOTAL 0.2   ALKPHOS 37*   AST 16   ALT 34     INR    Recent Labs   Lab Test 11/17/20  0400   INR 1.20*      Lipid Profile  No lab results found.  A1C    Recent Labs   Lab Test 11/14/20  0402   A1C 6.0*     Troponin I    Recent Labs   Lab 11/18/20  0800   TROPI 0.030

## 2020-11-21 NOTE — PROGRESS NOTES
Date of Admission: 11/10/2020     Date of Intubation (most recent): Trached 11/19/20     Reason for Mechanical Ventilation: Resp Failure     Number of Days on Mechanical Ventilation: 11     Met Criteria for Spontaneous Breathing Trial: NO     Reason for No Spontaneous Breathing Trial: PerMD     Significant Events Today; pt was taking to OR for trach check.  Recent Labs   Lab 11/19/20  0011 11/18/20  1030 11/18/20  0542 11/17/20  1619 11/17/20  1330   PH 7.45 7.46* 7.45 7.32* 7.31*   PCO2 56* 49* 51* 63* 65*   PO2 89 143* 60* 82 74*   HCO3 39* 34* 35* 33* 33*   O2PER 60% 100%  --  100% 100%     Ventilation Mode: CMV/AC  (Continuous Mandatory Ventilation/ Assist Control)  FiO2 (%): 60 %  Rate Set (breaths/minute): 16 breaths/min  Tidal Volume Set (mL): 500 mL  PEEP (cm H2O): 8 cmH2O  Oxygen Concentration (%): 60 %  Resp: 16    Plan; continue ventilatory support SBT's daily.

## 2020-11-22 ENCOUNTER — APPOINTMENT (OUTPATIENT)
Dept: GENERAL RADIOLOGY | Facility: CLINIC | Age: 60
DRG: 003 | End: 2020-11-22
Attending: INTERNAL MEDICINE
Payer: COMMERCIAL

## 2020-11-22 ENCOUNTER — APPOINTMENT (OUTPATIENT)
Dept: GENERAL RADIOLOGY | Facility: CLINIC | Age: 60
DRG: 003 | End: 2020-11-22
Attending: ANESTHESIOLOGY
Payer: COMMERCIAL

## 2020-11-22 PROBLEM — B37.0 THRUSH: Status: ACTIVE | Noted: 2020-11-22

## 2020-11-22 PROBLEM — G70.01 MYASTHENIA GRAVIS WITH EXACERBATION (H): Status: ACTIVE | Noted: 2020-11-22

## 2020-11-22 PROBLEM — R41.0 DELIRIUM: Status: ACTIVE | Noted: 2020-11-22

## 2020-11-22 LAB
ANION GAP SERPL CALCULATED.3IONS-SCNC: 2 MMOL/L (ref 3–14)
BUN SERPL-MCNC: 34 MG/DL (ref 7–30)
CALCIUM SERPL-MCNC: 7.4 MG/DL (ref 8.5–10.1)
CHLORIDE SERPL-SCNC: 103 MMOL/L (ref 94–109)
CO2 SERPL-SCNC: 32 MMOL/L (ref 20–32)
CREAT SERPL-MCNC: 0.68 MG/DL (ref 0.66–1.25)
ERYTHROCYTE [DISTWIDTH] IN BLOOD BY AUTOMATED COUNT: 15.2 % (ref 10–15)
GFR SERPL CREATININE-BSD FRML MDRD: >90 ML/MIN/{1.73_M2}
GLUCOSE BLDC GLUCOMTR-MCNC: 112 MG/DL (ref 70–99)
GLUCOSE BLDC GLUCOMTR-MCNC: 125 MG/DL (ref 70–99)
GLUCOSE BLDC GLUCOMTR-MCNC: 146 MG/DL (ref 70–99)
GLUCOSE BLDC GLUCOMTR-MCNC: 161 MG/DL (ref 70–99)
GLUCOSE BLDC GLUCOMTR-MCNC: 177 MG/DL (ref 70–99)
GLUCOSE BLDC GLUCOMTR-MCNC: 77 MG/DL (ref 70–99)
GLUCOSE SERPL-MCNC: 124 MG/DL (ref 70–99)
HCT VFR BLD AUTO: 31.9 % (ref 40–53)
HGB BLD-MCNC: 10.7 G/DL (ref 13.3–17.7)
MCH RBC QN AUTO: 30.1 PG (ref 26.5–33)
MCHC RBC AUTO-ENTMCNC: 33.5 G/DL (ref 31.5–36.5)
MCV RBC AUTO: 90 FL (ref 78–100)
PLATELET # BLD AUTO: 156 10E9/L (ref 150–450)
POTASSIUM SERPL-SCNC: 3.9 MMOL/L (ref 3.4–5.3)
RBC # BLD AUTO: 3.55 10E12/L (ref 4.4–5.9)
SODIUM SERPL-SCNC: 137 MMOL/L (ref 133–144)
WBC # BLD AUTO: 14.5 10E9/L (ref 4–11)

## 2020-11-22 PROCEDURE — 999N000065 XR ABDOMEN PORT 1 VW

## 2020-11-22 PROCEDURE — C9113 INJ PANTOPRAZOLE SODIUM, VIA: HCPCS | Performed by: PHYSICIAN ASSISTANT

## 2020-11-22 PROCEDURE — 250N000009 HC RX 250: Performed by: STUDENT IN AN ORGANIZED HEALTH CARE EDUCATION/TRAINING PROGRAM

## 2020-11-22 PROCEDURE — 250N000013 HC RX MED GY IP 250 OP 250 PS 637: Performed by: STUDENT IN AN ORGANIZED HEALTH CARE EDUCATION/TRAINING PROGRAM

## 2020-11-22 PROCEDURE — 99291 CRITICAL CARE FIRST HOUR: CPT | Performed by: INTERNAL MEDICINE

## 2020-11-22 PROCEDURE — 250N000013 HC RX MED GY IP 250 OP 250 PS 637: Performed by: PHYSICIAN ASSISTANT

## 2020-11-22 PROCEDURE — 250N000009 HC RX 250: Performed by: PHYSICIAN ASSISTANT

## 2020-11-22 PROCEDURE — 250N000011 HC RX IP 250 OP 636: Performed by: STUDENT IN AN ORGANIZED HEALTH CARE EDUCATION/TRAINING PROGRAM

## 2020-11-22 PROCEDURE — 250N000011 HC RX IP 250 OP 636: Performed by: INTERNAL MEDICINE

## 2020-11-22 PROCEDURE — 250N000011 HC RX IP 250 OP 636: Performed by: PHYSICIAN ASSISTANT

## 2020-11-22 PROCEDURE — 999N001017 HC STATISTIC GLUCOSE BY METER IP

## 2020-11-22 PROCEDURE — 999N000157 HC STATISTIC RCP TIME EA 10 MIN

## 2020-11-22 PROCEDURE — 85027 COMPLETE CBC AUTOMATED: CPT | Performed by: PHYSICIAN ASSISTANT

## 2020-11-22 PROCEDURE — 99291 CRITICAL CARE FIRST HOUR: CPT | Performed by: PSYCHIATRY & NEUROLOGY

## 2020-11-22 PROCEDURE — 94003 VENT MGMT INPAT SUBQ DAY: CPT

## 2020-11-22 PROCEDURE — 272N000078 HC NUTRITION PRODUCT INTERMEDIATE LITER

## 2020-11-22 PROCEDURE — 200N000001 HC R&B ICU

## 2020-11-22 PROCEDURE — 999N000009 HC STATISTIC AIRWAY CARE

## 2020-11-22 PROCEDURE — 250N000012 HC RX MED GY IP 250 OP 636 PS 637: Performed by: PHYSICIAN ASSISTANT

## 2020-11-22 PROCEDURE — 80048 BASIC METABOLIC PNL TOTAL CA: CPT | Performed by: PHYSICIAN ASSISTANT

## 2020-11-22 RX ORDER — FUROSEMIDE 10 MG/ML
40 INJECTION INTRAMUSCULAR; INTRAVENOUS DAILY
Status: COMPLETED | OUTPATIENT
Start: 2020-11-22 | End: 2020-11-25

## 2020-11-22 RX ADMIN — CARBOXYMETHYLCELLULOSE SODIUM 1 DROP: 5 SOLUTION/ DROPS OPHTHALMIC at 18:11

## 2020-11-22 RX ADMIN — MIDAZOLAM 0.5 MG: 1 INJECTION INTRAMUSCULAR; INTRAVENOUS at 21:15

## 2020-11-22 RX ADMIN — QUETIAPINE 25 MG: 25 TABLET ORAL at 09:39

## 2020-11-22 RX ADMIN — MIDAZOLAM (PF) 1 MG/ML IN 0.9 % SODIUM CHLORIDE INTRAVENOUS SOLUTION 1 MG/HR: at 04:08

## 2020-11-22 RX ADMIN — CARBOXYMETHYLCELLULOSE SODIUM 1 DROP: 5 SOLUTION/ DROPS OPHTHALMIC at 23:46

## 2020-11-22 RX ADMIN — CARBOXYMETHYLCELLULOSE SODIUM 1 DROP: 5 SOLUTION/ DROPS OPHTHALMIC at 08:16

## 2020-11-22 RX ADMIN — QUETIAPINE 25 MG: 25 TABLET ORAL at 22:30

## 2020-11-22 RX ADMIN — CHLORHEXIDINE GLUCONATE 0.12% ORAL RINSE 15 ML: 1.2 LIQUID ORAL at 19:57

## 2020-11-22 RX ADMIN — MIDAZOLAM 1 MG: 1 INJECTION INTRAMUSCULAR; INTRAVENOUS at 16:15

## 2020-11-22 RX ADMIN — PROPOFOL 20 MCG/KG/MIN: 10 INJECTION, EMULSION INTRAVENOUS at 04:08

## 2020-11-22 RX ADMIN — DIPHENHYDRAMINE HYDROCHLORIDE 50 MG: 50 INJECTION, SOLUTION INTRAMUSCULAR; INTRAVENOUS at 22:31

## 2020-11-22 RX ADMIN — NYSTATIN 500000 UNITS: 100000 SUSPENSION ORAL at 12:15

## 2020-11-22 RX ADMIN — FUROSEMIDE 40 MG: 10 INJECTION, SOLUTION INTRAVENOUS at 19:50

## 2020-11-22 RX ADMIN — Medication 0.4 MG/HR: at 21:16

## 2020-11-22 RX ADMIN — HEPARIN SODIUM 5000 UNITS: 5000 INJECTION, SOLUTION INTRAVENOUS; SUBCUTANEOUS at 04:10

## 2020-11-22 RX ADMIN — EPINEPHRINE 0.3 MG: 1 INJECTION INTRAMUSCULAR; INTRAVENOUS; SUBCUTANEOUS at 23:27

## 2020-11-22 RX ADMIN — PANTOPRAZOLE SODIUM 40 MG: 40 INJECTION, POWDER, FOR SOLUTION INTRAVENOUS at 08:20

## 2020-11-22 RX ADMIN — CARBOXYMETHYLCELLULOSE SODIUM 1 DROP: 5 SOLUTION/ DROPS OPHTHALMIC at 19:55

## 2020-11-22 RX ADMIN — HEPARIN SODIUM 5000 UNITS: 5000 INJECTION, SOLUTION INTRAVENOUS; SUBCUTANEOUS at 12:19

## 2020-11-22 RX ADMIN — CHLORHEXIDINE GLUCONATE 0.12% ORAL RINSE 15 ML: 1.2 LIQUID ORAL at 08:17

## 2020-11-22 RX ADMIN — MIDAZOLAM 1 MG: 1 INJECTION INTRAMUSCULAR; INTRAVENOUS at 15:15

## 2020-11-22 RX ADMIN — PROPOFOL 20 MCG/KG/MIN: 10 INJECTION, EMULSION INTRAVENOUS at 17:22

## 2020-11-22 RX ADMIN — PROPOFOL 30 MCG/KG/MIN: 10 INJECTION, EMULSION INTRAVENOUS at 08:52

## 2020-11-22 RX ADMIN — SULFAMETHOXAZOLE AND TRIMETHOPRIM 80 MG: 200; 40 SUSPENSION ORAL at 09:39

## 2020-11-22 RX ADMIN — NYSTATIN 500000 UNITS: 100000 SUSPENSION ORAL at 06:01

## 2020-11-22 RX ADMIN — CARBOXYMETHYLCELLULOSE SODIUM 1 DROP: 5 SOLUTION/ DROPS OPHTHALMIC at 12:21

## 2020-11-22 RX ADMIN — HYDROGEN PEROXIDE: 30 LIQUID TOPICAL at 16:28

## 2020-11-22 RX ADMIN — NYSTATIN 500000 UNITS: 100000 SUSPENSION ORAL at 23:47

## 2020-11-22 RX ADMIN — HEPARIN SODIUM 5000 UNITS: 5000 INJECTION, SOLUTION INTRAVENOUS; SUBCUTANEOUS at 19:54

## 2020-11-22 RX ADMIN — HUMAN IMMUNOGLOBULIN G 30 G: 20 LIQUID INTRAVENOUS at 22:59

## 2020-11-22 RX ADMIN — NYSTATIN 500000 UNITS: 100000 SUSPENSION ORAL at 18:06

## 2020-11-22 RX ADMIN — MIDAZOLAM 0.5 MG: 1 INJECTION INTRAMUSCULAR; INTRAVENOUS at 19:48

## 2020-11-22 RX ADMIN — PREDNISONE 60 MG: 20 TABLET ORAL at 09:39

## 2020-11-22 RX ADMIN — CARBOXYMETHYLCELLULOSE SODIUM 1 DROP: 5 SOLUTION/ DROPS OPHTHALMIC at 16:28

## 2020-11-22 RX ADMIN — ACETAMINOPHEN 650 MG: 325 TABLET, FILM COATED ORAL at 22:31

## 2020-11-22 RX ADMIN — CARBOXYMETHYLCELLULOSE SODIUM 1 DROP: 5 SOLUTION/ DROPS OPHTHALMIC at 05:59

## 2020-11-22 ASSESSMENT — ACTIVITIES OF DAILY LIVING (ADL)
ADLS_ACUITY_SCORE: 26

## 2020-11-22 ASSESSMENT — MIFFLIN-ST. JEOR: SCORE: 1715.38

## 2020-11-22 NOTE — PLAN OF CARE
Neuro: Alert. Appears to be oriented. PERRL. Follows command. Moves all extremities.   Resp: Clear/Dim PS all day  Cardio: WDL  GI: Stool leakage a rectal tube. 50 ml output  : Copious output  Skin: Pink and rashy in tori area. Miconazole applied  Pain: C/O in throat. PRN dilaudid given X3 with relief.  Events: Sat in chair 3 hours. Daughter updated.

## 2020-11-22 NOTE — PROGRESS NOTES
Date of Admission: 11/10/2020     Date of Intubation (most recent): Trached 11/19/20     Reason for Mechanical Ventilation: Resp Failure     Number of Days on Mechanical Ventilation: 12     Met Criteria for Spontaneous Breathing Trial: yes        Significant Events Today; pt was ps  5/8 for about 7 hours.  Recent Labs   Lab 11/19/20  0011 11/18/20  1030 11/18/20  0542 11/17/20  1619 11/17/20  1330   PH 7.45 7.46* 7.45 7.32* 7.31*   PCO2 56* 49* 51* 63* 65*   PO2 89 143* 60* 82 74*   HCO3 39* 34* 35* 33* 33*   O2PER 60% 100%  --  100% 100%     Ventilation Mode: CMV/AC  (Continuous Mandatory Ventilation/ Assist Control)  FiO2 (%): 40 %  Rate Set (breaths/minute): 16 breaths/min  Tidal Volume Set (mL): 500 mL  PEEP (cm H2O): 8 cmH2O  Pressure Support (cm H2O): 5 cmH2O  Oxygen Concentration (%): 40 %  Resp: 16   plan; continue ventilatory support SBT's daily

## 2020-11-22 NOTE — PLAN OF CARE
Calmer once placed back on vent support at 8pm.  Resting now on propofol, versed, dilaudid infusions. Follows commands.  Denies pain.  IvIg started and tolerating well.

## 2020-11-22 NOTE — PROGRESS NOTES
"  [facility]    Stroke/Neurocritical Care Progress Note    Interval Events  Pressure supported for multiple hours yesterday.    Impression  1. 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 Encouraging response to therapy with regard to best bulbar and limb exams over the past few days, but delayed respiratory status response necessitated trach. Hopefully will recover quickly enough that he does not need a PEG and can wean off of NG in the next few weeks.    2. Respiratory paralysis- secondary to #1, following daily inspiratory forces/tidal volumes in response to therapy    3. Pain/sedation- to tolerate trach    4. Lung consolidation- completed 7 day course of unasyn    Plan  Completed PLEX x5 rounds  Completed methylprednisolone 500 mg IV q12 x10  IVIg 0.4 mg/kg IV daily x5 days (3/5)  Continue prednisone 60 mg daily per tube, plan to continue ongoing  Daily MIP and tidal volume as tolerated per RT (trend documented in flowsheets, and in physical exam of this note)  Will start pyridostigmine prior to extubation readiness  Can follow up with Dr. Greenwood of [facility] post-hospital for ongoing management  Remainder of cares per Intensivist    The Stroke/Neurocritical Care Staff is Dr. Delaney.     Vicki Burns MD  Vascular Neurology Fellow  To page me or covering stroke neurology team member, click here: AMCOM   Choose \"On Call\" tab at top, then search dropdown box for \"Neurology Adult\", select location, press Enter, then look for stroke/neuro ICU/telestroke.    ______________________________________________________    Medications   Home Meds  Prior to Admission medications    Medication Sig Start Date End Date Taking? Authorizing Provider "   amoxicillin-clavulanate (AUGMENTIN) 875-125 MG tablet Take 1 tablet by mouth 2 times daily X 10 days (started 11/9/2020)   Yes Unknown, Entered By History   HYDROcodone-acetaminophen (NORCO) 5-325 MG tablet Take 1-2 tablets by mouth 2 times daily as needed for severe pain    Yes Unknown, Entered By History   predniSONE (DELTASONE) 20 MG tablet Take 20 mg by mouth 2 times daily X 5 days (started 11/9/2020)   Yes Unknown, Entered By History   tamsulosin (FLOMAX) 0.4 MG capsule Take 0.4 mg by mouth daily   Yes Unknown, Entered By History       Scheduled Meds    acetaminophen  650 mg Oral Q24H     [Held by provider] amLODIPine  10 mg Per Feeding Tube Daily     artificial tears ophthalmic solution  1 drop Both Eyes Q2H While awake     chlorhexidine  15 mL Mouth/Throat Q12H     diphenhydrAMINE  50 mg Oral Q24H    Or     diphenhydrAMINE  50 mg Intravenous Q24H     [Held by provider] furosemide  40 mg Intravenous Daily     haloperidol lactate  5 mg Intravenous Once     heparin ANTICOAGULANT  5,000 Units Subcutaneous Q8H     immune globulin - sucrose free  30 g Intravenous Q24H     insulin aspart  1-6 Units Subcutaneous Q4H     nystatin  500,000 Units Mouth/Throat 4x Daily     pantoprazole (PROTONIX) IV  40 mg Intravenous Daily     predniSONE  60 mg Oral Daily     QUEtiapine  25 mg Oral BID     sulfamethoxazole-trimethoprim  10 mL Per Feeding Tube Daily       Infusion Meds    HYDROmorphone 0.4 mg/hr (11/21/20 2336)     midazolam 1 mg/hr (11/22/20 1204)     norepinephrine Stopped (11/20/20 0646)     propofol (DIPRIVAN) infusion 30 mcg/kg/min (11/22/20 0852)     sodium chloride 20 mL/hr at 11/21/20 2337       PRN Meds  acetaminophen **OR** acetaminophen, acetaminophen, albuterol, glucose **OR** dextrose **OR** glucagon, diphenhydrAMINE **OR** diphenhydrAMINE, EPINEPHrine, famotidine, fentaNYL, hydrogen peroxide, hydromorphone, labetalol, methylPREDNISolone, miconazole, miconazole, midazolam, naloxone, ondansetron **OR**  ondansetron, senna-docusate **OR** senna-docusate       PHYSICAL EXAMINATION  Temp:  [97.7  F (36.5  C)-98.5  F (36.9  C)] 98.5  F (36.9  C)  Pulse:  [] 85  Resp:  [8-30] 12  BP: ()/(52-76) 121/68  FiO2 (%):  [40 %] 40 %  SpO2:  [90 %-100 %] 94 %     Date 11/13 11/14 11/15 11/16 11/17 11/18 11/19 11/20 11/21 11/22   [facility]/NIF -7 -4 -3 -5 -1 -- -- -- -- --   Vital Capacity 290 180 240 320 500 -- -- -- -- --       General:  patient lying in bed without any acute distress    HEENT:  normocephalic/atraumatic, edentulous, trach present  Cardio:  RRR  Pulmonary:  on mechanical ventilation  Abdomen:  soft, non-tender, non-distended  Extremities:  no edema, peripheral pulses palpable  Skin:  intact, warm/dry      Neurologic  Mental Status:  examined on dilaudid/versed. Eyes open spontaneously. Follows commands intermittently.  Cranial Nerves:  VFF, PERRL, ptosis present but mild, conjugate rest gaze, improved horizontal eye movements, face symmetric, weak lip purse  Motor: restless, tapping hands and feet, movements still brisk, elbow flexion 4/5, elbow extension 4-/5, finger flexion 5/5, finger extension 4/5. Hip flexion 4/5, knee flexion 4+/5, knee extension 4+/5, ankle dorsiflexion 5/5, ankle plantarflexion 5/5. No fasciculations, twitches, or abnormal movements.  Reflexes:  2+ intact and symmetric reflexes in bilateral biceps, brachioradialis, patellae, achilles, toes downgoing  Sensory:  light touch sensation intact and symmetric throughout upper and lower extremities  Coordination:  not tested  Station/Gait:  not tested    Imaging  I personally reviewed all imaging; relevant findings per HPI.     Lab Results Data   CBC  Recent Labs   Lab 11/22/20  0530 11/21/20  0400 11/20/20  1620 11/20/20  0455   WBC 14.5* 11.7*  --  16.9*   RBC 3.55* 3.32*  --  3.92*   HGB 10.7* 10.0* 12.0* 11.7*   HCT 31.9* 29.8*  --  35.4*    118*  --  121*     Basic Metabolic Panel    Recent Labs   Lab 11/22/20  0243  11/21/20  0400 11/20/20  1620 11/20/20  0455    139  --  140   POTASSIUM 3.9 3.7  --  3.5   CHLORIDE 103 105  --  104   CO2 32 33*  --  34*   BUN 34* 34*  --  38*   CR 0.68 0.67  --  0.68   * 130* 185* 186*   JESSICA 7.4* 7.2*  --  7.6*     Liver Panel  No results for input(s): PROTTOTAL, ALBUMIN, BILITOTAL, ALKPHOS, AST, ALT, BILIDIRECT in the last 168 hours.  INR    Recent Labs   Lab Test 11/17/20  0400   INR 1.20*      Lipid Profile  No lab results found.  A1C    Recent Labs   Lab Test 11/14/20  0402   A1C 6.0*     Troponin I    Recent Labs   Lab 11/18/20  0800   TROPI 0.030

## 2020-11-22 NOTE — PROGRESS NOTES
Patient remains intubated and mechanically ventilated. No vent changes were made .  Ambu and mask ahead of the bed. RT to continue to follow up with patient.

## 2020-11-23 LAB
BASE EXCESS BLDA CALC-SCNC: 8.5 MMOL/L
GLUCOSE BLDC GLUCOMTR-MCNC: 147 MG/DL (ref 70–99)
GLUCOSE BLDC GLUCOMTR-MCNC: 151 MG/DL (ref 70–99)
GLUCOSE BLDC GLUCOMTR-MCNC: 181 MG/DL (ref 70–99)
HCO3 BLD-SCNC: 33 MMOL/L (ref 21–28)
MAGNESIUM SERPL-MCNC: 2.6 MG/DL (ref 1.6–2.3)
O2/TOTAL GAS SETTING VFR VENT: 40 %
OXYHGB MFR BLD: 91 % (ref 92–100)
PCO2 BLD: 43 MM HG (ref 35–45)
PH BLD: 7.49 PH (ref 7.35–7.45)
PHOSPHATE SERPL-MCNC: 3 MG/DL (ref 2.5–4.5)
PO2 BLD: 60 MM HG (ref 80–105)

## 2020-11-23 PROCEDURE — 250N000013 HC RX MED GY IP 250 OP 250 PS 637: Performed by: PHYSICIAN ASSISTANT

## 2020-11-23 PROCEDURE — 999N001017 HC STATISTIC GLUCOSE BY METER IP

## 2020-11-23 PROCEDURE — 200N000001 HC R&B ICU

## 2020-11-23 PROCEDURE — 99291 CRITICAL CARE FIRST HOUR: CPT | Performed by: INTERNAL MEDICINE

## 2020-11-23 PROCEDURE — 83735 ASSAY OF MAGNESIUM: CPT | Performed by: PHYSICIAN ASSISTANT

## 2020-11-23 PROCEDURE — 250N000011 HC RX IP 250 OP 636: Performed by: ANESTHESIOLOGY

## 2020-11-23 PROCEDURE — 84100 ASSAY OF PHOSPHORUS: CPT | Performed by: PHYSICIAN ASSISTANT

## 2020-11-23 PROCEDURE — 250N000011 HC RX IP 250 OP 636: Performed by: INTERNAL MEDICINE

## 2020-11-23 PROCEDURE — 272N000078 HC NUTRITION PRODUCT INTERMEDIATE LITER

## 2020-11-23 PROCEDURE — 250N000013 HC RX MED GY IP 250 OP 250 PS 637: Performed by: INTERNAL MEDICINE

## 2020-11-23 PROCEDURE — 36600 WITHDRAWAL OF ARTERIAL BLOOD: CPT

## 2020-11-23 PROCEDURE — 94003 VENT MGMT INPAT SUBQ DAY: CPT

## 2020-11-23 PROCEDURE — 250N000012 HC RX MED GY IP 250 OP 636 PS 637: Performed by: PHYSICIAN ASSISTANT

## 2020-11-23 PROCEDURE — 999N000157 HC STATISTIC RCP TIME EA 10 MIN

## 2020-11-23 PROCEDURE — 999N000009 HC STATISTIC AIRWAY CARE

## 2020-11-23 PROCEDURE — 82805 BLOOD GASES W/O2 SATURATION: CPT | Performed by: INTERNAL MEDICINE

## 2020-11-23 PROCEDURE — 99291 CRITICAL CARE FIRST HOUR: CPT | Performed by: PSYCHIATRY & NEUROLOGY

## 2020-11-23 PROCEDURE — 250N000011 HC RX IP 250 OP 636: Performed by: PHYSICIAN ASSISTANT

## 2020-11-23 PROCEDURE — 94150 VITAL CAPACITY TEST: CPT

## 2020-11-23 RX ORDER — QUETIAPINE FUMARATE 25 MG/1
50 TABLET, FILM COATED ORAL 2 TIMES DAILY
Status: DISCONTINUED | OUTPATIENT
Start: 2020-11-23 | End: 2020-11-24

## 2020-11-23 RX ORDER — CARBOXYMETHYLCELLULOSE SODIUM 5 MG/ML
1 SOLUTION/ DROPS OPHTHALMIC
Status: DISCONTINUED | OUTPATIENT
Start: 2020-11-23 | End: 2020-12-05 | Stop reason: HOSPADM

## 2020-11-23 RX ADMIN — MIDAZOLAM 0.5 MG: 1 INJECTION INTRAMUSCULAR; INTRAVENOUS at 06:13

## 2020-11-23 RX ADMIN — PANTOPRAZOLE SODIUM 40 MG: 40 TABLET, DELAYED RELEASE ORAL at 09:13

## 2020-11-23 RX ADMIN — HALOPERIDOL LACTATE 5 MG: 5 INJECTION, SOLUTION INTRAMUSCULAR at 01:22

## 2020-11-23 RX ADMIN — HEPARIN SODIUM 5000 UNITS: 5000 INJECTION, SOLUTION INTRAVENOUS; SUBCUTANEOUS at 12:37

## 2020-11-23 RX ADMIN — PROPOFOL 10 MCG/KG/MIN: 10 INJECTION, EMULSION INTRAVENOUS at 01:00

## 2020-11-23 RX ADMIN — CARBOXYMETHYLCELLULOSE SODIUM 1 DROP: 5 SOLUTION/ DROPS OPHTHALMIC at 12:37

## 2020-11-23 RX ADMIN — CHLORHEXIDINE GLUCONATE 0.12% ORAL RINSE 15 ML: 1.2 LIQUID ORAL at 20:27

## 2020-11-23 RX ADMIN — QUETIAPINE 50 MG: 25 TABLET ORAL at 20:27

## 2020-11-23 RX ADMIN — NYSTATIN 500000 UNITS: 100000 SUSPENSION ORAL at 12:36

## 2020-11-23 RX ADMIN — QUETIAPINE 50 MG: 25 TABLET ORAL at 09:08

## 2020-11-23 RX ADMIN — HEPARIN SODIUM 5000 UNITS: 5000 INJECTION, SOLUTION INTRAVENOUS; SUBCUTANEOUS at 04:16

## 2020-11-23 RX ADMIN — CHLORHEXIDINE GLUCONATE 0.12% ORAL RINSE 15 ML: 1.2 LIQUID ORAL at 09:06

## 2020-11-23 RX ADMIN — FUROSEMIDE 40 MG: 10 INJECTION, SOLUTION INTRAVENOUS at 09:08

## 2020-11-23 RX ADMIN — HEPARIN SODIUM 5000 UNITS: 5000 INJECTION, SOLUTION INTRAVENOUS; SUBCUTANEOUS at 20:27

## 2020-11-23 RX ADMIN — CARBOXYMETHYLCELLULOSE SODIUM 1 DROP: 5 SOLUTION/ DROPS OPHTHALMIC at 09:08

## 2020-11-23 RX ADMIN — MIDAZOLAM 2 MG: 1 INJECTION INTRAMUSCULAR; INTRAVENOUS at 04:33

## 2020-11-23 RX ADMIN — SULFAMETHOXAZOLE AND TRIMETHOPRIM 80 MG: 200; 40 SUSPENSION ORAL at 09:11

## 2020-11-23 RX ADMIN — MIDAZOLAM 0.5 MG: 1 INJECTION INTRAMUSCULAR; INTRAVENOUS at 00:52

## 2020-11-23 RX ADMIN — MIDAZOLAM HYDROCHLORIDE 1 MG: 1 INJECTION, SOLUTION INTRAMUSCULAR; INTRAVENOUS at 22:50

## 2020-11-23 RX ADMIN — PREDNISONE 60 MG: 20 TABLET ORAL at 09:07

## 2020-11-23 RX ADMIN — NYSTATIN 500000 UNITS: 100000 SUSPENSION ORAL at 09:06

## 2020-11-23 ASSESSMENT — ACTIVITIES OF DAILY LIVING (ADL)
ADLS_ACUITY_SCORE: 26

## 2020-11-23 ASSESSMENT — MIFFLIN-ST. JEOR: SCORE: 1680.38

## 2020-11-23 NOTE — PROGRESS NOTES
[facility] ICU RESPIRATORY NOTE        Date of Admission: 11/10/2020    Date of Intubation (most recent): trached 11/19/20    Reason for Mechanical Ventilation: Respiratory failure     Number of Days on Mechanical Ventilation: 14    Met Criteria for Spontaneous Breathing Trial: pt switched to full mode @ 2313      Significant Events Today: none     ABG Results:   Recent Labs   Lab 11/19/20  0011 11/18/20  1030 11/18/20  0542 11/17/20  1619 11/17/20  1330   PH 7.45 7.46* 7.45 7.32* 7.31*   PCO2 56* 49* 51* 63* 65*   PO2 89 143* 60* 82 74*   HCO3 39* 34* 35* 33* 33*   O2PER 60% 100%  --  100% 100%       Current Vent Settings: Ventilation Mode: CMV/AC  (Continuous Mandatory Ventilation/ Assist Control)  FiO2 (%): 40 %  Rate Set (breaths/minute): 16 breaths/min  Tidal Volume Set (mL): 500 mL  PEEP (cm H2O): 8 cmH2O  Pressure Support (cm H2O): 5 cmH2O  Oxygen Concentration (%): 40 %  Resp: 16      Skin Assessment: intact     Plan: continue to wean as tolerated.    Dori Gallardo, RT

## 2020-11-23 NOTE — PROGRESS NOTES
SPIRITUAL HEALTH SERVICES  SPIRITUAL ASSESSMENT Progress Note  [facility] ICU     REFERRAL SOURCE: Initial ICU Visit    I shared a reflective phone call with patient's daughter, Piper today as a part of initial spiritual health outreach to family in ICU. I introduced self/role and oriented Piper to [facility] availability during her father's ICU stay.    Piper reflected on her father's time in the hospital. She shares it has been difficult to not be there with him and shared gratitude for the Rns making time for them to video chat. Piper shares her dad really likes movies and music and has shared these preferences with nursing staff. She asked about ways to get cards and pictures to be put up in his room which I offered some ways. She spent time reflecting on her dad, what he is like and [place] to support him right now. I offered emotional support through reflective listening, validation of emotions/experiences and words of comfort and peace.     PLAN: [facility] will continue to remain available.     Breana Becker  Associate    Phone: [phone]  Pager: [phone]

## 2020-11-23 NOTE — PROGRESS NOTES
[facility]    Stroke/Neurocritical Care Progress Note    Interval Events  Has completed 4/5 doses of IVIg. Hypotensive overnight related to treatment, received epi x1. Continues to express discomfort and be intermittently agitated.    Later in the day when up in the chair, more willing to be interactive, writing legibly, using letter board to attempt to communicate. Relates that he knew the skateboarder Jefferson Loja when he was 16 and they used to ride together.    Tearful and mouths that he wants to go home now, but expresses motivation to continue getting better and to wean from the trach.    Impression  1. 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 Encouraging response to PLEX x5 (11/12-19), methylpred 500 mg IV q12 x10 (11/12-16), and IVIg x4 (11/19-22) with regard to best bulbar and limb exams over the past few days, but delayed respiratory status response necessitated trach. Hopefully will recover quickly enough that he does not need a PEG and can wean off of NG in the next few weeks.    2. Respiratory paralysis- secondary to #1, following daily inspiratory forces/tidal volumes as tolerated in response to therapy    3. Pain/sedation- to tolerate trach    4. Lung consolidation- completed 7 day course of unasyn    Plan  No further IVIg due to concern for anaphylaxis/hypotension yesterday and good clinical response to this point  Continue prednisone 60 mg daily per tube, plan to continue ongoing for now  Daily MIP and tidal volume as tolerated per RT (trend documented in flowsheets, and in physical exam of this note)  Will start pyridostigmine prior to extubation readiness  Can follow up [facility] of [facility] post-hospital for ongoing  "management  Remainder of cares per Intensivist    The Stroke/Neurocritical Care Staff is Dr. Delaney.     Vicki Burns MD  Vascular Neurology Fellow  To page me or covering stroke neurology team member, click here: AMCOM   Choose \"On Call\" tab at top, then search dropdown box for \"Neurology Adult\", select location, press Enter, then look for stroke/neuro ICU/telestroke.    ______________________________________________________    Medications   Home Meds  Prior to Admission medications    Medication Sig Start Date End Date Taking? Authorizing Provider   amoxicillin-clavulanate (AUGMENTIN) 875-125 MG tablet Take 1 tablet by mouth 2 times daily X 10 days (started 11/9/2020)   Yes Unknown, Entered By History   HYDROcodone-acetaminophen (NORCO) 5-325 MG tablet Take 1-2 tablets by mouth 2 times daily as needed for severe pain    Yes Unknown, Entered By History   predniSONE (DELTASONE) 20 MG tablet Take 20 mg by mouth 2 times daily X 5 days (started 11/9/2020)   Yes Unknown, Entered By History   tamsulosin (FLOMAX) 0.4 MG capsule Take 0.4 mg by mouth daily   Yes Unknown, Entered By History       Scheduled Meds    acetaminophen  650 mg Oral Q24H     [Held by provider] amLODIPine  10 mg Per Feeding Tube Daily     artificial tears ophthalmic solution  1 drop Both Eyes Q2H While awake     chlorhexidine  15 mL Mouth/Throat Q12H     diphenhydrAMINE  50 mg Oral Q24H    Or     diphenhydrAMINE  50 mg Intravenous Q24H     furosemide  40 mg Intravenous Daily     heparin ANTICOAGULANT  5,000 Units Subcutaneous Q8H     immune globulin - sucrose free  30 g Intravenous Q24H     insulin aspart  1-6 Units Subcutaneous Q4H     nystatin  500,000 Units Mouth/Throat 4x Daily     pantoprazole  40 mg Oral QAM     predniSONE  60 mg Oral Daily     QUEtiapine  50 mg Oral BID     sulfamethoxazole-trimethoprim  10 mL Per Feeding Tube Daily       Infusion Meds    HYDROmorphone 0.4 mg/hr (11/23/20 0751)     midazolam 1 mg/hr (11/23/20 0750)     " norepinephrine Stopped (11/20/20 0646)     propofol (DIPRIVAN) infusion 15 mcg/kg/min (11/23/20 0750)     sodium chloride 20 mL/hr (11/23/20 0751)       PRN Meds  acetaminophen **OR** acetaminophen, acetaminophen, albuterol, glucose **OR** dextrose **OR** glucagon, diphenhydrAMINE **OR** diphenhydrAMINE, EPINEPHrine, famotidine, fentaNYL, hydrogen peroxide, hydromorphone, labetalol, methylPREDNISolone, miconazole, miconazole, midazolam, naloxone, ondansetron **OR** ondansetron, senna-docusate **OR** senna-docusate       PHYSICAL EXAMINATION  Temp:  [98.2  F (36.8  C)-98.8  F (37.1  C)] (P) 98.3  F (36.8  C)  Pulse:  [] 53  Resp:  [10-34] 16  BP: ()/() 93/59  FiO2 (%):  [40 %] 40 %  SpO2:  [81 %-100 %] 97 %     Date 11/13 11/14 11/15 11/16 11/17 11/18 11/19 11/20 11/21 11/22 11/23   MIP/NIF -7 -4 -3 -5 -1 -- -- -- -- --    Vital Capacity 290 180 240 320 500 -- -- -- -- --        General:  patient seated upright in chair without any acute distress    HEENT:  normocephalic/atraumatic, edentulous, trach present  Cardio:  RRR  Pulmonary:  on mechanical ventilation  Abdomen:  soft, non-tender, non-distended  Extremities:  no edema, peripheral pulses palpable  Skin:  intact, warm/dry      Neurologic  Mental Status:  examined on dilaudid/versed. Eyes open spontaneously. Follows commands reliably, mouths/gestures/nods/shakes to answer questions, writes with left hand and uses letter board.  Cranial Nerves:  VFF, PERRL, ptosis present but mild, conjugate rest gaze, improved horizontal eye movements, face symmetric, weak lip purse  Motor: restless, tapping hands and feet, movements still brisk, elbow flexion 4+/5, elbow extension 4/5, finger flexion 5/5, finger extension 4+/5. Hip flexion 4/5, knee flexion 4+/5, knee extension 4+/5, ankle dorsiflexion 5/5, ankle plantarflexion 5/5. No fasciculations, twitches, or abnormal movements.  Reflexes:  2+ intact and symmetric reflexes in bilateral biceps,  brachioradialis, patellae, achilles, toes downgoing  Sensory:  light touch sensation intact and symmetric throughout upper and lower extremities  Coordination:  not tested  Station/Gait:  not tested    Imaging  I personally reviewed all imaging; relevant findings per HPI.     Lab Results Data   CBC  Recent Labs   Lab 11/22/20  0530 11/21/20  0400 11/20/20  1620 11/20/20  0455   WBC 14.5* 11.7*  --  16.9*   RBC 3.55* 3.32*  --  3.92*   HGB 10.7* 10.0* 12.0* 11.7*   HCT 31.9* 29.8*  --  35.4*    118*  --  121*     Basic Metabolic Panel    Recent Labs   Lab 11/22/20  0530 11/21/20 0400 11/20/20  1620 11/20/20  0455    139  --  140   POTASSIUM 3.9 3.7  --  3.5   CHLORIDE 103 105  --  104   CO2 32 33*  --  34*   BUN 34* 34*  --  38*   CR 0.68 0.67  --  0.68   * 130* 185* 186*   JESSICA 7.4* 7.2*  --  7.6*     Liver Panel  No results for input(s): PROTTOTAL, ALBUMIN, BILITOTAL, ALKPHOS, AST, ALT, BILIDIRECT in the last 168 hours.  INR    Recent Labs   Lab Test 11/17/20  0400   INR 1.20*      Lipid Profile  No lab results found.  A1C    Recent Labs   Lab Test 11/14/20  0402   A1C 6.0*     Troponin I    Recent Labs   Lab 11/18/20  0800   TROPI 0.030

## 2020-11-23 NOTE — PLAN OF CARE
Neuro: PERRL. Able to follow commands. Able to make needs known.   Resp: LS clear/dim. Moderate amount of secretions  Cardio: WDL  GI: Output 150 rectal tube  : Adequate output  Pain: Complained of pain. PRN dilaudid given  Skin: Rosalia area excoriated and macerated.   Events; Pulled out NG tube today. Sat in chair for about 3 hours. Attempting to pull at trach. Was able to be redirected at times. As the evening wore on began to be increasingly agitated.

## 2020-11-23 NOTE — PLAN OF CARE
Patient on full vent support starting around 2100. Follows commands but is confused. Pulls at things and gets agitated easily. Haldol given x1 and effective for about 2 hours. 1 time dose of 2 mg versed given, effective. Propofol increased but patient became hypotensive so decreased. IVIG given and patient became hypotensive requiring 1 dose of epinephrine. Bradycardic into the 50s. Patient pulled NG tube, replaced. Albarado patent with pink/red output this AM after patient pulled on albarado catheter. Rectal tube in place. Rosalia area excoriated with peeling areas. Trach cares done and inner cannula changed.

## 2020-11-23 NOTE — PROGRESS NOTES
[facility] note: Thank you Yumiko for the referral.  Mr. Burris does meet the medical complexity for an LTACH level of care. Patient will need to be titrated down on sedation prior to admission. We will continue to follow as plan of care progresses.     Jesus Campbell   Referral Specialist   [phone]
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
## 2020-11-23 NOTE — PROGRESS NOTES
Care Management Follow Up    Length of Stay (days): 13    Expected Discharge Date:    Expected Time of Departure: Date/time unknown  Concerns to be Addressed: discharge planning;adjustment to diagnosis/illness     Patient plan of care discussed at interdisciplinary rounds: Yes    Anticipated Discharge Disposition: Other (Comments)  Disposition Comments: Discharge plan remains unknown at this time; Pt resides in a [facility] in [place], [place] that family is hopeful he can return to at some point.  Anticipated Discharge Services:    Anticipated Discharge DME:      Patient/family educated on Medicare website which has current facility and service quality ratings: other (see comments)  Education Provided on the Discharge Plan:  left message for daughter to call.  Patient/Family in Agreement with the Plan: other (see comments)    Referrals Placed by CM/SW:  Patient will need LTACH placement once medically stable to discharge. Writer put patient on both LTACH lists as they have a large waiting list.  Private pay costs discussed: Not applicable    Additional Information:  Will follow along with plan.      Yumiko Obregon RN

## 2020-11-24 ENCOUNTER — APPOINTMENT (OUTPATIENT)
Dept: GENERAL RADIOLOGY | Facility: CLINIC | Age: 60
DRG: 003 | End: 2020-11-24
Attending: ANESTHESIOLOGY
Payer: COMMERCIAL

## 2020-11-24 LAB
ALBUMIN SERPL-MCNC: 3 G/DL (ref 3.4–5)
ALP SERPL-CCNC: 164 U/L (ref 40–150)
ALT SERPL W P-5'-P-CCNC: 244 U/L (ref 0–70)
ANION GAP SERPL CALCULATED.3IONS-SCNC: 3 MMOL/L (ref 3–14)
AST SERPL W P-5'-P-CCNC: 98 U/L (ref 0–45)
BILIRUB SERPL-MCNC: 0.8 MG/DL (ref 0.2–1.3)
BUN SERPL-MCNC: 32 MG/DL (ref 7–30)
CA-I SERPL ISE-MCNC: 4.3 MG/DL (ref 4.4–5.2)
CALCIUM SERPL-MCNC: 8.2 MG/DL (ref 8.5–10.1)
CHLORIDE SERPL-SCNC: 102 MMOL/L (ref 94–109)
CO2 SERPL-SCNC: 30 MMOL/L (ref 20–32)
CREAT SERPL-MCNC: 0.7 MG/DL (ref 0.66–1.25)
DIFFERENTIAL METHOD BLD: ABNORMAL
ERYTHROCYTE [DISTWIDTH] IN BLOOD BY AUTOMATED COUNT: 15.4 % (ref 10–15)
GFR SERPL CREATININE-BSD FRML MDRD: >90 ML/MIN/{1.73_M2}
GLUCOSE BLDC GLUCOMTR-MCNC: 127 MG/DL (ref 70–99)
GLUCOSE BLDC GLUCOMTR-MCNC: 141 MG/DL (ref 70–99)
GLUCOSE BLDC GLUCOMTR-MCNC: 146 MG/DL (ref 70–99)
GLUCOSE BLDC GLUCOMTR-MCNC: 155 MG/DL (ref 70–99)
GLUCOSE BLDC GLUCOMTR-MCNC: 155 MG/DL (ref 70–99)
GLUCOSE BLDC GLUCOMTR-MCNC: 174 MG/DL (ref 70–99)
GLUCOSE BLDC GLUCOMTR-MCNC: 199 MG/DL (ref 70–99)
GLUCOSE BLDC GLUCOMTR-MCNC: 204 MG/DL (ref 70–99)
GLUCOSE SERPL-MCNC: 174 MG/DL (ref 70–99)
HCT VFR BLD AUTO: 35.2 % (ref 40–53)
HGB BLD-MCNC: 11 G/DL (ref 13.3–17.7)
HGB BLD-MCNC: 11.5 G/DL (ref 13.3–17.7)
IGA SERPL-MCNC: 34 MG/DL (ref 84–499)
IMM GRANULOCYTES # BLD: 1.1 10E9/L (ref 0–0.4)
IMM GRANULOCYTES NFR BLD: 6 %
LYMPHOCYTES # BLD AUTO: 1.7 10E9/L (ref 0.8–5.3)
LYMPHOCYTES NFR BLD AUTO: 9 %
MAGNESIUM SERPL-MCNC: 2.3 MG/DL (ref 1.6–2.3)
MCH RBC QN AUTO: 29.6 PG (ref 26.5–33)
MCHC RBC AUTO-ENTMCNC: 32.7 G/DL (ref 31.5–36.5)
MCV RBC AUTO: 91 FL (ref 78–100)
MONOCYTES # BLD AUTO: 1.1 10E9/L (ref 0–1.3)
MONOCYTES NFR BLD AUTO: 6 %
NEUTROPHILS # BLD AUTO: 14.8 10E9/L (ref 1.6–8.3)
NEUTROPHILS NFR BLD AUTO: 79 %
PHOSPHATE SERPL-MCNC: 3.4 MG/DL (ref 2.5–4.5)
PLATELET # BLD AUTO: 232 10E9/L (ref 150–450)
PLATELET # BLD AUTO: 242 10E9/L (ref 150–450)
POTASSIUM SERPL-SCNC: 4 MMOL/L (ref 3.4–5.3)
PROT SERPL-MCNC: 7.3 G/DL (ref 6.8–8.8)
RBC # BLD AUTO: 3.89 10E12/L (ref 4.4–5.9)
SODIUM SERPL-SCNC: 135 MMOL/L (ref 133–144)
WBC # BLD AUTO: 18.7 10E9/L (ref 4–11)

## 2020-11-24 PROCEDURE — 82330 ASSAY OF CALCIUM: CPT | Performed by: INTERNAL MEDICINE

## 2020-11-24 PROCEDURE — 74018 RADEX ABDOMEN 1 VIEW: CPT

## 2020-11-24 PROCEDURE — 250N000011 HC RX IP 250 OP 636: Performed by: PHYSICIAN ASSISTANT

## 2020-11-24 PROCEDURE — 250N000012 HC RX MED GY IP 250 OP 636 PS 637: Performed by: PHYSICIAN ASSISTANT

## 2020-11-24 PROCEDURE — 99291 CRITICAL CARE FIRST HOUR: CPT | Performed by: PSYCHIATRY & NEUROLOGY

## 2020-11-24 PROCEDURE — 85018 HEMOGLOBIN: CPT | Performed by: PHYSICIAN ASSISTANT

## 2020-11-24 PROCEDURE — 250N000009 HC RX 250: Performed by: INTERNAL MEDICINE

## 2020-11-24 PROCEDURE — 272N000078 HC NUTRITION PRODUCT INTERMEDIATE LITER

## 2020-11-24 PROCEDURE — 250N000013 HC RX MED GY IP 250 OP 250 PS 637: Performed by: PHYSICIAN ASSISTANT

## 2020-11-24 PROCEDURE — 80048 BASIC METABOLIC PNL TOTAL CA: CPT | Performed by: INTERNAL MEDICINE

## 2020-11-24 PROCEDURE — 83735 ASSAY OF MAGNESIUM: CPT | Performed by: INTERNAL MEDICINE

## 2020-11-24 PROCEDURE — 94003 VENT MGMT INPAT SUBQ DAY: CPT

## 2020-11-24 PROCEDURE — 80053 COMPREHEN METABOLIC PANEL: CPT | Performed by: INTERNAL MEDICINE

## 2020-11-24 PROCEDURE — 85025 COMPLETE CBC W/AUTO DIFF WBC: CPT | Performed by: INTERNAL MEDICINE

## 2020-11-24 PROCEDURE — 999N000009 HC STATISTIC AIRWAY CARE

## 2020-11-24 PROCEDURE — 85049 AUTOMATED PLATELET COUNT: CPT | Performed by: PHYSICIAN ASSISTANT

## 2020-11-24 PROCEDURE — 250N000011 HC RX IP 250 OP 636: Performed by: INTERNAL MEDICINE

## 2020-11-24 PROCEDURE — 84100 ASSAY OF PHOSPHORUS: CPT | Performed by: INTERNAL MEDICINE

## 2020-11-24 PROCEDURE — 99291 CRITICAL CARE FIRST HOUR: CPT | Performed by: INTERNAL MEDICINE

## 2020-11-24 PROCEDURE — 999N001017 HC STATISTIC GLUCOSE BY METER IP

## 2020-11-24 PROCEDURE — 258N000003 HC RX IP 258 OP 636: Performed by: INTERNAL MEDICINE

## 2020-11-24 PROCEDURE — 93005 ELECTROCARDIOGRAM TRACING: CPT

## 2020-11-24 PROCEDURE — 200N000001 HC R&B ICU

## 2020-11-24 PROCEDURE — 999N000157 HC STATISTIC RCP TIME EA 10 MIN

## 2020-11-24 PROCEDURE — 250N000013 HC RX MED GY IP 250 OP 250 PS 637: Performed by: INTERNAL MEDICINE

## 2020-11-24 PROCEDURE — 93010 ELECTROCARDIOGRAM REPORT: CPT | Performed by: INTERNAL MEDICINE

## 2020-11-24 PROCEDURE — 250N000011 HC RX IP 250 OP 636: Performed by: ANESTHESIOLOGY

## 2020-11-24 RX ORDER — NALOXONE HYDROCHLORIDE 0.4 MG/ML
0.4 INJECTION, SOLUTION INTRAMUSCULAR; INTRAVENOUS; SUBCUTANEOUS
Status: DISCONTINUED | OUTPATIENT
Start: 2020-11-24 | End: 2020-12-05 | Stop reason: HOSPADM

## 2020-11-24 RX ORDER — ONDANSETRON 2 MG/ML
4 INJECTION INTRAMUSCULAR; INTRAVENOUS ONCE
Status: DISCONTINUED | OUTPATIENT
Start: 2020-11-24 | End: 2020-11-27

## 2020-11-24 RX ORDER — QUETIAPINE FUMARATE 25 MG/1
75 TABLET, FILM COATED ORAL 2 TIMES DAILY
Status: DISCONTINUED | OUTPATIENT
Start: 2020-11-24 | End: 2020-11-29

## 2020-11-24 RX ORDER — ONDANSETRON 2 MG/ML
4 INJECTION INTRAMUSCULAR; INTRAVENOUS EVERY 6 HOURS PRN
Status: DISCONTINUED | OUTPATIENT
Start: 2020-11-24 | End: 2020-12-05 | Stop reason: HOSPADM

## 2020-11-24 RX ORDER — PREDNISONE 20 MG/1
60 TABLET ORAL DAILY
Status: DISCONTINUED | OUTPATIENT
Start: 2020-11-25 | End: 2020-12-05 | Stop reason: HOSPADM

## 2020-11-24 RX ORDER — NALOXONE HYDROCHLORIDE 0.4 MG/ML
0.2 INJECTION, SOLUTION INTRAMUSCULAR; INTRAVENOUS; SUBCUTANEOUS
Status: DISCONTINUED | OUTPATIENT
Start: 2020-11-24 | End: 2020-12-05 | Stop reason: HOSPADM

## 2020-11-24 RX ORDER — ONDANSETRON 4 MG/1
8 TABLET, ORALLY DISINTEGRATING ORAL EVERY 6 HOURS PRN
Status: DISCONTINUED | OUTPATIENT
Start: 2020-11-24 | End: 2020-12-05 | Stop reason: HOSPADM

## 2020-11-24 RX ORDER — CALCIUM CHLORIDE 100 MG/ML
1 INJECTION INTRAVENOUS; INTRAVENTRICULAR ONCE
Status: DISCONTINUED | OUTPATIENT
Start: 2020-11-24 | End: 2020-11-24

## 2020-11-24 RX ADMIN — ONDANSETRON 4 MG: 2 INJECTION INTRAMUSCULAR; INTRAVENOUS at 22:27

## 2020-11-24 RX ADMIN — NYSTATIN 500000 UNITS: 100000 SUSPENSION ORAL at 18:00

## 2020-11-24 RX ADMIN — CHLORHEXIDINE GLUCONATE 0.12% ORAL RINSE 15 ML: 1.2 LIQUID ORAL at 23:00

## 2020-11-24 RX ADMIN — HEPARIN SODIUM 5000 UNITS: 5000 INJECTION, SOLUTION INTRAVENOUS; SUBCUTANEOUS at 05:00

## 2020-11-24 RX ADMIN — NYSTATIN 500000 UNITS: 100000 SUSPENSION ORAL at 11:51

## 2020-11-24 RX ADMIN — CALCIUM CHLORIDE 1 G: 100 INJECTION, SOLUTION INTRAVENOUS at 16:54

## 2020-11-24 RX ADMIN — HEPARIN SODIUM 5000 UNITS: 5000 INJECTION, SOLUTION INTRAVENOUS; SUBCUTANEOUS at 11:51

## 2020-11-24 RX ADMIN — SULFAMETHOXAZOLE AND TRIMETHOPRIM 80 MG: 200; 40 SUSPENSION ORAL at 08:07

## 2020-11-24 RX ADMIN — MIDAZOLAM (PF) 1 MG/ML IN 0.9 % SODIUM CHLORIDE INTRAVENOUS SOLUTION 1.5 MG/HR: at 10:04

## 2020-11-24 RX ADMIN — CHLORHEXIDINE GLUCONATE 0.12% ORAL RINSE 15 ML: 1.2 LIQUID ORAL at 08:05

## 2020-11-24 RX ADMIN — PREDNISONE 60 MG: 20 TABLET ORAL at 08:08

## 2020-11-24 RX ADMIN — NYSTATIN 500000 UNITS: 100000 SUSPENSION ORAL at 06:19

## 2020-11-24 RX ADMIN — QUETIAPINE 50 MG: 25 TABLET ORAL at 08:05

## 2020-11-24 RX ADMIN — NYSTATIN 500000 UNITS: 100000 SUSPENSION ORAL at 00:13

## 2020-11-24 RX ADMIN — HEPARIN SODIUM 5000 UNITS: 5000 INJECTION, SOLUTION INTRAVENOUS; SUBCUTANEOUS at 22:27

## 2020-11-24 RX ADMIN — PANTOPRAZOLE SODIUM 40 MG: 40 TABLET, DELAYED RELEASE ORAL at 08:05

## 2020-11-24 RX ADMIN — FUROSEMIDE 40 MG: 10 INJECTION, SOLUTION INTRAVENOUS at 08:06

## 2020-11-24 RX ADMIN — ACETAMINOPHEN 650 MG: 325 TABLET, FILM COATED ORAL at 16:55

## 2020-11-24 ASSESSMENT — ACTIVITIES OF DAILY LIVING (ADL)
ADLS_ACUITY_SCORE: 26
ADLS_ACUITY_SCORE: 16
ADLS_ACUITY_SCORE: 26

## 2020-11-24 ASSESSMENT — MIFFLIN-ST. JEOR: SCORE: 1636.38
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
## 2020-11-24 NOTE — PROGRESS NOTES
"  [facility]    Stroke/Neurocritical Care Progress Note    Interval Events  Communicating via writing. Feeling better today. Bright affect, seems motivated. Exam improved.    Impression  1. 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 Encouraging response to PLEX x5 (11/12-19), methylpred 500 mg IV q12 x10 (11/12-16), and IVIg x4 (11/19-22) with regard to best bulbar and limb exams over the past few days, but delayed respiratory status response necessitated trach. Hopefully will recover quickly enough that he does not need a PEG and can wean off of NG in the next few weeks.    2. Respiratory paralysis- secondary to #1, following daily inspiratory forces/tidal volumes as tolerated in response to therapy    3. Pain/sedation- to tolerate trach    4. Lung consolidation- completed 7 day course of unasyn    Plan  Continue prednisone 60 mg daily per tube, plan to start down-titration at one month from start, will adjust order to reflect this.  Start azathiaprine titration (ordered for you- 50 mg daily, increase by 50 mg every 2 weeks until goal dose of 1 mg/pound daily, or ~175 mg daily)  Daily MIP and tidal volume as tolerated per RT (trend documented in flowsheets, and in physical exam of this note)  Will start pyridostigmine prior to extubation readiness  Can follow up with Dr. Greenwood of [facility] post-hospital for ongoing management (he may have a [place] or [place] outreach clinic available for this patient from [facility] per Intensivist    The Stroke/Neurocritical Care Staff is Dr. Delaney.     Vicki Burns MD  Vascular Neurology Fellow  To page me or covering stroke neurology team member, click here: AMCOM   Choose \"On Call\" tab at " "top, then search dropdown box for \"Neurology Adult\", select location, press Enter, then look for stroke/neuro ICU/telestroke.    ______________________________________________________    Medications   Home Meds  Prior to Admission medications    Medication Sig Start Date End Date Taking? Authorizing Provider   amoxicillin-clavulanate (AUGMENTIN) 875-125 MG tablet Take 1 tablet by mouth 2 times daily X 10 days (started 11/9/2020)   Yes Unknown, Entered By History   HYDROcodone-acetaminophen (NORCO) 5-325 MG tablet Take 1-2 tablets by mouth 2 times daily as needed for severe pain    Yes Unknown, Entered By History   predniSONE (DELTASONE) 20 MG tablet Take 20 mg by mouth 2 times daily X 5 days (started 11/9/2020)   Yes Unknown, Entered By History   tamsulosin (FLOMAX) 0.4 MG capsule Take 0.4 mg by mouth daily   Yes Unknown, Entered By History       Scheduled Meds    [Held by provider] amLODIPine  10 mg Per Feeding Tube Daily     chlorhexidine  15 mL Mouth/Throat Q12H     furosemide  40 mg Intravenous Daily     heparin ANTICOAGULANT  5,000 Units Subcutaneous Q8H     insulin aspart  1-6 Units Subcutaneous Q4H     nystatin  500,000 Units Mouth/Throat 4x Daily     pantoprazole  40 mg Oral QAM     predniSONE  60 mg Oral Daily     QUEtiapine  50 mg Oral BID     sulfamethoxazole-trimethoprim  10 mL Per Feeding Tube Daily       Infusion Meds    HYDROmorphone 0.2 mg/hr (11/24/20 0800)     midazolam 1 mg/hr (11/24/20 1030)     norepinephrine Stopped (11/20/20 0646)     propofol (DIPRIVAN) infusion Stopped (11/23/20 1024)     sodium chloride 20 mL/hr at 11/24/20 0800       PRN Meds  acetaminophen **OR** acetaminophen, albuterol, artificial tears ophthalmic solution, glucose **OR** dextrose **OR** glucagon, fentaNYL, hydrogen peroxide, hydromorphone, labetalol, miconazole, miconazole, midazolam, naloxone **OR** naloxone **OR** naloxone **OR** naloxone, ondansetron **OR** ondansetron, senna-docusate **OR** senna-docusate   "     PHYSICAL EXAMINATION  Temp:  [97.9  F (36.6  C)-99.1  F (37.3  C)] 99.1  F (37.3  C)  Pulse:  [] 84  Resp:  [15-27] 16  BP: ()/(61-95) 127/80  FiO2 (%):  [40 %] 40 %  SpO2:  [91 %-99 %] 96 %     Date 11/13 11/14 11/15 11/16 11/17 11/18 11/19 11/20 11/21 11/22 11/23 11/24   MIP/NIF -7 -4 -3 -5 -1 -- -- -- -- -- -20 --   Vital Capacity 290 180 240 320 500 -- -- -- -- -- 930 --       General:  patient seated upright in chair without any acute distress    HEENT:  normocephalic/atraumatic, edentulous, trach present  Cardio:  RRR  Pulmonary:  on mechanical ventilation  Abdomen:  soft, non-tender, non-distended  Extremities:  no edema, peripheral pulses palpable  Skin:  intact, warm/dry      Neurologic  Mental Status:  examined on no sedation. Eyes open spontaneously. Follows commands reliably, mouths/gestures/nods/shakes to answer questions, writes with left hand and uses letter board.  Cranial Nerves:  VFF, PERRL, minimal ptosis, conjugate rest gaze, intact horizontal eye movements, face symmetric, weak lip purse  Motor: restless, tapping hands and feet, movements still brisk, elbow flexion 5-/5, elbow extension 4+/5, finger flexion 5/5, finger extension 5/5. Hip flexion 4/5, knee flexion 4+/5, knee extension 4+/5, ankle dorsiflexion 5/5, ankle plantarflexion 5/5. No fasciculations, twitches, or abnormal movements.  Reflexes:  2+ intact and symmetric reflexes in bilateral biceps, brachioradialis, patellae, achilles, toes downgoing  Sensory:  light touch sensation intact and symmetric throughout upper and lower extremities  Coordination:  not tested  Station/Gait:  not tested    Imaging  I personally reviewed all imaging; relevant findings per HPI.     Lab Results Data   CBC  Recent Labs   Lab 11/24/20  0453 11/22/20  0530 11/21/20  0400 11/20/20  0455 11/20/20  0455   WBC  --  14.5* 11.7*  --  16.9*   RBC  --  3.55* 3.32*  --  3.92*   HGB 11.0* 10.7* 10.0*   < > 11.7*   HCT  --  31.9* 29.8*  --  35.4*     156 118*  --  121*    < > = values in this interval not displayed.     Basic Metabolic Panel    Recent Labs   Lab 11/22/20  0530 11/21/20  0400 11/20/20  1620 11/20/20  0455    139  --  140   POTASSIUM 3.9 3.7  --  3.5   CHLORIDE 103 105  --  104   CO2 32 33*  --  34*   BUN 34* 34*  --  38*   CR 0.68 0.67  --  0.68   * 130* 185* 186*   JESSICA 7.4* 7.2*  --  7.6*     Liver Panel  No results for input(s): PROTTOTAL, ALBUMIN, BILITOTAL, ALKPHOS, AST, ALT, BILIDIRECT in the last 168 hours.  INR    Recent Labs   Lab Test 11/17/20  0400   INR 1.20*      Lipid Profile  No lab results found.  A1C    Recent Labs   Lab Test 11/14/20  0402   A1C 6.0*     Troponin I    Recent Labs   Lab 11/18/20  0800   TROPI 0.030

## 2020-11-24 NOTE — PROGRESS NOTES
Neuro: weaned off propofol. Pt alert and awake. Patient following commands. Patient sat in chair for 5 hours. Patient able to communicate with writing.     Cardiac: NS. BP 130s-140S systolic    Pulm: Pressure support for 2 hours. Placed back on SIMV for over night.     GI/: Lasix given. [place] may be discontinued. Urine pink tinged due to patient pulling catheter last night.     Plan: continue weaning drips. Pressure support trials.

## 2020-11-24 NOTE — PROGRESS NOTES
CLINICAL NUTRITION SERVICES - REASSESSMENT NOTE      Malnutrition: (11/24)  % Weight Loss:  > 5% in 1 month (severe malnutrition)  % Intake:  </= 75% for >/= 1 month (severe malnutrition)  Subcutaneous Fat Loss:  Orbital region mild depletion  Muscle Loss:  Temporal region moderate depletion, Clavicle bone region mild depletion and Acromion bone region mild depletion  Fluid Retention:  Mild     Malnutrition Diagnosis: Severe malnutrition  In Context of:  Acute illness or injury  Chronic illness or disease       EVALUATION OF PROGRESS TOWARD GOALS   Diet:  NPO with trach    Nutrition Support:  TF continues as below:    Nutrition Support Enteral:  Type of Feeding Tube: NG  Enteral Frequency:  Continuous  Enteral Regimen: Isosource 1.5 at 65 mL/hr  Total Enteral Provisions: 2340 kcal (29 kcal/kg), 106 g protein (1.3 g/kg), 275 g CHO, 23 g fiber, 1186 mL H2O  Free Water Flush:  60 mL every 4 hours (decreased from 200 mL every 4 hrs per MD request)    Intake/Tolerance:    Stool:  x4 yesterday (880 mL) and x2 thus far today.  Na 135 (improved, WNL)  BGM:  181, 204, 155, 141, 146, 127 - improving.  Med Res sliding scale insulin for glycemic control  I/O:  0255/1630.  Wt:  83.1 kg (up 1.3 kg since admit).  Pt with 1+ generalized edema.  Lasix for diuresis.    ASSESSED NUTRITION NEEDS:  Dosing Weight 81.8 kg (admit wt)  Estimated Energy Needs: 2539-8375 kcals (25-30 Kcal/Kg)  Justification: maintenance  Estimated Protein Needs:  grams protein (1.2-1.5 g pro/Kg)  Justification: repletion    NEW FINDINGS:   Nutrition Focused Physical Examination completed today --> noted fat loss and muscle wasting (see below).    11/19:  End plasmaphresis   11/19:  Tracheostomy with Shiley #8 cuffed nonfenestrated tube  11/19:  OG --> NG tube for feeds  11/20:  Procedure = Revision of tracheostomy (for bleeding)  11/22:  Pt pulled out NG  11/22:  AXR = Nasoenteric feeding tube tip in the distal stomach or duodenal bulb  (NG)    11/23:  Skin peeling off on coccyx per RN.    Previous Goals (11/19):   TF will resume within the next 24 hours and patient will meet % needs from goal TF regimen   Evaluation: Met    Previous Nutrition Diagnosis (11/19):   Inadequate protein-energy intake related to NPO, TF on hold for Trach as evidenced by meeting 0% needs   Evaluation:  Resolved    MALNUTRITION  % Weight Loss:  > 5% in 1 month (severe malnutrition)  % Intake:  </= 75% for >/= 1 month (severe malnutrition)  Subcutaneous Fat Loss:  Orbital region mild depletion  Muscle Loss:  Temporal region moderate depletion, Clavicle bone region mild depletion and Acromion bone region mild depletion  Fluid Retention:  Mild     Malnutrition Diagnosis: Severe malnutrition  In Context of:  Acute illness or injury  Chronic illness or disease    CURRENT NUTRITION DIAGNOSIS  No nutrition diagnosis identified at this time     INTERVENTIONS  Recommendations / Nutrition Prescription  Continue TF as above    Implementation  Collaboration and Referral of Nutrition care - Pt was discussed during ICU interdisciplinary rounds this morning.    Goals  TF Isosource 1.5 at 65 mL/hr will continue to meet % estimated needs    MONITORING AND EVALUATION:  Progress towards goals will be monitored and evaluated per protocol and Practice Guidelines
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
## 2020-11-25 ENCOUNTER — APPOINTMENT (OUTPATIENT)
Dept: CT IMAGING | Facility: CLINIC | Age: 60
DRG: 003 | End: 2020-11-25
Attending: INTERNAL MEDICINE
Payer: COMMERCIAL

## 2020-11-25 ENCOUNTER — APPOINTMENT (OUTPATIENT)
Dept: GENERAL RADIOLOGY | Facility: CLINIC | Age: 60
DRG: 003 | End: 2020-11-25
Attending: ANESTHESIOLOGY
Payer: COMMERCIAL

## 2020-11-25 LAB
ALBUMIN SERPL-MCNC: NORMAL G/DL (ref 3.4–5)
ALP SERPL-CCNC: NORMAL U/L (ref 40–150)
ALT SERPL W P-5'-P-CCNC: NORMAL U/L (ref 0–70)
ANION GAP SERPL CALCULATED.3IONS-SCNC: NORMAL MMOL/L (ref 6–17)
APPEARANCE FLD: NORMAL
AST SERPL W P-5'-P-CCNC: NORMAL U/L (ref 0–45)
BILIRUB SERPL-MCNC: NORMAL MG/DL (ref 0.2–1.3)
BUN SERPL-MCNC: NORMAL MG/DL (ref 7–30)
CALCIUM SERPL-MCNC: NORMAL MG/DL (ref 8.5–10.1)
CHLORIDE SERPL-SCNC: NORMAL MMOL/L (ref 94–109)
CO2 SERPL-SCNC: NORMAL MMOL/L (ref 20–32)
COLOR FLD: NORMAL
CREAT SERPL-MCNC: NORMAL MG/DL (ref 0.66–1.25)
GFR SERPL CREATININE-BSD FRML MDRD: NORMAL ML/MIN/{1.73_M2}
GLUCOSE BLDC GLUCOMTR-MCNC: 119 MG/DL (ref 70–99)
GLUCOSE BLDC GLUCOMTR-MCNC: 120 MG/DL (ref 70–99)
GLUCOSE BLDC GLUCOMTR-MCNC: 122 MG/DL (ref 70–99)
GLUCOSE BLDC GLUCOMTR-MCNC: 126 MG/DL (ref 70–99)
GLUCOSE BLDC GLUCOMTR-MCNC: 150 MG/DL (ref 70–99)
GLUCOSE BLDC GLUCOMTR-MCNC: 151 MG/DL (ref 70–99)
GLUCOSE BLDC GLUCOMTR-MCNC: 98 MG/DL (ref 70–99)
GLUCOSE SERPL-MCNC: NORMAL MG/DL (ref 70–99)
GRAM STN SPEC: ABNORMAL
INTERPRETATION ECG - MUSE: NORMAL
KOH PREP SPEC: NORMAL
LYMPHOCYTES NFR FLD MANUAL: 1 %
Lab: 100 %
MAGNESIUM SERPL-MCNC: NORMAL MG/DL (ref 1.6–2.3)
MONOS+MACROS NFR FLD MANUAL: 10 %
NEUTS BAND NFR FLD MANUAL: 89 %
PHOSPHATE SERPL-MCNC: NORMAL MG/DL (ref 2.5–4.5)
PNP ABY SERUM: NORMAL
POTASSIUM SERPL-SCNC: NORMAL MMOL/L (ref 3.4–5.3)
PROT SERPL-MCNC: NORMAL G/DL (ref 6.8–8.8)
SODIUM SERPL-SCNC: NORMAL MMOL/L (ref 133–144)
SPECIMEN SOURCE FLD: NORMAL
SPECIMEN SOURCE: ABNORMAL
SPECIMEN SOURCE: NORMAL
WBC # FLD AUTO: NORMAL /UL

## 2020-11-25 PROCEDURE — 250N000013 HC RX MED GY IP 250 OP 250 PS 637: Performed by: STUDENT IN AN ORGANIZED HEALTH CARE EDUCATION/TRAINING PROGRAM

## 2020-11-25 PROCEDURE — 87205 SMEAR GRAM STAIN: CPT | Performed by: INTERNAL MEDICINE

## 2020-11-25 PROCEDURE — 250N000013 HC RX MED GY IP 250 OP 250 PS 637: Performed by: PHYSICIAN ASSISTANT

## 2020-11-25 PROCEDURE — 87070 CULTURE OTHR SPECIMN AEROBIC: CPT | Performed by: INTERNAL MEDICINE

## 2020-11-25 PROCEDURE — 0B9F8ZZ DRAINAGE OF RIGHT LOWER LUNG LOBE, VIA NATURAL OR ARTIFICIAL OPENING ENDOSCOPIC: ICD-10-PCS | Performed by: INTERNAL MEDICINE

## 2020-11-25 PROCEDURE — 87102 FUNGUS ISOLATION CULTURE: CPT | Performed by: INTERNAL MEDICINE

## 2020-11-25 PROCEDURE — 999N000009 HC STATISTIC AIRWAY CARE

## 2020-11-25 PROCEDURE — 87206 SMEAR FLUORESCENT/ACID STAI: CPT | Performed by: INTERNAL MEDICINE

## 2020-11-25 PROCEDURE — 999N001017 HC STATISTIC GLUCOSE BY METER IP

## 2020-11-25 PROCEDURE — 94003 VENT MGMT INPAT SUBQ DAY: CPT

## 2020-11-25 PROCEDURE — 99291 CRITICAL CARE FIRST HOUR: CPT | Mod: 25 | Performed by: INTERNAL MEDICINE

## 2020-11-25 PROCEDURE — 88312 SPECIAL STAINS GROUP 1: CPT | Mod: 26 | Performed by: PATHOLOGY

## 2020-11-25 PROCEDURE — 999N000078 HC STATISTIC INTRAPULMONARY PERCUSSIVE VENT

## 2020-11-25 PROCEDURE — 250N000013 HC RX MED GY IP 250 OP 250 PS 637: Performed by: INTERNAL MEDICINE

## 2020-11-25 PROCEDURE — 999N000157 HC STATISTIC RCP TIME EA 10 MIN

## 2020-11-25 PROCEDURE — 250N000011 HC RX IP 250 OP 636: Performed by: PHYSICIAN ASSISTANT

## 2020-11-25 PROCEDURE — 999N000185 HC STATISTIC TRANSPORT TIME EA 15 MIN

## 2020-11-25 PROCEDURE — 71045 X-RAY EXAM CHEST 1 VIEW: CPT

## 2020-11-25 PROCEDURE — 88108 CYTOPATH CONCENTRATE TECH: CPT | Mod: 26 | Performed by: PATHOLOGY

## 2020-11-25 PROCEDURE — 87106 FUNGI IDENTIFICATION YEAST: CPT | Performed by: INTERNAL MEDICINE

## 2020-11-25 PROCEDURE — 74176 CT ABD & PELVIS W/O CONTRAST: CPT

## 2020-11-25 PROCEDURE — 87633 RESP VIRUS 12-25 TARGETS: CPT | Performed by: INTERNAL MEDICINE

## 2020-11-25 PROCEDURE — 87116 MYCOBACTERIA CULTURE: CPT | Performed by: INTERNAL MEDICINE

## 2020-11-25 PROCEDURE — 88312 SPECIAL STAINS GROUP 1: CPT | Mod: TC | Performed by: INTERNAL MEDICINE

## 2020-11-25 PROCEDURE — 31624 DX BRONCHOSCOPE/LAVAGE: CPT | Mod: GC | Performed by: INTERNAL MEDICINE

## 2020-11-25 PROCEDURE — 250N000011 HC RX IP 250 OP 636: Performed by: ANESTHESIOLOGY

## 2020-11-25 PROCEDURE — 250N000011 HC RX IP 250 OP 636

## 2020-11-25 PROCEDURE — 87015 SPECIMEN INFECT AGNT CONCNTJ: CPT | Performed by: INTERNAL MEDICINE

## 2020-11-25 PROCEDURE — 88108 CYTOPATH CONCENTRATE TECH: CPT | Mod: TC | Performed by: INTERNAL MEDICINE

## 2020-11-25 PROCEDURE — 250N000009 HC RX 250: Performed by: INTERNAL MEDICINE

## 2020-11-25 PROCEDURE — 99232 SBSQ HOSP IP/OBS MODERATE 35: CPT | Performed by: PSYCHIATRY & NEUROLOGY

## 2020-11-25 PROCEDURE — 250N000012 HC RX MED GY IP 250 OP 636 PS 637: Performed by: STUDENT IN AN ORGANIZED HEALTH CARE EDUCATION/TRAINING PROGRAM

## 2020-11-25 PROCEDURE — 87186 SC STD MICRODIL/AGAR DIL: CPT | Performed by: INTERNAL MEDICINE

## 2020-11-25 PROCEDURE — 87210 SMEAR WET MOUNT SALINE/INK: CPT | Performed by: INTERNAL MEDICINE

## 2020-11-25 PROCEDURE — 87081 CULTURE SCREEN ONLY: CPT | Performed by: INTERNAL MEDICINE

## 2020-11-25 PROCEDURE — 31622 DX BRONCHOSCOPE/WASH: CPT | Performed by: INTERNAL MEDICINE

## 2020-11-25 PROCEDURE — 200N000001 HC R&B ICU

## 2020-11-25 PROCEDURE — 250N000011 HC RX IP 250 OP 636: Performed by: INTERNAL MEDICINE

## 2020-11-25 PROCEDURE — 87077 CULTURE AEROBIC IDENTIFY: CPT | Performed by: INTERNAL MEDICINE

## 2020-11-25 PROCEDURE — 250N000009 HC RX 250: Performed by: STUDENT IN AN ORGANIZED HEALTH CARE EDUCATION/TRAINING PROGRAM

## 2020-11-25 PROCEDURE — 89051 BODY FLUID CELL COUNT: CPT | Performed by: INTERNAL MEDICINE

## 2020-11-25 PROCEDURE — 99221 1ST HOSP IP/OBS SF/LOW 40: CPT | Performed by: UROLOGY

## 2020-11-25 RX ORDER — FENTANYL CITRATE 50 UG/ML
100 INJECTION, SOLUTION INTRAMUSCULAR; INTRAVENOUS ONCE
Status: COMPLETED | OUTPATIENT
Start: 2020-11-25 | End: 2020-11-25

## 2020-11-25 RX ORDER — FENTANYL CITRATE 50 UG/ML
INJECTION, SOLUTION INTRAMUSCULAR; INTRAVENOUS
Status: COMPLETED
Start: 2020-11-25 | End: 2020-11-25

## 2020-11-25 RX ADMIN — QUETIAPINE 75 MG: 25 TABLET ORAL at 08:16

## 2020-11-25 RX ADMIN — QUETIAPINE 75 MG: 25 TABLET ORAL at 22:10

## 2020-11-25 RX ADMIN — HEPARIN SODIUM 5000 UNITS: 5000 INJECTION, SOLUTION INTRAVENOUS; SUBCUTANEOUS at 20:06

## 2020-11-25 RX ADMIN — PYRIDOSTIGMINE BROMIDE 30 MG: 60 TABLET ORAL at 22:10

## 2020-11-25 RX ADMIN — FENTANYL CITRATE 100 MCG: 50 INJECTION, SOLUTION INTRAMUSCULAR; INTRAVENOUS at 16:05

## 2020-11-25 RX ADMIN — PREDNISONE 60 MG: 20 TABLET ORAL at 08:16

## 2020-11-25 RX ADMIN — AZATHIOPRINE 50 MG: 50 TABLET ORAL at 08:17

## 2020-11-25 RX ADMIN — PANTOPRAZOLE SODIUM 40 MG: 40 TABLET, DELAYED RELEASE ORAL at 08:16

## 2020-11-25 RX ADMIN — CHLORHEXIDINE GLUCONATE 0.12% ORAL RINSE 15 ML: 1.2 LIQUID ORAL at 20:08

## 2020-11-25 RX ADMIN — NYSTATIN 500000 UNITS: 100000 SUSPENSION ORAL at 22:09

## 2020-11-25 RX ADMIN — HEPARIN SODIUM 5000 UNITS: 5000 INJECTION, SOLUTION INTRAVENOUS; SUBCUTANEOUS at 12:00

## 2020-11-25 RX ADMIN — FENTANYL CITRATE 25 MCG: 50 INJECTION, SOLUTION INTRAMUSCULAR; INTRAVENOUS at 16:06

## 2020-11-25 RX ADMIN — HEPARIN SODIUM 5000 UNITS: 5000 INJECTION, SOLUTION INTRAVENOUS; SUBCUTANEOUS at 04:37

## 2020-11-25 RX ADMIN — SULFAMETHOXAZOLE AND TRIMETHOPRIM 80 MG: 200; 40 SUSPENSION ORAL at 08:17

## 2020-11-25 RX ADMIN — CHLORHEXIDINE GLUCONATE 0.12% ORAL RINSE 15 ML: 1.2 LIQUID ORAL at 08:16

## 2020-11-25 RX ADMIN — ONDANSETRON 8 MG: 4 TABLET, ORALLY DISINTEGRATING ORAL at 08:16

## 2020-11-25 RX ADMIN — NYSTATIN 500000 UNITS: 100000 SUSPENSION ORAL at 06:50

## 2020-11-25 RX ADMIN — FUROSEMIDE 40 MG: 10 INJECTION, SOLUTION INTRAVENOUS at 08:17

## 2020-11-25 RX ADMIN — NYSTATIN 500000 UNITS: 100000 SUSPENSION ORAL at 11:59

## 2020-11-25 RX ADMIN — MIDAZOLAM HYDROCHLORIDE 3 MG: 1 INJECTION, SOLUTION INTRAMUSCULAR; INTRAVENOUS at 16:08

## 2020-11-25 RX ADMIN — ONDANSETRON 4 MG: 2 INJECTION INTRAMUSCULAR; INTRAVENOUS at 18:06

## 2020-11-25 RX ADMIN — NYSTATIN 500000 UNITS: 100000 SUSPENSION ORAL at 18:05

## 2020-11-25 RX ADMIN — MIDAZOLAM HYDROCHLORIDE 4 MG: 1 INJECTION, SOLUTION INTRAMUSCULAR; INTRAVENOUS at 16:07

## 2020-11-25 ASSESSMENT — MIFFLIN-ST. JEOR: SCORE: 1594.38

## 2020-11-25 ASSESSMENT — ACTIVITIES OF DAILY LIVING (ADL)
ADLS_ACUITY_SCORE: 24
ADLS_ACUITY_SCORE: 16
ADLS_ACUITY_SCORE: 24
ADLS_ACUITY_SCORE: 24

## 2020-11-25 NOTE — PLAN OF CARE
Neuro: PERRL. Able to follow commands. Able to make needs known.,  Resp: LS clear/dim. Moderate amount of secretions  From trache.  Cardio: WDL, NSR  GI/: NPO, TF stopped due to vomiting, abdominal XR ordered, . Weston patent with pink/red UOP. RT removed due to thick creamy stools.  Pain: pt reports abdominal pain , tylenol given from day shift RN prior the shift change.  Skin: Rosalia area excoriated and macerated powder applied.  Events:Patient vomited x 3, MD notified, Zofran order was modified and given without relief. order for NG placed and plan for removing NJ.

## 2020-11-25 NOTE — PLAN OF CARE
Problem: Adult Inpatient Plan of Care  Goal: Plan of Care Review  11/24/2020 1957 by Mateo Fritz RN  Outcome: Improving  11/24/2020 1957 by Mateo Fritz RN  Outcome: Improving  Goal: Patient-Specific Goal (Individualized)  11/24/2020 1957 by Mateo Fritz RN  Outcome: Improving  11/24/2020 1957 by Mateo Fritz RN  Outcome: Improving  Goal: Absence of Hospital-Acquired Illness or Injury  11/24/2020 1957 by Mateo Fritz RN  Outcome: Improving  11/24/2020 1957 by Mateo Fritz RN  Outcome: Improving  Intervention: Identify and Manage Fall Risk  Recent Flowsheet Documentation  Taken 11/24/2020 1600 by Mateo Fritz RN  Safety Promotion/Fall Prevention:   activity supervised   clutter free environment maintained   fall prevention program maintained   lighting adjusted   sitter at bedside  Taken 11/24/2020 0800 by Mateo Fritz RN  Safety Promotion/Fall Prevention:   activity supervised   clutter free environment maintained   fall prevention program maintained   lighting adjusted   sitter at bedside  Intervention: Prevent Skin Injury  Recent Flowsheet Documentation  Taken 11/24/2020 1800 by Mateo Fritz RN  Body Position: turned  Taken 11/24/2020 1600 by Mateo Fritz, RN  Body Position: turned  Taken 11/24/2020 1400 by aMteo Fritz RN  Body Position: turned  Taken 11/24/2020 1200 by Mateo Fritz, RN  Body Position: turned  Taken 11/24/2020 1000 by Mateo Fritz RN  Body Position: turned  Taken 11/24/2020 0800 by Mateo Fritz RN  Body Position: turned  Intervention: Prevent and Manage VTE (Venous Thromboembolism) Risk  Recent Flowsheet Documentation  Taken 11/24/2020 1600 by Mateo Fritz, RN  VTE Prevention/Management: pneumatic compression device  Taken 11/24/2020 0800 by Mateo Fritz, RN  VTE Prevention/Management: pneumatic compression device  Intervention: Prevent Infection  Recent Flowsheet Documentation  Taken 11/24/2020 1600 by Mateo Fritz RN  Infection  Prevention:   cohorting utilized   environmental surveillance performed   equipment surfaces disinfected   hand hygiene promoted   personal protective equipment utilized   rest/sleep promoted   visitors restricted/screened  Taken 11/24/2020 0800 by Mateo Fritz, RN  Infection Prevention:   cohorting utilized   environmental surveillance performed   equipment surfaces disinfected   hand hygiene promoted   personal protective equipment utilized   rest/sleep promoted   visitors restricted/screened  Goal: Optimal Comfort and Wellbeing  11/24/2020 1957 by Mateo Fritz, RN  Outcome: Improving  11/24/2020 1957 by Mateo Fritz, RN  Outcome: Improving  Intervention: Provide Person-Centered Care  Recent Flowsheet Documentation  Taken 11/24/2020 1600 by Mateo Fritz, RN  Trust Relationship/Rapport:   care explained   choices provided   emotional support provided   questions encouraged   reassurance provided   thoughts/feelings acknowledged  Taken 11/24/2020 0800 by Mateo Fritz, RN  Trust Relationship/Rapport:   care explained   choices provided   emotional support provided   questions encouraged   reassurance provided   thoughts/feelings acknowledged  Goal: Readiness for Transition of Care  11/24/2020 1957 by Mateo Fritz, RN  Outcome: Improving  11/24/2020 1957 by Mateo Fritz, RN  Outcome: Improving

## 2020-11-25 NOTE — PLAN OF CARE
A/Ox4. VSS ex. Pt tolerating vent with trach. Trach with small to moderate thick brown/red output. Increased O2 needs this morning after lying bed flat for t/r, desatted to low 80s, lung sounds increasingly coarse in bilateral lobes after. FiO2 increased from 40% to 80% and Peep increased from 8 to 10. NG placed to LIS with 700ml bile/green/brown thick output. No emesis or stool this shift. Weston with approx 300 ml red output. BG 120s, no coverage needed.

## 2020-11-25 NOTE — PROGRESS NOTES
[facility]    Stroke/Neurocritical Care Progress Note    Interval Events  Neuroexam remains stable, however noted to have some thick respiratory secretions and had an episode of hypoxia with O2 sats in low 80s with FiO2 increase from 40% to 80% and Peep increased from 8 to 10. Remains afebrile with stable leucocytosis (~18K). Last NIF documented in flow sheets is -20 on 11/23 at 3 PM       Impression  1. 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 Anti CRMP-5 negative with remainder paraneoplastic panel pending.  Encouraging response to PLEX x5 (11/12-19), methylpred 500 mg IV q12 x10 (11/12-16), and IVIg x4 (11/19-22) with regard to best bulbar and limb exams over the past few days, but delayed respiratory status response necessitated trach. Hopefully will recover quickly enough that he does not need a PEG and can wean off of NG in the next few weeks.    2. Respiratory paralysis- secondary to #1, following daily inspiratory forces/tidal volumes as tolerated in response to therapy    3. Pain/sedation- to tolerate trach    4. Lung consolidation- completed 7 day course of unasyn    Plan  [] Prednisone down-taper ordered: 60 mg daily per tube, plan to start down-titration at one month from start  [] Azathiaprine up-titration ordered: 50 mg daily, increase by 50 mg every 2 weeks until goal dose of  ~175 mg daily.  [] Daily MIP and tidal volume as tolerated per RT  [] Start pyridostigmine 30mg TID  []  "Follow up with Dr. Greenwood of [facility] post-hospital for ongoing management (he may have a [place] or [place] outreach clinic available for this patient from [place])  Remainder of cares per Intensivist    The Stroke/Neurocritical Care Staff is Dr. Delaney.     Miguel Hernandez MD  Vascular Neurology Fellow  To page me or covering stroke neurology team member, click here: [email or web address]   Choose \"On Call\" tab at top, then search dropdown box for \"Neurology Adult\", select location, press Enter, then look for stroke/neuro ICU/telestroke.    ______________________________________________________    Medications   Home Meds  Prior to Admission medications    Medication Sig Start Date End Date Taking? Authorizing Provider   amoxicillin-clavulanate (AUGMENTIN) 875-125 MG tablet Take 1 tablet by mouth 2 times daily X 10 days (started 11/9/2020)   Yes Unknown, Entered By History   HYDROcodone-acetaminophen (NORCO) 5-325 MG tablet Take 1-2 tablets by mouth 2 times daily as needed for severe pain    Yes Unknown, Entered By History   predniSONE (DELTASONE) 20 MG tablet Take 20 mg by mouth 2 times daily X 5 days (started 11/9/2020)   Yes Unknown, Entered By History   tamsulosin (FLOMAX) 0.4 MG capsule Take 0.4 mg by mouth daily   Yes Unknown, Entered By History       Scheduled Meds    [Held by provider] amLODIPine  10 mg Per Feeding Tube Daily     zz extemporaneous template  50 mg Oral or Feeding Tube Daily    Followed by     [START ON 12/8/2020] zz extemporaneous template  100 mg Oral or Feeding Tube Daily    Followed by     [START ON 12/22/2020] zz extemporaneous template  150 mg Oral or Feeding Tube Daily    Followed by     [START ON 1/5/2021] zz extemporaneous template  175 mg Oral or Feeding Tube Daily     chlorhexidine  15 mL Mouth/Throat Q12H     heparin ANTICOAGULANT  5,000 Units Subcutaneous Q8H     insulin aspart  1-6 Units Subcutaneous Q4H     nystatin  500,000 Units Mouth/Throat 4x Daily     ondansetron  4 mg Intravenous Once "     pantoprazole  40 mg Oral QAM     predniSONE  60 mg Oral or Feeding Tube Daily    Followed by     [START ON 12/12/2020] predniSONE  50 mg Oral or Feeding Tube Daily     QUEtiapine  75 mg Oral BID     sulfamethoxazole-trimethoprim  10 mL Per Feeding Tube Daily       Infusion Meds    sodium chloride 20 mL/hr at 11/24/20 0800       PRN Meds  acetaminophen **OR** acetaminophen, albuterol, artificial tears ophthalmic solution, glucose **OR** dextrose **OR** glucagon, hydrogen peroxide, labetalol, miconazole, miconazole, midazolam, naloxone **OR** naloxone **OR** naloxone **OR** naloxone, ondansetron **OR** ondansetron, senna-docusate **OR** senna-docusate       PHYSICAL EXAMINATION  Temp:  [98.2  F (36.8  C)-98.8  F (37.1  C)] 98.3  F (36.8  C)  Pulse:  [] 89  Resp:  [15-29] 25  BP: ()/() 121/77  FiO2 (%):  [40 %-80 %] 80 %  SpO2:  [87 %-100 %] 91 %     Date 11/13 11/14 11/15 11/16 11/17 11/18 11/19 11/20 11/21 11/22 11/23 11/24   [facility]/NIF -7 -4 -3 -5 -1 -- -- -- -- -- -20 --   Vital Capacity 290 180 240 320 500 -- -- -- -- -- 930 --       General:  patient seated upright in chair without any acute distress    HEENT:  normocephalic/atraumatic, edentulous, trach present  Cardio:  RRR  Pulmonary:  on mechanical ventilation  Abdomen:  soft, non-tender, non-distended  Extremities:  no edema, peripheral pulses palpable  Skin:  intact, warm/dry      Neurologic  Mental Status:  examined on no sedation. Eyes open spontaneously. Follows commands reliably, mouths/gestures/nods/shakes to answer questions, writes with left hand and uses letter board.  Cranial Nerves:  VFF, PERRL, minimal ptosis, conjugate rest gaze, intact horizontal eye movements, face symmetric, weak lip purse  Motor: restless, tapping hands and feet, movements still brisk, elbow flexion 5-/5, elbow extension 4+/5, finger flexion 5/5, finger extension 5/5. Hip flexion 4/5, knee flexion 4+/5, knee extension 4+/5, ankle dorsiflexion 5/5, ankle  plantarflexion 5/5. No fasciculations, twitches, or abnormal movements.  Reflexes:  2+ intact and symmetric reflexes in bilateral biceps, brachioradialis, patellae, achilles, toes downgoing  Sensory:  light touch sensation intact and symmetric throughout upper and lower extremities  Coordination:  not tested  Station/Gait:  not tested    Imaging  I personally reviewed all imaging; relevant findings per HPI.     Lab Results Data   CBC  Recent Labs   Lab 11/24/20  1020 11/24/20  0453 11/22/20  0530 11/21/20  0400   WBC 18.7*  --  14.5* 11.7*   RBC 3.89*  --  3.55* 3.32*   HGB 11.5* 11.0* 10.7* 10.0*   HCT 35.2*  --  31.9* 29.8*    232 156 118*     Basic Metabolic Panel    Recent Labs   Lab 11/24/20  1020 11/22/20  0530 11/21/20  0400    137 139   POTASSIUM 4.0 3.9 3.7   CHLORIDE 102 103 105   CO2 30 32 33*   BUN 32* 34* 34*   CR 0.70 0.68 0.67   * 124* 130*   JESSICA 8.2* 7.4* 7.2*     Liver Panel  Recent Labs   Lab 11/24/20  1020   PROTTOTAL 7.3   ALBUMIN 3.0*   BILITOTAL 0.8   ALKPHOS 164*   AST 98*   *     INR    Recent Labs   Lab Test 11/17/20  0400   INR 1.20*      Lipid Profile  No lab results found.  A1C    Recent Labs   Lab Test 11/14/20  0402   A1C 6.0*     Troponin I    No results for input(s): TROPI in the last 168 hours.

## 2020-11-25 NOTE — PROGRESS NOTES
Care Management Follow Up    Length of Stay (days): 15    Expected Discharge Date: 11/27/20  Expected Time of Departure: Date/time unknown  Concerns to be Addressed: discharge planning;adjustment to diagnosis/illness     Patient plan of care discussed at interdisciplinary rounds: Yes    Anticipated Discharge Disposition: LTACH  Disposition Comments: Discharge plan remains unknown at this time; Pt resides in a [facility] in [place], [place] that family is hopeful he can return to at some point.  Anticipated Discharge Services: Transportation Services  Anticipated Discharge DME: Oxygen(trach/peg)    Patient/family educated on Medicare website which has current facility and service quality ratings: yes  Education Provided on the Discharge Plan:  no  Patient/Family in Agreement with the Plan: other (see comments)    Referrals Placed by CM/SW: Transportation([facility] when bed available)  Private pay costs discussed: not at this time    Additional Information:  Spoke with [facility] LTACH rep Rodrigo ([phone]), who confirms pt is on his list of potential LTACH candidates.  However there are no beds available today or tomorrow at [facility]. CM-RN can call on Friday 11/27/2020 to inquire on bed availability status.   Karen Velazquez RN, BSN, PHN  [facility]  Inpatient Care Management  Direct Mobile: [phone]

## 2020-11-25 NOTE — PROCEDURES
ICU BEDSIDE PROCEDURE   NAME: Roosevelt Burris   MRN: [id number]  : 1960  Diagnosis:  Atelectasis of RLL following aspiration event with resultant hypoxia.  Procedure: Flexible bronchoscopy   Specimen: RLL BAL  Medications: 7 mg Versed, 150 mcg Fentanyl, 9 ml 1% Lidocaine via trachea.  Procedure: A timeout was called to review the case and patient information. The patient was positioned supine. The flexible bronchoscope was advanced down the tracheostomy. The right tracheobronchial tree was inspected closely to the level of the subsegmental bronchi.  Secretions were found to be thick and creamy.  These were lavaged with 160 ml saline and evacuated without difficulty. A sample was sent for gram stain and culture. The procedure was completed and the patient tolerated the procedure well and without complications.    Findings: Moderate thick tan secretions in the upper airways, consistent with aspiration of tube feeds  Plan: CXR now, await culture results    Dr. Lucia was present for the entire procedure.    Rey Webb MD   ICU fellow

## 2020-11-25 NOTE — OR NURSING
ICU Bronch by Dr Martinez.. Sedation and monitoring by ICU RN. RT with assist.  BAL of RLL collected. Specimen labeled and double checked with ICU RN. ICU to collect Orders and transport to lab

## 2020-11-25 NOTE — CONSULTS
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 He is s/p trach, and albarado was placed for urinary retention. Pt was agitated 2 nights ago and pulled his albarado out. Albarado was replaced but urine has been red tinged since. He is on lovenox for DVT ppx. CT C/A/P was obtained today for leukocytosis noted on CBC yesterday, and noted a small bladder clot with an otherwise decompressed bladder. Urology was consulted for management     Patient denies prior urologic hx or history of gross hematuria. He has a history of smoking for 20 years - up to this admission.            Past Medical History:     Noted for above; remainder reviewed in EMR  Past Medical History:   Diagnosis Date     Myasthenia gravis with exacerbation (H) 11/12/2020    EMG confirmed, antibodies pending            Past Surgical History:     Noted for trach during this admission, no pertinent PShx; remainder reviewed in EMR         Social History:     Smoker, some etoh         Family History:     Not pertinent urologic FHx ; remainder reviewed in EMR  No family history on file.         Allergies:     Allergies   Allergen Reactions     Dexmedetomidine      Bradycardia even at very low dose              Medications:     Noted for flomax 0.4 mg PTA, lovenox ppx during this admission; remainder reviewed in EMR          Review of Systems:      ROS: 10 point ROS neg other than the symptoms noted above in  the [facility]           Physical Exam:     VS:  T: 98.9    HR: 116    BP: 109/77    RR: 24   GEN:  Alert and oriented, somewhat sedated due to having had bronchoscopy prior to this interview. S/p trach. In NAD.    LUNGS: s/p trach, on vent  ABD:  Soft.  NT.  ND.  No rebound or guarding.  No masses. Several small bruises c/w sites of lovenox shots  :  circumcised.  Normal penile shaft.  Testicles descended bilaterally. Albarado in place with dark urine    SKIN:  Warm.  Dry.  No rashes.  NEURO:  A&O. CN grossly intact.            Data:   All laboratory data reviewed:    Heme:  Recent Labs   Lab 11/24/20  1020 11/24/20  0453 11/22/20  0530 11/21/20  0400 11/20/20  0455 11/20/20  0455   WBC 18.7*  --  14.5* 11.7*  --  16.9*   HGB 11.5* 11.0* 10.7* 10.0*   < > 11.7*    232 156 118*  --  121*    < > = values in this interval not displayed.     Chem:  Recent Labs   Lab 11/24/20  1020 11/22/20  0530 11/21/20  0400 11/20/20  0455   POTASSIUM Canceled, Test credited  4.0 3.9 3.7 3.5   CR Canceled, Test credited  0.70 0.68 0.67 0.68       All pertinent imaging reviewed, noted for above.          Procedure:     Albarado removed  Prepped and draped groin in usual sterile fashion  24F 6 eye catheter placed into well lubricated urethra with return of cherry colored urine  Irrigated bladder with 1L of saline with return of 10 ml of clot, and urine pink lemonade color by the end of irrigation  6 eye catheter was then replaced with a 22F three way catheter. 10 ml of sterile water was then instilled into the albarado balloon and the inflow port was plugged. CBI not started given urine clearance             Impression and Plan:     Impression:   Roosevelt Burris is a 60 year old male with PMH of recently diagnosed myasthenia gravis with respiratory failure and urologic hx of acute urinary retention during this admission, prior hx of nocturia on flomax, and a traumatic catheter removal 2 days ago resulting in gross hematuria with a small  "bladder clot on CT done today. Bladder irrigated to clear urine with removal of clot. 22F three way catheter placed but CBI not started given urine clearance      Plan:     - Continue with current catheter. If hematuria recurs (cherry colored urine and/or clots), irrigate bladder with saline and start CBI via inflow port     - No indication for complete hematuria workup at this time given the obvious traumatic etiology for his gross hematuria and the absence of any suspicious findings aside from the clot on today's CT    - Assuming no recurrence of hematuria, we will defer timing of catheter removal to the primary team, but would resume the patient's home medication of flomax to reduce the chances of urinary retention    - Urology will follow peripherally. Please contact resident/PA on call with any questions or concerns.         This patient's exam findings, labs, and imaging discussed with urology staff surgeon, Dr. Moon, who developed the treatment plan.    Chiquis De MD MS  Urology Resident    For questions re this patient, please see \"contacting urology team\" instructions below, or refer to the call sheet     Contacting the Urology Team     Please use the following job codes to reach the Urology Team. Note that you must use an in house phone and that job codes cannot receive text pages.     On weekdays, dial [phone] (or star-star-star [phone] on the new [employer] telephones) then [phone] to reach the Adult Urology resident or PA on call    On weekdays, dial [phone] (or star-star-star [phone] on the new [employer] telephones) then [phone] to reach the Pediatric Urology resident    On weeknights and weekends, dial [phone] (or star-star-star 777 on the new [employer] telephones) then [phone] to reach the Urology resident on call (for both Adult and Pediatrics)    Alternatively, you can reach urology pagers directly using the [employer] as follows:    Open Internet Explorer (you must be logged to the [employer] intranet)    On the [employer] home " "page, hover over the Resources menu (4th menu from the [place] symbol on the menu bar), which will reveal a submenu, then click \"In House Pagers and On Call Calendars\" (right column, 6th option from the bottom).    Click the drop down menu next to the Date, and scroll down to Urology/[facility], then click it. You should see a list of the residents assigned to each site, with a hyperlink to their pager (click the pager icon)                     "

## 2020-11-26 ENCOUNTER — APPOINTMENT (OUTPATIENT)
Dept: GENERAL RADIOLOGY | Facility: CLINIC | Age: 60
DRG: 003 | End: 2020-11-26
Attending: INTERNAL MEDICINE
Payer: COMMERCIAL

## 2020-11-26 LAB
ALBUMIN SERPL-MCNC: 2.5 G/DL (ref 3.4–5)
ALP SERPL-CCNC: 144 U/L (ref 40–150)
ALT SERPL W P-5'-P-CCNC: 183 U/L (ref 0–70)
ANION GAP SERPL CALCULATED.3IONS-SCNC: 5 MMOL/L (ref 3–14)
AST SERPL W P-5'-P-CCNC: 42 U/L (ref 0–45)
BASOPHILS # BLD AUTO: 0 10E9/L (ref 0–0.2)
BASOPHILS NFR BLD AUTO: 0.1 %
BILIRUB SERPL-MCNC: 0.6 MG/DL (ref 0.2–1.3)
BUN SERPL-MCNC: 37 MG/DL (ref 7–30)
CALCIUM SERPL-MCNC: 8.3 MG/DL (ref 8.5–10.1)
CHLORIDE SERPL-SCNC: 109 MMOL/L (ref 94–109)
CO2 SERPL-SCNC: 28 MMOL/L (ref 20–32)
CREAT SERPL-MCNC: 0.72 MG/DL (ref 0.66–1.25)
DIFFERENTIAL METHOD BLD: ABNORMAL
EOSINOPHIL # BLD AUTO: 0 10E9/L (ref 0–0.7)
EOSINOPHIL NFR BLD AUTO: 0 %
ERYTHROCYTE [DISTWIDTH] IN BLOOD BY AUTOMATED COUNT: 15.1 % (ref 10–15)
GFR SERPL CREATININE-BSD FRML MDRD: >90 ML/MIN/{1.73_M2}
GLUCOSE BLDC GLUCOMTR-MCNC: 117 MG/DL (ref 70–99)
GLUCOSE BLDC GLUCOMTR-MCNC: 119 MG/DL (ref 70–99)
GLUCOSE BLDC GLUCOMTR-MCNC: 137 MG/DL (ref 70–99)
GLUCOSE BLDC GLUCOMTR-MCNC: 138 MG/DL (ref 70–99)
GLUCOSE BLDC GLUCOMTR-MCNC: 92 MG/DL (ref 70–99)
GLUCOSE BLDC GLUCOMTR-MCNC: 93 MG/DL (ref 70–99)
GLUCOSE SERPL-MCNC: 98 MG/DL (ref 70–99)
HCT VFR BLD AUTO: 33.6 % (ref 40–53)
HGB BLD-MCNC: 11.1 G/DL (ref 13.3–17.7)
IMM GRANULOCYTES # BLD: 0.1 10E9/L (ref 0–0.4)
IMM GRANULOCYTES NFR BLD: 0.6 %
LYMPHOCYTES # BLD AUTO: 0.9 10E9/L (ref 0.8–5.3)
LYMPHOCYTES NFR BLD AUTO: 5.1 %
MAGNESIUM SERPL-MCNC: 2.7 MG/DL (ref 1.6–2.3)
MCH RBC QN AUTO: 30.2 PG (ref 26.5–33)
MCHC RBC AUTO-ENTMCNC: 33 G/DL (ref 31.5–36.5)
MCV RBC AUTO: 92 FL (ref 78–100)
MONOCYTES # BLD AUTO: 0.8 10E9/L (ref 0–1.3)
MONOCYTES NFR BLD AUTO: 4.7 %
NEUTROPHILS # BLD AUTO: 15.6 10E9/L (ref 1.6–8.3)
NEUTROPHILS NFR BLD AUTO: 89.5 %
NRBC # BLD AUTO: 0 10*3/UL
NRBC BLD AUTO-RTO: 0 /100
PHOSPHATE SERPL-MCNC: 3.3 MG/DL (ref 2.5–4.5)
PLATELET # BLD AUTO: 304 10E9/L (ref 150–450)
POTASSIUM SERPL-SCNC: 3.8 MMOL/L (ref 3.4–5.3)
PROT SERPL-MCNC: 6.9 G/DL (ref 6.8–8.8)
RBC # BLD AUTO: 3.67 10E12/L (ref 4.4–5.9)
SODIUM SERPL-SCNC: 142 MMOL/L (ref 133–144)
WBC # BLD AUTO: 17.4 10E9/L (ref 4–11)

## 2020-11-26 PROCEDURE — 250N000011 HC RX IP 250 OP 636: Performed by: ANESTHESIOLOGY

## 2020-11-26 PROCEDURE — 250N000012 HC RX MED GY IP 250 OP 636 PS 637: Performed by: STUDENT IN AN ORGANIZED HEALTH CARE EDUCATION/TRAINING PROGRAM

## 2020-11-26 PROCEDURE — 250N000011 HC RX IP 250 OP 636: Performed by: PHYSICIAN ASSISTANT

## 2020-11-26 PROCEDURE — 200N000001 HC R&B ICU

## 2020-11-26 PROCEDURE — 250N000013 HC RX MED GY IP 250 OP 250 PS 637: Performed by: PHYSICIAN ASSISTANT

## 2020-11-26 PROCEDURE — 258N000003 HC RX IP 258 OP 636: Performed by: PHYSICIAN ASSISTANT

## 2020-11-26 PROCEDURE — 999N000009 HC STATISTIC AIRWAY CARE

## 2020-11-26 PROCEDURE — 999N000157 HC STATISTIC RCP TIME EA 10 MIN

## 2020-11-26 PROCEDURE — 250N000011 HC RX IP 250 OP 636: Performed by: INTERNAL MEDICINE

## 2020-11-26 PROCEDURE — 250N000013 HC RX MED GY IP 250 OP 250 PS 637: Performed by: STUDENT IN AN ORGANIZED HEALTH CARE EDUCATION/TRAINING PROGRAM

## 2020-11-26 PROCEDURE — 85025 COMPLETE CBC W/AUTO DIFF WBC: CPT | Performed by: STUDENT IN AN ORGANIZED HEALTH CARE EDUCATION/TRAINING PROGRAM

## 2020-11-26 PROCEDURE — 83735 ASSAY OF MAGNESIUM: CPT | Performed by: INTERNAL MEDICINE

## 2020-11-26 PROCEDURE — 250N000013 HC RX MED GY IP 250 OP 250 PS 637: Performed by: INTERNAL MEDICINE

## 2020-11-26 PROCEDURE — 999N001017 HC STATISTIC GLUCOSE BY METER IP

## 2020-11-26 PROCEDURE — 71045 X-RAY EXAM CHEST 1 VIEW: CPT

## 2020-11-26 PROCEDURE — 250N000011 HC RX IP 250 OP 636

## 2020-11-26 PROCEDURE — 80053 COMPREHEN METABOLIC PANEL: CPT | Performed by: INTERNAL MEDICINE

## 2020-11-26 PROCEDURE — 84100 ASSAY OF PHOSPHORUS: CPT | Performed by: INTERNAL MEDICINE

## 2020-11-26 PROCEDURE — 94003 VENT MGMT INPAT SUBQ DAY: CPT

## 2020-11-26 PROCEDURE — 99232 SBSQ HOSP IP/OBS MODERATE 35: CPT | Performed by: PSYCHIATRY & NEUROLOGY

## 2020-11-26 PROCEDURE — C9113 INJ PANTOPRAZOLE SODIUM, VIA: HCPCS | Performed by: INTERNAL MEDICINE

## 2020-11-26 PROCEDURE — 258N000003 HC RX IP 258 OP 636

## 2020-11-26 PROCEDURE — 250N000009 HC RX 250: Performed by: STUDENT IN AN ORGANIZED HEALTH CARE EDUCATION/TRAINING PROGRAM

## 2020-11-26 PROCEDURE — 99291 CRITICAL CARE FIRST HOUR: CPT | Performed by: INTERNAL MEDICINE

## 2020-11-26 RX ORDER — PIPERACILLIN SODIUM, TAZOBACTAM SODIUM 3; .375 G/15ML; G/15ML
3.38 INJECTION, POWDER, LYOPHILIZED, FOR SOLUTION INTRAVENOUS EVERY 6 HOURS
Status: DISCONTINUED | OUTPATIENT
Start: 2020-11-26 | End: 2020-11-26

## 2020-11-26 RX ORDER — PIPERACILLIN SODIUM, TAZOBACTAM SODIUM 4; .5 G/20ML; G/20ML
4.5 INJECTION, POWDER, LYOPHILIZED, FOR SOLUTION INTRAVENOUS EVERY 6 HOURS
Status: COMPLETED | OUTPATIENT
Start: 2020-11-26 | End: 2020-12-04

## 2020-11-26 RX ADMIN — CHLORHEXIDINE GLUCONATE 0.12% ORAL RINSE 15 ML: 1.2 LIQUID ORAL at 07:43

## 2020-11-26 RX ADMIN — NYSTATIN 500000 UNITS: 100000 SUSPENSION ORAL at 18:40

## 2020-11-26 RX ADMIN — CHLORHEXIDINE GLUCONATE 0.12% ORAL RINSE 15 ML: 1.2 LIQUID ORAL at 20:06

## 2020-11-26 RX ADMIN — QUETIAPINE 75 MG: 25 TABLET ORAL at 21:43

## 2020-11-26 RX ADMIN — VANCOMYCIN HYDROCHLORIDE 1500 MG: 5 INJECTION, POWDER, LYOPHILIZED, FOR SOLUTION INTRAVENOUS at 15:56

## 2020-11-26 RX ADMIN — NYSTATIN 500000 UNITS: 100000 SUSPENSION ORAL at 06:05

## 2020-11-26 RX ADMIN — SULFAMETHOXAZOLE AND TRIMETHOPRIM 80 MG: 200; 40 SUSPENSION ORAL at 08:05

## 2020-11-26 RX ADMIN — MIDAZOLAM HYDROCHLORIDE 1 MG: 1 INJECTION, SOLUTION INTRAMUSCULAR; INTRAVENOUS at 02:59

## 2020-11-26 RX ADMIN — HEPARIN SODIUM 5000 UNITS: 5000 INJECTION, SOLUTION INTRAVENOUS; SUBCUTANEOUS at 12:29

## 2020-11-26 RX ADMIN — PYRIDOSTIGMINE BROMIDE 60 MG: 60 TABLET ORAL at 21:43

## 2020-11-26 RX ADMIN — SODIUM CHLORIDE: 9 INJECTION, SOLUTION INTRAVENOUS at 15:40

## 2020-11-26 RX ADMIN — HEPARIN SODIUM 5000 UNITS: 5000 INJECTION, SOLUTION INTRAVENOUS; SUBCUTANEOUS at 04:15

## 2020-11-26 RX ADMIN — AZATHIOPRINE 50 MG: 50 TABLET ORAL at 08:00

## 2020-11-26 RX ADMIN — PYRIDOSTIGMINE BROMIDE 60 MG: 60 TABLET ORAL at 13:54

## 2020-11-26 RX ADMIN — PREDNISONE 60 MG: 20 TABLET ORAL at 08:00

## 2020-11-26 RX ADMIN — PYRIDOSTIGMINE BROMIDE 30 MG: 60 TABLET ORAL at 06:08

## 2020-11-26 RX ADMIN — PIPERACILLIN AND TAZOBACTAM 4.5 G: 4; .5 INJECTION, POWDER, FOR SOLUTION INTRAVENOUS at 21:44

## 2020-11-26 RX ADMIN — NYSTATIN 500000 UNITS: 100000 SUSPENSION ORAL at 12:29

## 2020-11-26 RX ADMIN — MIDAZOLAM HYDROCHLORIDE 1 MG: 1 INJECTION, SOLUTION INTRAMUSCULAR; INTRAVENOUS at 07:44

## 2020-11-26 RX ADMIN — MIDAZOLAM HYDROCHLORIDE 1 MG: 1 INJECTION, SOLUTION INTRAMUSCULAR; INTRAVENOUS at 02:00

## 2020-11-26 RX ADMIN — PANTOPRAZOLE SODIUM 40 MG: 40 INJECTION, POWDER, FOR SOLUTION INTRAVENOUS at 08:00

## 2020-11-26 RX ADMIN — QUETIAPINE 75 MG: 25 TABLET ORAL at 08:00

## 2020-11-26 RX ADMIN — NYSTATIN 500000 UNITS: 100000 SUSPENSION ORAL at 23:52

## 2020-11-26 RX ADMIN — PIPERACILLIN SODIUM AND TAZOBACTAM SODIUM 3.38 G: 3; .375 INJECTION, POWDER, LYOPHILIZED, FOR SOLUTION INTRAVENOUS at 15:43

## 2020-11-26 RX ADMIN — HEPARIN SODIUM 5000 UNITS: 5000 INJECTION, SOLUTION INTRAVENOUS; SUBCUTANEOUS at 20:06

## 2020-11-26 ASSESSMENT — ACTIVITIES OF DAILY LIVING (ADL)
ADLS_ACUITY_SCORE: 24

## 2020-11-26 ASSESSMENT — MIFFLIN-ST. JEOR: SCORE: 1572.38

## 2020-11-26 NOTE — PROGRESS NOTES
[facility]    Stroke/Neurocritical Care Progress Note    Interval Events  Neuroexam remains stable.  Patient had an episode of aspiration on 11/25 resulting in intermittent hypoxic episodes, for which she had a bronchoscopy completed 11/25 with moderate thick tan secretions, consistent with aspiration of tube feeds.  Following bronchial lavage, the patient has been able to tolerate FiO2 decrements, currently on 50%. Last NIF documented in flow sheets is -20 on 11/23 at 3 PM     Impression  1. 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 Anti CRMP-5 negative with remainder paraneoplastic panel pending.  Encouraging response to PLEX x5 (11/12-19), methylpred 500 mg IV q12 x10 (11/12-16), and IVIg x4 (11/19-22) with regard to best bulbar and limb exams over the past few days, but delayed respiratory status response necessitated trach. Hopefully will recover quickly enough that he does not need a PEG and can wean off of NG in the next few weeks.  - Started on pyridostigmine on 11/25, increase dose to 60 mg 3 times daily    2. Respiratory paralysis- secondary to #1, following daily inspiratory forces/tidal volumes as tolerated in response to therapy    3. Lung consolidation- completed 7 day course of unasyn    4.  Aspiration pneumonitis -status post bronchoscopy on 11/25/2020.  Remains afebrile, with stable leukocytosis (17.4 from 18.7 yesterday) -however, high white count is likely a response to systemic  "steroids.    Plan  [] Prednisone down-taper ordered: 60 mg daily per tube, plan to start down-titration at one month from start  [] Azathiaprine up-titration ordered: 50 mg daily, increase by 50 mg every 2 weeks until goal dose of  ~175 mg daily.  [] Daily MIP and tidal volume as tolerated per RT  [] Continue with pyridostigmine at increased dose of 60mg TID  [] Follow up with Dr. Greenwood of [facility] post-hospital for ongoing management (he may have a [place] or [place] outreach clinic available for this patient from [place])  Remainder of cares per Intensivist    The Stroke/Neurocritical Care Staff is Dr. Delaney.     Miguel Hernandez MD  Vascular Neurology Fellow  To page me or covering stroke neurology team member, click here: AMCOM   Choose \"On Call\" tab at top, then search dropdown box for \"Neurology Adult\", select location, press Enter, then look for stroke/neuro ICU/telestroke.    ______________________________________________________    Medications   Home Meds  Prior to Admission medications    Medication Sig Start Date End Date Taking? Authorizing Provider   amoxicillin-clavulanate (AUGMENTIN) 875-125 MG tablet Take 1 tablet by mouth 2 times daily X 10 days (started 11/9/2020)   Yes Unknown, Entered By History   HYDROcodone-acetaminophen (NORCO) 5-325 MG tablet Take 1-2 tablets by mouth 2 times daily as needed for severe pain    Yes Unknown, Entered By History   predniSONE (DELTASONE) 20 MG tablet Take 20 mg by mouth 2 times daily X 5 days (started 11/9/2020)   Yes Unknown, Entered By History   tamsulosin (FLOMAX) 0.4 MG capsule Take 0.4 mg by mouth daily   Yes Unknown, Entered By History       Scheduled Meds    [Held by provider] amLODIPine  10 mg Per Feeding Tube Daily     zz extemporaneous template  50 mg Oral or Feeding Tube Daily    Followed by     [START ON 12/8/2020] zz extemporaneous template  100 mg Oral or Feeding Tube Daily    Followed by     [START ON 12/22/2020] zz extemporaneous template  150 mg " Oral or Feeding Tube Daily    Followed by     [START ON 1/5/2021] zz extemporaneous template  175 mg Oral or Feeding Tube Daily     chlorhexidine  15 mL Mouth/Throat Q12H     heparin ANTICOAGULANT  5,000 Units Subcutaneous Q8H     insulin aspart  1-6 Units Subcutaneous Q4H     nystatin  500,000 Units Mouth/Throat 4x Daily     ondansetron  4 mg Intravenous Once     pantoprazole (PROTONIX) IV  40 mg Intravenous Daily with breakfast     predniSONE  60 mg Oral or Feeding Tube Daily    Followed by     [START ON 12/12/2020] predniSONE  50 mg Oral or Feeding Tube Daily     pyridostigmine  30 mg Oral Q8H CAROLANN     QUEtiapine  75 mg Oral BID     sulfamethoxazole-trimethoprim  10 mL Per Feeding Tube Daily       Infusion Meds    sodium chloride 20 mL/hr at 11/26/20 0800       PRN Meds  acetaminophen **OR** acetaminophen, albuterol, artificial tears ophthalmic solution, glucose **OR** dextrose **OR** glucagon, hydrogen peroxide, labetalol, miconazole, miconazole, midazolam, naloxone **OR** naloxone **OR** naloxone **OR** naloxone, ondansetron **OR** ondansetron, senna-docusate **OR** senna-docusate       PHYSICAL EXAMINATION  Temp:  [97.8  F (36.6  C)-99.8  F (37.7  C)] 98.4  F (36.9  C)  Pulse:  [] 91  Resp:  [10-39] 19  BP: ()/() 100/77  FiO2 (%):  [50 %-80 %] 50 %  SpO2:  [92 %-98 %] 97 %     Date 11/13 11/14 11/15 11/16 11/17 11/18 11/19 11/20 11/21 11/22 11/23 11/24   MIP/NIF -7 -4 -3 -5 -1 -- -- -- -- -- -20 --   Vital Capacity 290 180 240 320 500 -- -- -- -- -- 930 --       General:  patient seated upright in chair without any acute distress    HEENT:  normocephalic/atraumatic, edentulous, trach present  Cardio:  RRR  Pulmonary:  on mechanical ventilation  Abdomen:  soft, non-tender, non-distended  Extremities:  no edema, peripheral pulses palpable  Skin:  intact, warm/dry      Neurologic  Mental Status:  examined on no sedation. Eyes open spontaneously. Follows commands reliably,  mouths/gestures/nods/shakes to answer questions, writes with left hand and uses letter board.  Cranial Nerves:  VFF, PERRL, minimal ptosis, conjugate rest gaze, intact horizontal eye movements, face symmetric, weak lip purse  Motor: restless, tapping hands and feet, movements still brisk, elbow flexion 5-/5, elbow extension 4+/5, finger flexion 5/5, finger extension 5/5. Hip flexion 4/5, knee flexion 4+/5, knee extension 4+/5, ankle dorsiflexion 5/5, ankle plantarflexion 5/5. No fasciculations, twitches, or abnormal movements.  Reflexes:  2+ intact and symmetric reflexes in bilateral biceps, brachioradialis, patellae, achilles, toes downgoing  Sensory:  light touch sensation intact and symmetric throughout upper and lower extremities  Coordination:  not tested  Station/Gait:  not tested    Imaging  I personally reviewed all imaging; relevant findings per HPI.     Lab Results Data   CBC  Recent Labs   Lab 11/26/20  0925 11/24/20  1020 11/24/20  0453 11/22/20  0530   WBC 17.4* 18.7*  --  14.5*   RBC 3.67* 3.89*  --  3.55*   HGB 11.1* 11.5* 11.0* 10.7*   HCT 33.6* 35.2*  --  31.9*    242 232 156     Basic Metabolic Panel    Recent Labs   Lab 11/24/20  1020 11/22/20  0530 11/21/20  0400   NA Canceled, Test credited  135 137 139   POTASSIUM Canceled, Test credited  4.0 3.9 3.7   CHLORIDE Canceled, Test credited  102 103 105   CO2 Canceled, Test credited  30 32 33*   BUN Canceled, Test credited  32* 34* 34*   CR Canceled, Test credited  0.70 0.68 0.67   GLC Canceled, Test credited  174* 124* 130*   JESSICA Canceled, Test credited  8.2* 7.4* 7.2*     Liver Panel  Recent Labs   Lab 11/24/20  1020   PROTTOTAL Canceled, Test credited  7.3   ALBUMIN Canceled, Test credited  3.0*   BILITOTAL Canceled, Test credited  0.8   ALKPHOS Canceled, Test credited  164*   AST Canceled, Test credited  98*   ALT Canceled, Test credited  244*     INR    Recent Labs   Lab Test 11/17/20  0400   INR 1.20*      Lipid Profile  No  lab results found.  A1C    Recent Labs   Lab Test 11/14/20  0402   A1C 6.0*     Troponin I    No results for input(s): TROPI in the last 168 hours.

## 2020-11-26 NOTE — PROGRESS NOTES
[facility] ICU RESPIRATORY NOTE        Date of Admission: 11/10/2020    Date of Intubation (most recent): trached 11/19/20    Reason for Mechanical Ventilation: Respiratory Failure    Number of Days on Mechanical Ventilation: 17    Met Criteria for Spontaneous Breathing Trial: No    Significant Events Today: FiO2 titrated down to 50%    ABG Results:   Recent Labs   Lab 11/23/20  1200   PH 7.49*   PCO2 43   PO2 60*   HCO3 33*   O2PER 40       Current Vent Settings: Ventilation Mode: CMV/AC  (Continuous Mandatory Ventilation/ Assist Control)  FiO2 (%): 50 %  Rate Set (breaths/minute): 14 breaths/min  Tidal Volume Set (mL): 500 mL  PEEP (cm H2O): 8 cmH2O  Oxygen Concentration (%): 50 %  Resp: 20      Plan: Continue vent support, Metaneb, and SBT tomorrow as tolerated.     Celena Callejas, RT

## 2020-11-26 NOTE — PROGRESS NOTES
[facility] ICU RESPIRATORY NOTE           Date of Admission: 11/10/2020     Date of Intubation (most recent): trached 11/19/20     Reason for Mechanical Ventilation: Respiratory failure      Number of Days on Mechanical Ventilation: 17     Met Criteria for Spontaneous Breathing Trial: yes        Significant Events Today: no acute changes overnight.      ABG Results:           Recent Labs   Lab 11/19/20  0011 11/18/20  1030 11/18/20  0542 11/17/20  1619 11/17/20  1330   PH 7.45 7.46* 7.45 7.32* 7.31*   PCO2 56* 49* 51* 63* 65*   PO2 89 143* 60* 82 74*   HCO3 39* 34* 35* 33* 33*   O2PER 60% 100%  --  100% 100%       Current Vent Settings: Ventilation Mode: CMV/AC  (Continuous Mandatory Ventilation/ Assist Control)  FiO2 (%): 40 %  Rate Set (breaths/minute): 16 breaths/min  Tidal Volume Set (mL): 500 mL  PEEP (cm H2O): 8 cmH2O  Pressure Support (cm H2O): 5 cmH2O  Oxygen Concentration (%): 40 %  Resp: 16        Skin Assessment: intact      Plan: continue to wean as tolerated.  Cl Means, RT
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
## 2020-11-26 NOTE — PHARMACY-VANCOMYCIN DOSING SERVICE
Pharmacy Vancomycin Initial Note  Date of Service 2020  Patient's  1960  60 year old, male    Indication: [facility] Pneumonia    Current estimated CrCl = CrCl cannot be calculated (This lab value cannot be used to calculate CrCl because it is not a number: Canceled, Test credited).    Creatinine for last 3 days  2020: 10:20 AM Creatinine Canceled, Test credited mg/dL; 10:20 AM Creatinine 0.70 mg/dL    Recent Vancomycin Level(s) for last 3 days  No results found for requested labs within last 72 hours.      Vancomycin IV Administrations (past 72 hours)                   vancomycin 1500 mg in 0.9% NaCl 250 ml intermittent infusion 1,500 mg (mg) 1,500 mg Given 20 1556                Nephrotoxins and other renal medications (From now, onward)    Start     Dose/Rate Route Frequency Ordered Stop    20 1600  vancomycin 1500 mg in 0.9% NaCl 250 ml intermittent infusion 1,500 mg      1,500 mg  over 90 Minutes Intravenous EVERY 12 HOURS 20 1527      20 1500  piperacillin-tazobactam (ZOSYN) 3.375 g vial to attach to  mL bag      3.375 g  over 30 Minutes Intravenous EVERY 6 HOURS 20 1520            Contrast Orders - past 72 hours (72h ago, onward)    None                Plan:  1.  Start vancomycin  1500 mg IV q12h.   2.  Goal Trough Level: 15-20 mg/L   3.  Pharmacy will check trough levels as appropriate in 1-2 Days.    4. Serum creatinine levels will be ordered daily for the first week of therapy and at least twice weekly for subsequent weeks.    5. [place] method utilized to dose vancomycin therapy: Method 1    Raina Kaplan [facility]

## 2020-11-26 NOTE — PLAN OF CARE
Patient remains on FiO2 of 80% at the end of shift, sats 92-93 %. Bronchoscopy at bedside this PM; Bladder irrigation and Weston catheter replacement by urology. U/O adequate. One large BM at the end of shift. Daughter updated over the phone.

## 2020-11-27 ENCOUNTER — APPOINTMENT (OUTPATIENT)
Dept: GENERAL RADIOLOGY | Facility: CLINIC | Age: 60
DRG: 003 | End: 2020-11-27
Attending: INTERNAL MEDICINE
Payer: COMMERCIAL

## 2020-11-27 LAB
ALBUMIN SERPL-MCNC: 2.5 G/DL (ref 3.4–5)
ALP SERPL-CCNC: 152 U/L (ref 40–150)
ALT SERPL W P-5'-P-CCNC: 152 U/L (ref 0–70)
ANION GAP SERPL CALCULATED.3IONS-SCNC: 8 MMOL/L (ref 3–14)
APTT PPP: 43 SEC (ref 22–37)
AST SERPL W P-5'-P-CCNC: 27 U/L (ref 0–45)
BASOPHILS # BLD AUTO: 0 10E9/L (ref 0–0.2)
BASOPHILS NFR BLD AUTO: 0.1 %
BILIRUB SERPL-MCNC: 0.4 MG/DL (ref 0.2–1.3)
BUN SERPL-MCNC: 33 MG/DL (ref 7–30)
CALCIUM SERPL-MCNC: 8.2 MG/DL (ref 8.5–10.1)
CHLORIDE SERPL-SCNC: 109 MMOL/L (ref 94–109)
CMV DNA SPEC NAA+PROBE-ACNC: NORMAL [IU]/ML
CMV DNA SPEC NAA+PROBE-LOG#: NORMAL {LOG_IU}/ML
CO2 SERPL-SCNC: 25 MMOL/L (ref 20–32)
COPATH REPORT: NORMAL
CREAT SERPL-MCNC: 0.68 MG/DL (ref 0.66–1.25)
DIFFERENTIAL METHOD BLD: ABNORMAL
EOSINOPHIL # BLD AUTO: 0 10E9/L (ref 0–0.7)
EOSINOPHIL NFR BLD AUTO: 0 %
ERYTHROCYTE [DISTWIDTH] IN BLOOD BY AUTOMATED COUNT: 14.7 % (ref 10–15)
FIBRINOGEN PPP-MCNC: 766 MG/DL (ref 200–420)
FLUAV H1 2009 PAND RNA SPEC QL NAA+PROBE: NEGATIVE
FLUAV H1 RNA SPEC QL NAA+PROBE: NEGATIVE
FLUAV H3 RNA SPEC QL NAA+PROBE: NEGATIVE
FLUAV RNA SPEC QL NAA+PROBE: NEGATIVE
FLUBV RNA SPEC QL NAA+PROBE: NEGATIVE
GFR SERPL CREATININE-BSD FRML MDRD: >90 ML/MIN/{1.73_M2}
GLUCOSE BLDC GLUCOMTR-MCNC: 112 MG/DL (ref 70–99)
GLUCOSE BLDC GLUCOMTR-MCNC: 115 MG/DL (ref 70–99)
GLUCOSE BLDC GLUCOMTR-MCNC: 130 MG/DL (ref 70–99)
GLUCOSE BLDC GLUCOMTR-MCNC: 76 MG/DL (ref 70–99)
GLUCOSE BLDC GLUCOMTR-MCNC: 81 MG/DL (ref 70–99)
GLUCOSE BLDC GLUCOMTR-MCNC: 96 MG/DL (ref 70–99)
GLUCOSE SERPL-MCNC: 93 MG/DL (ref 70–99)
HADV DNA SPEC QL NAA+PROBE: NEGATIVE
HADV DNA SPEC QL NAA+PROBE: NEGATIVE
HCT VFR BLD AUTO: 33.3 % (ref 40–53)
HGB BLD-MCNC: 11.1 G/DL (ref 13.3–17.7)
HMPV RNA SPEC QL NAA+PROBE: NEGATIVE
HPIV1 RNA SPEC QL NAA+PROBE: NEGATIVE
HPIV2 RNA SPEC QL NAA+PROBE: NEGATIVE
HPIV3 RNA SPEC QL NAA+PROBE: NEGATIVE
IMM GRANULOCYTES # BLD: 0.1 10E9/L (ref 0–0.4)
IMM GRANULOCYTES NFR BLD: 0.5 %
INR PPP: 1.08 (ref 0.86–1.14)
LYMPHOCYTES # BLD AUTO: 1 10E9/L (ref 0.8–5.3)
LYMPHOCYTES NFR BLD AUTO: 8.7 %
MAGNESIUM SERPL-MCNC: 2.5 MG/DL (ref 1.6–2.3)
MCH RBC QN AUTO: 30.2 PG (ref 26.5–33)
MCHC RBC AUTO-ENTMCNC: 33.3 G/DL (ref 31.5–36.5)
MCV RBC AUTO: 91 FL (ref 78–100)
MICROBIOLOGIST REVIEW: NORMAL
MONOCYTES # BLD AUTO: 0.6 10E9/L (ref 0–1.3)
MONOCYTES NFR BLD AUTO: 5.7 %
NEUTROPHILS # BLD AUTO: 9.3 10E9/L (ref 1.6–8.3)
NEUTROPHILS NFR BLD AUTO: 85 %
NRBC # BLD AUTO: 0 10*3/UL
NRBC BLD AUTO-RTO: 0 /100
PHOSPHATE SERPL-MCNC: 3.4 MG/DL (ref 2.5–4.5)
PLATELET # BLD AUTO: 337 10E9/L (ref 150–450)
POTASSIUM SERPL-SCNC: 3.4 MMOL/L (ref 3.4–5.3)
PROT SERPL-MCNC: 6.9 G/DL (ref 6.8–8.8)
RBC # BLD AUTO: 3.68 10E12/L (ref 4.4–5.9)
RHINOVIRUS RNA SPEC QL NAA+PROBE: NEGATIVE
RSV RNA SPEC QL NAA+PROBE: NEGATIVE
RSV RNA SPEC QL NAA+PROBE: NEGATIVE
SODIUM SERPL-SCNC: 142 MMOL/L (ref 133–144)
SPECIMEN SOURCE: NORMAL
SPECIMEN SOURCE: NORMAL
WBC # BLD AUTO: 10.9 10E9/L (ref 4–11)

## 2020-11-27 PROCEDURE — 250N000011 HC RX IP 250 OP 636: Performed by: INTERNAL MEDICINE

## 2020-11-27 PROCEDURE — 250N000013 HC RX MED GY IP 250 OP 250 PS 637: Performed by: INTERNAL MEDICINE

## 2020-11-27 PROCEDURE — 94150 VITAL CAPACITY TEST: CPT

## 2020-11-27 PROCEDURE — 250N000013 HC RX MED GY IP 250 OP 250 PS 637: Performed by: PHYSICIAN ASSISTANT

## 2020-11-27 PROCEDURE — 85610 PROTHROMBIN TIME: CPT | Performed by: INTERNAL MEDICINE

## 2020-11-27 PROCEDURE — C9113 INJ PANTOPRAZOLE SODIUM, VIA: HCPCS | Performed by: INTERNAL MEDICINE

## 2020-11-27 PROCEDURE — 999N000065 XR CHEST PORT 1 VW

## 2020-11-27 PROCEDURE — 83735 ASSAY OF MAGNESIUM: CPT | Performed by: INTERNAL MEDICINE

## 2020-11-27 PROCEDURE — 258N000003 HC RX IP 258 OP 636: Performed by: INTERNAL MEDICINE

## 2020-11-27 PROCEDURE — 999N001017 HC STATISTIC GLUCOSE BY METER IP

## 2020-11-27 PROCEDURE — 999N000009 HC STATISTIC AIRWAY CARE

## 2020-11-27 PROCEDURE — 99232 SBSQ HOSP IP/OBS MODERATE 35: CPT | Mod: GC | Performed by: PSYCHIATRY & NEUROLOGY

## 2020-11-27 PROCEDURE — 250N000011 HC RX IP 250 OP 636: Performed by: PHYSICIAN ASSISTANT

## 2020-11-27 PROCEDURE — 200N000001 HC R&B ICU

## 2020-11-27 PROCEDURE — 258N000003 HC RX IP 258 OP 636

## 2020-11-27 PROCEDURE — 84100 ASSAY OF PHOSPHORUS: CPT | Performed by: INTERNAL MEDICINE

## 2020-11-27 PROCEDURE — 85730 THROMBOPLASTIN TIME PARTIAL: CPT | Performed by: INTERNAL MEDICINE

## 2020-11-27 PROCEDURE — 71045 X-RAY EXAM CHEST 1 VIEW: CPT

## 2020-11-27 PROCEDURE — 250N000013 HC RX MED GY IP 250 OP 250 PS 637: Performed by: STUDENT IN AN ORGANIZED HEALTH CARE EDUCATION/TRAINING PROGRAM

## 2020-11-27 PROCEDURE — 94003 VENT MGMT INPAT SUBQ DAY: CPT

## 2020-11-27 PROCEDURE — 99291 CRITICAL CARE FIRST HOUR: CPT | Performed by: INTERNAL MEDICINE

## 2020-11-27 PROCEDURE — 85384 FIBRINOGEN ACTIVITY: CPT | Performed by: INTERNAL MEDICINE

## 2020-11-27 PROCEDURE — 85025 COMPLETE CBC W/AUTO DIFF WBC: CPT | Performed by: INTERNAL MEDICINE

## 2020-11-27 PROCEDURE — 80053 COMPREHEN METABOLIC PANEL: CPT | Performed by: INTERNAL MEDICINE

## 2020-11-27 PROCEDURE — 999N000157 HC STATISTIC RCP TIME EA 10 MIN

## 2020-11-27 PROCEDURE — 250N000011 HC RX IP 250 OP 636: Performed by: ANESTHESIOLOGY

## 2020-11-27 PROCEDURE — 250N000009 HC RX 250: Performed by: STUDENT IN AN ORGANIZED HEALTH CARE EDUCATION/TRAINING PROGRAM

## 2020-11-27 PROCEDURE — 250N000011 HC RX IP 250 OP 636

## 2020-11-27 PROCEDURE — 250N000012 HC RX MED GY IP 250 OP 636 PS 637: Performed by: STUDENT IN AN ORGANIZED HEALTH CARE EDUCATION/TRAINING PROGRAM

## 2020-11-27 RX ADMIN — PYRIDOSTIGMINE BROMIDE 30 MG: 60 TABLET ORAL at 22:23

## 2020-11-27 RX ADMIN — NYSTATIN 500000 UNITS: 100000 SUSPENSION ORAL at 17:35

## 2020-11-27 RX ADMIN — HEPARIN SODIUM 5000 UNITS: 5000 INJECTION, SOLUTION INTRAVENOUS; SUBCUTANEOUS at 19:31

## 2020-11-27 RX ADMIN — PYRIDOSTIGMINE BROMIDE 30 MG: 60 TABLET ORAL at 10:32

## 2020-11-27 RX ADMIN — PANTOPRAZOLE SODIUM 40 MG: 40 INJECTION, POWDER, FOR SOLUTION INTRAVENOUS at 08:38

## 2020-11-27 RX ADMIN — ACETAMINOPHEN 650 MG: 325 TABLET, FILM COATED ORAL at 08:44

## 2020-11-27 RX ADMIN — CARBOXYMETHYLCELLULOSE SODIUM 1 DROP: 5 SOLUTION/ DROPS OPHTHALMIC at 08:44

## 2020-11-27 RX ADMIN — PROCHLORPERAZINE EDISYLATE 5 MG: 5 INJECTION INTRAMUSCULAR; INTRAVENOUS at 23:34

## 2020-11-27 RX ADMIN — PIPERACILLIN AND TAZOBACTAM 4.5 G: 4; .5 INJECTION, POWDER, FOR SOLUTION INTRAVENOUS at 03:59

## 2020-11-27 RX ADMIN — SULFAMETHOXAZOLE AND TRIMETHOPRIM 80 MG: 200; 40 SUSPENSION ORAL at 08:40

## 2020-11-27 RX ADMIN — VANCOMYCIN HYDROCHLORIDE 1500 MG: 5 INJECTION, POWDER, LYOPHILIZED, FOR SOLUTION INTRAVENOUS at 04:43

## 2020-11-27 RX ADMIN — PIPERACILLIN AND TAZOBACTAM 4.5 G: 4; .5 INJECTION, POWDER, FOR SOLUTION INTRAVENOUS at 22:23

## 2020-11-27 RX ADMIN — PYRIDOSTIGMINE BROMIDE 30 MG: 60 TABLET ORAL at 17:35

## 2020-11-27 RX ADMIN — QUETIAPINE 75 MG: 25 TABLET ORAL at 08:37

## 2020-11-27 RX ADMIN — PIPERACILLIN AND TAZOBACTAM 4.5 G: 4; .5 INJECTION, POWDER, FOR SOLUTION INTRAVENOUS at 16:30

## 2020-11-27 RX ADMIN — NYSTATIN 500000 UNITS: 100000 SUSPENSION ORAL at 11:11

## 2020-11-27 RX ADMIN — ONDANSETRON 8 MG: 4 TABLET, ORALLY DISINTEGRATING ORAL at 20:34

## 2020-11-27 RX ADMIN — CHLORHEXIDINE GLUCONATE 0.12% ORAL RINSE 15 ML: 1.2 LIQUID ORAL at 19:31

## 2020-11-27 RX ADMIN — PREDNISONE 60 MG: 20 TABLET ORAL at 08:37

## 2020-11-27 RX ADMIN — PYRIDOSTIGMINE BROMIDE 30 MG: 60 TABLET ORAL at 14:58

## 2020-11-27 RX ADMIN — PYRIDOSTIGMINE BROMIDE 60 MG: 60 TABLET ORAL at 06:30

## 2020-11-27 RX ADMIN — HEPARIN SODIUM 5000 UNITS: 5000 INJECTION, SOLUTION INTRAVENOUS; SUBCUTANEOUS at 11:10

## 2020-11-27 RX ADMIN — QUETIAPINE 75 MG: 25 TABLET ORAL at 20:34

## 2020-11-27 RX ADMIN — SULFAMETHOXAZOLE AND TRIMETHOPRIM 400 MG: 80; 16 INJECTION, SOLUTION, CONCENTRATE INTRAVENOUS at 23:12

## 2020-11-27 RX ADMIN — HEPARIN SODIUM 5000 UNITS: 5000 INJECTION, SOLUTION INTRAVENOUS; SUBCUTANEOUS at 03:59

## 2020-11-27 RX ADMIN — CHLORHEXIDINE GLUCONATE 0.12% ORAL RINSE 15 ML: 1.2 LIQUID ORAL at 08:38

## 2020-11-27 RX ADMIN — SULFAMETHOXAZOLE AND TRIMETHOPRIM 400 MG: 80; 16 INJECTION, SOLUTION, CONCENTRATE INTRAVENOUS at 14:58

## 2020-11-27 RX ADMIN — AZATHIOPRINE 50 MG: 50 TABLET ORAL at 10:32

## 2020-11-27 RX ADMIN — PIPERACILLIN AND TAZOBACTAM 4.5 G: 4; .5 INJECTION, POWDER, FOR SOLUTION INTRAVENOUS at 09:03

## 2020-11-27 RX ADMIN — NYSTATIN 500000 UNITS: 100000 SUSPENSION ORAL at 06:30

## 2020-11-27 ASSESSMENT — ACTIVITIES OF DAILY LIVING (ADL)
ADLS_ACUITY_SCORE: 24
ADLS_ACUITY_SCORE: 21
ADLS_ACUITY_SCORE: 23
ADLS_ACUITY_SCORE: 21

## 2020-11-27 ASSESSMENT — MIFFLIN-ST. JEOR: SCORE: 1561.38

## 2020-11-27 NOTE — PLAN OF CARE
A/O x4. Vent to trach with intermittent O2 desat to low 80s when turned to Left side, requiring increased O2 and Peep needs, then decreased needs 2 hours later. Pt with intermittent productive cough disconnecting the vent x2, with small thick yellow mucous output from trach. LS coarse/ rhonchi but improved overnight. Weston with dark blake/red output, adequate output. NG to LIS with small bile/brown output. Tele SB to S, HR 55-80s.

## 2020-11-27 NOTE — PROGRESS NOTES
[facility]    Stroke/Neurocritical Care Progress Note    Interval Events  Neuroexam remains stable.  Patient had an episode of aspiration on 11/25 resulting in intermittent hypoxic episodes, for which he had a bronchoscopy completed 11/25 with moderate thick tan secretions, consistent with aspiration of tube feeds.  Following bronchial lavage, the patient has been able to tolerate FiO2 decrements, albeit, with intermittent desatting episodes upon turning to the left, currently on 50%. Last NIF documented in flow sheets is -20 on 11/23 at 3 PM     Impression  1. 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 Anti CRMP-5 negative with remainder paraneoplastic panel pending.  Encouraging response to PLEX x5 (11/12-19), methylpred 500 mg IV q12 x10 (11/12-16), and IVIg x4 (11/19-22) with regard to best bulbar and limb exams over the past few days, but delayed respiratory status response necessitated trach. Hopefully will recover quickly enough that he does not need a PEG and can wean off of NG in the next few weeks.  -C/w pyridostigmine [11/25], frequency adjusted to 30mg mg 6times daily    2. Respiratory paralysis- secondary to #1, following daily inspiratory forces/tidal volumes as tolerated in response to therapy    3. Lung consolidation- s/p 7 day course of unasyn.     4.  Aspiration Pneumonia - bronchoscopy completed 11/25 with moderate thick tan secretions, consistent with aspiration of tube feeds.  -  "Remains afebrile, with stable leukocytosis - however, high white count is likely a response to systemic steroids.   - Vanc/Zosyn started 11/26 for Aspiration PNA   - Remains on Bactrim for PCP prophylaxis     Plan  [] Prednisone down-taper ordered: 60 mg daily per tube, plan to start down-titration at one month from start  [] Azathioprine up-titration ordered: 50 mg daily, increase by 50 mg every 2 weeks until goal dose of  ~175 mg daily.  [] Daily MIP and tidal volume as tolerated per RT  [] Continue with pyridostigmine 30mg mg 6times daily  [] Follow up with Dr. Greenwood of [facility] post-hospital for ongoing management (he may have a [place] or [place] outreach clinic available for this patient from [place])  Remainder of cares per Intensivist    The Stroke/Neurocritical Care Staff is Dr. Montalvo.     Miguel Hernandez MD  Vascular Neurology Fellow  To page me or covering stroke neurology team member, click here: AMCOM   Choose \"On Call\" tab at top, then search dropdown box for \"Neurology Adult\", select location, press Enter, then look for stroke/neuro ICU/telestroke.    ______________________________________________________    Medications   Home Meds  Prior to Admission medications    Medication Sig Start Date End Date Taking? Authorizing Provider   amoxicillin-clavulanate (AUGMENTIN) 875-125 MG tablet Take 1 tablet by mouth 2 times daily X 10 days (started 11/9/2020)   Yes Unknown, Entered By History   HYDROcodone-acetaminophen (NORCO) 5-325 MG tablet Take 1-2 tablets by mouth 2 times daily as needed for severe pain    Yes Unknown, Entered By History   predniSONE (DELTASONE) 20 MG tablet Take 20 mg by mouth 2 times daily X 5 days (started 11/9/2020)   Yes Unknown, Entered By History   tamsulosin (FLOMAX) 0.4 MG capsule Take 0.4 mg by mouth daily   Yes Unknown, Entered By History       Scheduled Meds    [Held by provider] amLODIPine  10 mg Per Feeding Tube Daily     zz extemporaneous template  50 mg Oral or Feeding " Tube Daily    Followed by     [START ON 12/8/2020] zz extemporaneous template  100 mg Oral or Feeding Tube Daily    Followed by     [START ON 12/22/2020] zz extemporaneous template  150 mg Oral or Feeding Tube Daily    Followed by     [START ON 1/5/2021] zz extemporaneous template  175 mg Oral or Feeding Tube Daily     chlorhexidine  15 mL Mouth/Throat Q12H     heparin ANTICOAGULANT  5,000 Units Subcutaneous Q8H     insulin aspart  1-6 Units Subcutaneous Q4H     nystatin  500,000 Units Mouth/Throat 4x Daily     ondansetron  4 mg Intravenous Once     pantoprazole (PROTONIX) IV  40 mg Intravenous Daily with breakfast     piperacillin-tazobactam  4.5 g Intravenous Q6H     predniSONE  60 mg Oral or Feeding Tube Daily    Followed by     [START ON 12/12/2020] predniSONE  50 mg Oral or Feeding Tube Daily     pyridostigmine  30 mg Oral Q4H     QUEtiapine  75 mg Oral BID     sulfamethoxazole-trimethoprim  10 mL Per Feeding Tube Daily     vancomycin (VANCOCIN) IV  1,500 mg Intravenous Q12H       Infusion Meds    sodium chloride 10 mL/hr at 11/26/20 2000       PRN Meds  acetaminophen **OR** acetaminophen, albuterol, artificial tears ophthalmic solution, glucose **OR** dextrose **OR** glucagon, hydrogen peroxide, labetalol, miconazole, miconazole, midazolam, naloxone **OR** naloxone **OR** naloxone **OR** naloxone, ondansetron **OR** ondansetron, senna-docusate **OR** senna-docusate       PHYSICAL EXAMINATION  Temp:  [97.6  F (36.4  C)-98.8  F (37.1  C)] 98.8  F (37.1  C)  Pulse:  [55-91] 66  Resp:  [12-26] 21  BP: ()/() 96/68  FiO2 (%):  [50 %-100 %] 50 %  SpO2:  [87 %-100 %] 97 %     Date 11/13 11/14 11/15 11/16 11/17 11/18 11/19 11/20 11/21 11/22 11/23 11/24   MIP/NIF -7 -4 -3 -5 -1 -- -- -- -- -- -20 --   Vital Capacity 290 180 240 320 500 -- -- -- -- -- 930 --       General:  patient seated upright in chair without any acute distress    HEENT:  normocephalic/atraumatic, edentulous, trach present  Cardio:   RRR  Pulmonary:  on mechanical ventilation  Abdomen:  soft, non-tender, non-distended  Extremities:  no edema, peripheral pulses palpable  Skin:  intact, warm/dry      Neurologic  Mental Status:  examined on no sedation. Eyes open spontaneously. Follows commands reliably, mouths/gestures/nods/shakes to answer questions, writes with left hand and uses letter board.  Cranial Nerves:  VFF, PERRL, minimal ptosis, conjugate rest gaze, intact horizontal eye movements, face symmetric, weak lip purse  Motor: restless, tapping hands and feet, movements still brisk, elbow flexion 5-/5, elbow extension 4+/5, finger flexion 5/5, finger extension 5/5. Hip flexion 4/5, knee flexion 4+/5, knee extension 4+/5, ankle dorsiflexion 5/5, ankle plantarflexion 5/5. No fasciculations, twitches, or abnormal movements.  Reflexes:  2+ intact and symmetric reflexes in bilateral biceps, brachioradialis, patellae, achilles, toes downgoing  Sensory:  light touch sensation intact and symmetric throughout upper and lower extremities  Coordination:  not tested  Station/Gait:  not tested    Imaging  I personally reviewed all imaging; relevant findings per HPI.     Lab Results Data   CBC  Recent Labs   Lab 11/26/20  0925 11/24/20  1020 11/24/20  0453 11/22/20  0530   WBC 17.4* 18.7*  --  14.5*   RBC 3.67* 3.89*  --  3.55*   HGB 11.1* 11.5* 11.0* 10.7*   HCT 33.6* 35.2*  --  31.9*    242 232 156     Basic Metabolic Panel    Recent Labs   Lab 11/26/20 2015 11/24/20  1020 11/22/20  0530    Canceled, Test credited  135 137   POTASSIUM 3.8 Canceled, Test credited  4.0 3.9   CHLORIDE 109 Canceled, Test credited  102 103   CO2 28 Canceled, Test credited  30 32   BUN 37* Canceled, Test credited  32* 34*   CR 0.72 Canceled, Test credited  0.70 0.68   GLC 98 Canceled, Test credited  174* 124*   JESSICA 8.3* Canceled, Test credited  8.2* 7.4*     Liver Panel  Recent Labs   Lab 11/26/20 2015 11/24/20  1020   PROTTOTAL 6.9 Canceled, Test  credited  7.3   ALBUMIN 2.5* Canceled, Test credited  3.0*   BILITOTAL 0.6 Canceled, Test credited  0.8   ALKPHOS 144 Canceled, Test credited  164*   AST 42 Canceled, Test credited  98*   * Canceled, Test credited  244*     INR    Recent Labs   Lab Test 11/17/20  0400   INR 1.20*      Lipid Profile  No lab results found.  A1C    Recent Labs   Lab Test 11/14/20  0402   A1C 6.0*     Troponin I    No results for input(s): TROPI in the last 168 hours.
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
## 2020-11-27 NOTE — PROGRESS NOTES
Care Management Follow Up    Length of Stay (days): 17    Expected Discharge Date: 11/30/20  <-- recheck LTACH bed availability on this date   Expected Time of Departure: Date/time unknown  Concerns to be Addressed: discharge planning;adjustment to diagnosis/illness     Patient plan of care discussed at interdisciplinary rounds: Yes    Anticipated Discharge Disposition: LTACH  Disposition Comments: Discharge plan remains unknown at this time; Pt resides in a [facility]DC in [place], [place] that family is hopeful he can return to at some point.  Anticipated Discharge Services: Transportation Services  Anticipated Discharge DME: Oxygen    Patient/family educated on Medicare website which has current facility and service quality ratings: yes  Education Provided on the Discharge Plan:  no  Patient/Family in Agreement with the Plan: other (see comments)    Referrals Placed by CM/SW: Transportation([facility] when bed available)  Private pay costs discussed: not at this time    Additional Information:  Pt is on LTACH wait lists; MAYUR-RN called,   + [facility] LTACH referral specialist (Grady [phone]) - spoke, does not expect any availability until next week at the earliest    + [facility] LTACH referral specialist (Rodrigo [phone]) - left VM, callback received; spoke, does not expect any availability until next week at the earliest     Karen Velazquez RN, BSN, PHN  [facility]  Inpatient Care Management  Direct Mobile: [phone]

## 2020-11-27 NOTE — PLAN OF CARE
No acute change today; Much calmer and oriented today. Less secretions noted; Trach site continues to bleed / ooze secretions. Urine output still dark red. 1 small BM in the AM. Daughter updated, had video chat in the afternoon.     Problem: Adult Inpatient Plan of Care  Goal: Plan of Care Review  Outcome: No Change  Goal: Patient-Specific Goal (Individualized)  Outcome: No Change  Goal: Absence of Hospital-Acquired Illness or Injury  Outcome: No Change  Intervention: Identify and Manage Fall Risk  Recent Flowsheet Documentation  Taken 11/26/2020 1600 by Mateo Fritz, RN  Safety Promotion/Fall Prevention:   clutter free environment maintained   fall prevention program maintained   assistive device/personal items within reach   room organization consistent   safety round/check completed   activity supervised  Taken 11/26/2020 1200 by Mateo rFitz, RN  Safety Promotion/Fall Prevention:   clutter free environment maintained   fall prevention program maintained   assistive device/personal items within reach   room organization consistent   safety round/check completed   activity supervised  Taken 11/26/2020 0800 by Mateo Fritz, RN  Safety Promotion/Fall Prevention:   clutter free environment maintained   fall prevention program maintained   assistive device/personal items within reach   room organization consistent   safety round/check completed   activity supervised  Intervention: Prevent Skin Injury  Recent Flowsheet Documentation  Taken 11/26/2020 1800 by Mateo Fritz, RN  Body Position:   turned   weight shifting  Taken 11/26/2020 1600 by Mateo Fritz, RN  Body Position:   turned   weight shifting  Taken 11/26/2020 1400 by Mateo Fritz, RN  Body Position:   turned   weight shifting  Taken 11/26/2020 1200 by Mateo Fritz, RN  Body Position:   turned   weight shifting  Taken 11/26/2020 1000 by Mateo Fritz, RN  Body Position:   turned   weight shifting  Taken 11/26/2020 0800 by Mateo Fritz  ALAINA, RN  Body Position:   turned   weight shifting  Intervention: Prevent and Manage VTE (Venous Thromboembolism) Risk  Recent Flowsheet Documentation  Taken 11/26/2020 1600 by Mateo Fritz RN  VTE Prevention/Management: pneumatic compression device  Taken 11/26/2020 1200 by Mateo Fritz, RN  VTE Prevention/Management: pneumatic compression device  Taken 11/26/2020 0800 by Mateo Fritz RN  VTE Prevention/Management: pneumatic compression device  Intervention: Prevent Infection  Recent Flowsheet Documentation  Taken 11/26/2020 1600 by Mateo Fritz, RN  Infection Prevention:   environmental surveillance performed   hand hygiene promoted   personal protective equipment utilized   rest/sleep promoted   visitors restricted/screened  Taken 11/26/2020 1200 by Mateo Fritz, RN  Infection Prevention:   environmental surveillance performed   hand hygiene promoted   personal protective equipment utilized   rest/sleep promoted   visitors restricted/screened  Taken 11/26/2020 0800 by Mateo Fritz, RN  Infection Prevention:   environmental surveillance performed   hand hygiene promoted   personal protective equipment utilized   rest/sleep promoted   visitors restricted/screened  Goal: Optimal Comfort and Wellbeing  Outcome: No Change  Intervention: Provide Person-Centered Care  Recent Flowsheet Documentation  Taken 11/26/2020 1600 by Mateo Frizt, RN  Trust Relationship/Rapport:   care explained   choices provided   emotional support provided   reassurance provided   thoughts/feelings acknowledged  Taken 11/26/2020 1200 by Mateo Fritz, RN  Trust Relationship/Rapport:   care explained   choices provided   emotional support provided   reassurance provided   thoughts/feelings acknowledged  Taken 11/26/2020 0800 by Mateo Fritz, RN  Trust Relationship/Rapport:   care explained   choices provided   emotional support provided   reassurance provided   thoughts/feelings acknowledged  Goal: Readiness for  Transition of Care  Outcome: No Change

## 2020-11-28 ENCOUNTER — APPOINTMENT (OUTPATIENT)
Dept: GENERAL RADIOLOGY | Facility: CLINIC | Age: 60
DRG: 003 | End: 2020-11-28
Attending: INTERNAL MEDICINE
Payer: COMMERCIAL

## 2020-11-28 LAB
ALBUMIN SERPL-MCNC: 2.2 G/DL (ref 3.4–5)
ALP SERPL-CCNC: 129 U/L (ref 40–150)
ALT SERPL W P-5'-P-CCNC: 120 U/L (ref 0–70)
ANION GAP SERPL CALCULATED.3IONS-SCNC: 5 MMOL/L (ref 3–14)
AST SERPL W P-5'-P-CCNC: 23 U/L (ref 0–45)
BACTERIA SPEC CULT: ABNORMAL
BASOPHILS # BLD AUTO: 0 10E9/L (ref 0–0.2)
BASOPHILS NFR BLD AUTO: 0.1 %
BILIRUB SERPL-MCNC: 0.5 MG/DL (ref 0.2–1.3)
BUN SERPL-MCNC: 32 MG/DL (ref 7–30)
CALCIUM SERPL-MCNC: 7.9 MG/DL (ref 8.5–10.1)
CHLORIDE SERPL-SCNC: 108 MMOL/L (ref 94–109)
CO2 SERPL-SCNC: 26 MMOL/L (ref 20–32)
CREAT SERPL-MCNC: 0.78 MG/DL (ref 0.66–1.25)
DIFFERENTIAL METHOD BLD: ABNORMAL
EOSINOPHIL # BLD AUTO: 0 10E9/L (ref 0–0.7)
EOSINOPHIL NFR BLD AUTO: 0.2 %
ERYTHROCYTE [DISTWIDTH] IN BLOOD BY AUTOMATED COUNT: 14.7 % (ref 10–15)
GFR SERPL CREATININE-BSD FRML MDRD: >90 ML/MIN/{1.73_M2}
GLUCOSE BLDC GLUCOMTR-MCNC: 100 MG/DL (ref 70–99)
GLUCOSE BLDC GLUCOMTR-MCNC: 116 MG/DL (ref 70–99)
GLUCOSE BLDC GLUCOMTR-MCNC: 116 MG/DL (ref 70–99)
GLUCOSE BLDC GLUCOMTR-MCNC: 130 MG/DL (ref 70–99)
GLUCOSE BLDC GLUCOMTR-MCNC: 164 MG/DL (ref 70–99)
GLUCOSE BLDC GLUCOMTR-MCNC: 97 MG/DL (ref 70–99)
GLUCOSE SERPL-MCNC: 94 MG/DL (ref 70–99)
HCT VFR BLD AUTO: 30.4 % (ref 40–53)
HGB BLD-MCNC: 10.1 G/DL (ref 13.3–17.7)
IMM GRANULOCYTES # BLD: 0.1 10E9/L (ref 0–0.4)
IMM GRANULOCYTES NFR BLD: 0.6 %
LYMPHOCYTES # BLD AUTO: 1.3 10E9/L (ref 0.8–5.3)
LYMPHOCYTES NFR BLD AUTO: 10.5 %
MCH RBC QN AUTO: 30.1 PG (ref 26.5–33)
MCHC RBC AUTO-ENTMCNC: 33.2 G/DL (ref 31.5–36.5)
MCV RBC AUTO: 91 FL (ref 78–100)
MONOCYTES # BLD AUTO: 0.6 10E9/L (ref 0–1.3)
MONOCYTES NFR BLD AUTO: 4.6 %
NEUTROPHILS # BLD AUTO: 10.2 10E9/L (ref 1.6–8.3)
NEUTROPHILS NFR BLD AUTO: 84 %
NRBC # BLD AUTO: 0 10*3/UL
NRBC BLD AUTO-RTO: 0 /100
PLATELET # BLD AUTO: 323 10E9/L (ref 150–450)
POTASSIUM SERPL-SCNC: 3.3 MMOL/L (ref 3.4–5.3)
PROT SERPL-MCNC: 6.1 G/DL (ref 6.8–8.8)
RBC # BLD AUTO: 3.36 10E12/L (ref 4.4–5.9)
SODIUM SERPL-SCNC: 139 MMOL/L (ref 133–144)
SPECIMEN SOURCE: ABNORMAL
UPPER GI ENDOSCOPY: NORMAL
WBC # BLD AUTO: 12.2 10E9/L (ref 4–11)

## 2020-11-28 PROCEDURE — 250N000013 HC RX MED GY IP 250 OP 250 PS 637: Performed by: PHYSICIAN ASSISTANT

## 2020-11-28 PROCEDURE — 94640 AIRWAY INHALATION TREATMENT: CPT | Mod: 76

## 2020-11-28 PROCEDURE — 999N000009 HC STATISTIC AIRWAY CARE

## 2020-11-28 PROCEDURE — 94640 AIRWAY INHALATION TREATMENT: CPT

## 2020-11-28 PROCEDURE — 999N000065 XR ABDOMEN PORT 1 VW

## 2020-11-28 PROCEDURE — C9113 INJ PANTOPRAZOLE SODIUM, VIA: HCPCS | Performed by: INTERNAL MEDICINE

## 2020-11-28 PROCEDURE — 80053 COMPREHEN METABOLIC PANEL: CPT | Performed by: INTERNAL MEDICINE

## 2020-11-28 PROCEDURE — 43235 EGD DIAGNOSTIC BRUSH WASH: CPT | Performed by: INTERNAL MEDICINE

## 2020-11-28 PROCEDURE — 99291 CRITICAL CARE FIRST HOUR: CPT | Performed by: INTERNAL MEDICINE

## 2020-11-28 PROCEDURE — 250N000009 HC RX 250: Performed by: STUDENT IN AN ORGANIZED HEALTH CARE EDUCATION/TRAINING PROGRAM

## 2020-11-28 PROCEDURE — 250N000011 HC RX IP 250 OP 636: Performed by: INTERNAL MEDICINE

## 2020-11-28 PROCEDURE — 0DJ08ZZ INSPECTION OF UPPER INTESTINAL TRACT, VIA NATURAL OR ARTIFICIAL OPENING ENDOSCOPIC: ICD-10-PCS | Performed by: INTERNAL MEDICINE

## 2020-11-28 PROCEDURE — 250N000011 HC RX IP 250 OP 636: Performed by: ANESTHESIOLOGY

## 2020-11-28 PROCEDURE — 258N000003 HC RX IP 258 OP 636: Performed by: INTERNAL MEDICINE

## 2020-11-28 PROCEDURE — 999N000157 HC STATISTIC RCP TIME EA 10 MIN

## 2020-11-28 PROCEDURE — 999N001017 HC STATISTIC GLUCOSE BY METER IP

## 2020-11-28 PROCEDURE — 99232 SBSQ HOSP IP/OBS MODERATE 35: CPT | Mod: GC | Performed by: PSYCHIATRY & NEUROLOGY

## 2020-11-28 PROCEDURE — 250N000012 HC RX MED GY IP 250 OP 636 PS 637: Performed by: STUDENT IN AN ORGANIZED HEALTH CARE EDUCATION/TRAINING PROGRAM

## 2020-11-28 PROCEDURE — 94003 VENT MGMT INPAT SUBQ DAY: CPT

## 2020-11-28 PROCEDURE — 200N000001 HC R&B ICU

## 2020-11-28 PROCEDURE — 250N000013 HC RX MED GY IP 250 OP 250 PS 637: Performed by: INTERNAL MEDICINE

## 2020-11-28 PROCEDURE — 85025 COMPLETE CBC W/AUTO DIFF WBC: CPT | Performed by: INTERNAL MEDICINE

## 2020-11-28 PROCEDURE — 250N000011 HC RX IP 250 OP 636

## 2020-11-28 PROCEDURE — 272N000078 HC NUTRITION PRODUCT INTERMEDIATE LITER

## 2020-11-28 PROCEDURE — 250N000013 HC RX MED GY IP 250 OP 250 PS 637: Performed by: STUDENT IN AN ORGANIZED HEALTH CARE EDUCATION/TRAINING PROGRAM

## 2020-11-28 PROCEDURE — 999N000078 HC STATISTIC INTRAPULMONARY PERCUSSIVE VENT

## 2020-11-28 PROCEDURE — 250N000011 HC RX IP 250 OP 636: Performed by: PHYSICIAN ASSISTANT

## 2020-11-28 PROCEDURE — 250N000009 HC RX 250: Performed by: PHYSICIAN ASSISTANT

## 2020-11-28 RX ORDER — LIDOCAINE 40 MG/G
CREAM TOPICAL
Status: CANCELLED | OUTPATIENT
Start: 2020-11-28

## 2020-11-28 RX ORDER — NALOXONE HYDROCHLORIDE 0.4 MG/ML
0.4 INJECTION, SOLUTION INTRAMUSCULAR; INTRAVENOUS; SUBCUTANEOUS
Status: ACTIVE | OUTPATIENT
Start: 2020-11-28 | End: 2020-11-30

## 2020-11-28 RX ORDER — NALOXONE HYDROCHLORIDE 0.4 MG/ML
0.2 INJECTION, SOLUTION INTRAMUSCULAR; INTRAVENOUS; SUBCUTANEOUS
Status: ACTIVE | OUTPATIENT
Start: 2020-11-28 | End: 2020-11-30

## 2020-11-28 RX ORDER — FLUMAZENIL 0.1 MG/ML
0.2 INJECTION, SOLUTION INTRAVENOUS
Status: ACTIVE | OUTPATIENT
Start: 2020-11-28 | End: 2020-11-30

## 2020-11-28 RX ORDER — FENTANYL CITRATE 50 UG/ML
100 INJECTION, SOLUTION INTRAMUSCULAR; INTRAVENOUS ONCE
Status: COMPLETED | OUTPATIENT
Start: 2020-11-28 | End: 2020-11-28

## 2020-11-28 RX ORDER — FENTANYL CITRATE 50 UG/ML
INJECTION, SOLUTION INTRAMUSCULAR; INTRAVENOUS
Status: DISCONTINUED
Start: 2020-11-28 | End: 2020-11-28 | Stop reason: HOSPADM

## 2020-11-28 RX ORDER — POTASSIUM CHLORIDE 20MEQ/15ML
40 LIQUID (ML) ORAL ONCE
Status: COMPLETED | OUTPATIENT
Start: 2020-11-28 | End: 2020-11-28

## 2020-11-28 RX ADMIN — SULFAMETHOXAZOLE AND TRIMETHOPRIM 400 MG: 80; 16 INJECTION, SOLUTION, CONCENTRATE INTRAVENOUS at 13:40

## 2020-11-28 RX ADMIN — POTASSIUM CHLORIDE 40 MEQ: 20 SOLUTION ORAL at 08:47

## 2020-11-28 RX ADMIN — FENTANYL CITRATE 100 MCG: 50 INJECTION, SOLUTION INTRAMUSCULAR; INTRAVENOUS at 13:03

## 2020-11-28 RX ADMIN — MIDAZOLAM HYDROCHLORIDE 5 MG: 1 INJECTION, SOLUTION INTRAMUSCULAR; INTRAVENOUS at 12:54

## 2020-11-28 RX ADMIN — PIPERACILLIN AND TAZOBACTAM 4.5 G: 4; .5 INJECTION, POWDER, FOR SOLUTION INTRAVENOUS at 21:08

## 2020-11-28 RX ADMIN — PIPERACILLIN AND TAZOBACTAM 4.5 G: 4; .5 INJECTION, POWDER, FOR SOLUTION INTRAVENOUS at 17:42

## 2020-11-28 RX ADMIN — SULFAMETHOXAZOLE AND TRIMETHOPRIM 400 MG: 80; 16 INJECTION, SOLUTION, CONCENTRATE INTRAVENOUS at 06:20

## 2020-11-28 RX ADMIN — PIPERACILLIN AND TAZOBACTAM 4.5 G: 4; .5 INJECTION, POWDER, FOR SOLUTION INTRAVENOUS at 03:12

## 2020-11-28 RX ADMIN — SULFAMETHOXAZOLE AND TRIMETHOPRIM 400 MG: 80; 16 INJECTION, SOLUTION, CONCENTRATE INTRAVENOUS at 23:40

## 2020-11-28 RX ADMIN — ALBUTEROL SULFATE 2.5 MG: 2.5 SOLUTION RESPIRATORY (INHALATION) at 00:22

## 2020-11-28 RX ADMIN — ALBUTEROL SULFATE 2.5 MG: 2.5 SOLUTION RESPIRATORY (INHALATION) at 15:02

## 2020-11-28 RX ADMIN — ALBUTEROL SULFATE 2.5 MG: 2.5 SOLUTION RESPIRATORY (INHALATION) at 07:40

## 2020-11-28 RX ADMIN — HEPARIN SODIUM 5000 UNITS: 5000 INJECTION, SOLUTION INTRAVENOUS; SUBCUTANEOUS at 20:39

## 2020-11-28 RX ADMIN — QUETIAPINE 75 MG: 25 TABLET ORAL at 08:50

## 2020-11-28 RX ADMIN — PYRIDOSTIGMINE BROMIDE 30 MG: 60 TABLET ORAL at 20:40

## 2020-11-28 RX ADMIN — PYRIDOSTIGMINE BROMIDE 30 MG: 60 TABLET ORAL at 17:58

## 2020-11-28 RX ADMIN — PYRIDOSTIGMINE BROMIDE 30 MG: 60 TABLET ORAL at 06:20

## 2020-11-28 RX ADMIN — PYRIDOSTIGMINE BROMIDE 30 MG: 60 TABLET ORAL at 01:44

## 2020-11-28 RX ADMIN — MIDAZOLAM HYDROCHLORIDE 1 MG: 1 INJECTION, SOLUTION INTRAMUSCULAR; INTRAVENOUS at 13:13

## 2020-11-28 RX ADMIN — PANTOPRAZOLE SODIUM 40 MG: 40 INJECTION, POWDER, FOR SOLUTION INTRAVENOUS at 20:39

## 2020-11-28 RX ADMIN — PIPERACILLIN AND TAZOBACTAM 4.5 G: 4; .5 INJECTION, POWDER, FOR SOLUTION INTRAVENOUS at 09:03

## 2020-11-28 RX ADMIN — PROCHLORPERAZINE EDISYLATE 5 MG: 5 INJECTION INTRAMUSCULAR; INTRAVENOUS at 03:12

## 2020-11-28 RX ADMIN — ALBUTEROL SULFATE 2.5 MG: 2.5 SOLUTION RESPIRATORY (INHALATION) at 11:12

## 2020-11-28 RX ADMIN — AZATHIOPRINE 50 MG: 50 TABLET ORAL at 08:50

## 2020-11-28 RX ADMIN — PYRIDOSTIGMINE BROMIDE 30 MG: 60 TABLET ORAL at 14:40

## 2020-11-28 RX ADMIN — PANTOPRAZOLE SODIUM 40 MG: 40 INJECTION, POWDER, FOR SOLUTION INTRAVENOUS at 08:50

## 2020-11-28 RX ADMIN — NYSTATIN 500000 UNITS: 100000 SUSPENSION ORAL at 06:18

## 2020-11-28 RX ADMIN — ONDANSETRON 4 MG: 2 INJECTION INTRAMUSCULAR; INTRAVENOUS at 20:40

## 2020-11-28 RX ADMIN — QUETIAPINE 75 MG: 25 TABLET ORAL at 20:39

## 2020-11-28 RX ADMIN — NYSTATIN 500000 UNITS: 100000 SUSPENSION ORAL at 11:55

## 2020-11-28 RX ADMIN — CHLORHEXIDINE GLUCONATE 0.12% ORAL RINSE 15 ML: 1.2 LIQUID ORAL at 08:47

## 2020-11-28 RX ADMIN — PREDNISONE 60 MG: 20 TABLET ORAL at 08:50

## 2020-11-28 RX ADMIN — NYSTATIN 500000 UNITS: 100000 SUSPENSION ORAL at 17:58

## 2020-11-28 RX ADMIN — MIDAZOLAM HYDROCHLORIDE 1 MG: 1 INJECTION, SOLUTION INTRAMUSCULAR; INTRAVENOUS at 13:08

## 2020-11-28 RX ADMIN — MIDAZOLAM HYDROCHLORIDE 1 MG: 1 INJECTION, SOLUTION INTRAMUSCULAR; INTRAVENOUS at 13:10

## 2020-11-28 RX ADMIN — CHLORHEXIDINE GLUCONATE 0.12% ORAL RINSE 15 ML: 1.2 LIQUID ORAL at 20:40

## 2020-11-28 RX ADMIN — ALBUTEROL SULFATE 2.5 MG: 2.5 SOLUTION RESPIRATORY (INHALATION) at 19:30

## 2020-11-28 RX ADMIN — PYRIDOSTIGMINE BROMIDE 30 MG: 60 TABLET ORAL at 10:20

## 2020-11-28 ASSESSMENT — ACTIVITIES OF DAILY LIVING (ADL)
ADLS_ACUITY_SCORE: 23
ADLS_ACUITY_SCORE: 21
ADLS_ACUITY_SCORE: 23
ADLS_ACUITY_SCORE: 21
ADLS_ACUITY_SCORE: 21
ADLS_ACUITY_SCORE: 23

## 2020-11-28 ASSESSMENT — MIFFLIN-ST. JEOR: SCORE: 1549.38

## 2020-11-28 NOTE — PROVIDER NOTIFICATION
Assumed care of pt at 2300. At that time, pt complaining of nausea. PRN zofran given at 2000. MD contacted and order received for PRN compazine. NG hooked up to LIS at this time as well.  MD notified pt with increasing 02 needs, from 40%-->70% between 2000 and 2200. LS coarse with e. wheezes. Order received for meta-neb, given with slight improvement of LS.    0030: MD notified NG with 150 mls medina red blood since 2300. Pt with bowel movement at 0000 that was brown with no melena color or odor. NG clamped and order received for protonix.  Roxanna Delgado RN on 11/28/2020 at 12:49 AM

## 2020-11-28 NOTE — CONSULTS
[facility] Consultation     Roosevelt Burris  [address]  [place] [address]  60 year old male     Admission Date/Time: 11/10/2020  Primary Care Provider: No primary care provider on file.  Referring / Attending Physician:  Rea     We were asked to see the patient in consultation by Dr. Lucia for blood from NGT.    ASSESSMENT:    We were asked perform EGD for this patient.  There has been bright red blood from NG without associated melena, rise in BUN, or significant change in Hgb.  Risks for PUD - mechanical ventilation without gastric prophylaxis, no NSAIDs, +prednisone.    RECOMMENDATIONS:  Agree with starting PPI  Please keep the patient npo, TFs on hold  We will arrange endoscopy as requested, please see this note to follow.    Rodrigo Ochoa,    [facility]  Cell [phone]  ________________________________________________________________________        CC: Left-sided facial droop respiratory failure     HPI:  Roosevelt Burris is a 60 year old male who we are asked to see for possible GI bleed.  Though the patient has not had any melena, apparently had a normal stool yesterday according to the nurse, there was a small amount of red blood suctioned from his NG tube today.  Due to this finding, he was started on Protonix IV this morning.  His risk factors for peptic ulcer disease include prolonged intubation.  I note he has been maintained on high-dose of prednisone, but this has not been in the setting of NSAIDs.    The patient denies abdominal pain.     ROS: A comprehensive ten point review of systems was negative aside from those in mentioned in the HPI.      PAST MEDICAL HISTORY:  Patient Active Problem List    Diagnosis Date Noted     Delirium 11/22/2020     Priority: Medium     Myasthenia gravis with exacerbation (H) 11/22/2020     Priority: Medium     Thrush 11/22/2020     Priority: Medium     Acute hypoxemic respiratory failure (H) 11/10/2020     Priority:  Medium     SOCIAL HISTORY:  Social History     Tobacco Use     Smoking status: Not on file   Substance Use Topics     Alcohol use: Not on file     Drug use: Not on file     FAMILY HISTORY:  No family history on file.  ALLERGIES:   Allergies   Allergen Reactions     Dexmedetomidine      Bradycardia even at very low dose       MEDICATIONS:   Current Facility-Administered Medications   Medication     acetaminophen (TYLENOL) tablet 650 mg    Or     acetaminophen (TYLENOL) solution 650 mg     albuterol (PROVENTIL) neb solution 2.5 mg     [Held by provider] amLODIPine (NORVASC) tablet 10 mg     azaTHIOprine (IMURAN) 50 mg in sterile water (preservative free) 10 mL    Followed by     [START ON 12/8/2020] azaTHIOprine (IMURAN) 100 mg in sterile water (preservative free) 10 mL    Followed by     [START ON 12/22/2020] azaTHIOprine (IMURAN) 150 mg in sterile water (preservative free) 10 mL    Followed by     [START ON 1/5/2021] azaTHIOprine (IMURAN) 25 mg, azaTHIOprine (IMURAN) 150 mg     carboxymethylcellulose PF (REFRESH PLUS) 0.5 % ophthalmic solution 1 drop     chlorhexidine (PERIDEX) 0.12 % solution 15 mL     glucose gel 15-30 g    Or     dextrose 50 % injection 25-50 mL    Or     glucagon injection 1 mg     heparin ANTICOAGULANT injection 5,000 Units     hydrogen peroxide 3 % solution     insulin aspart (NovoLOG) injection (RAPID ACTING)     labetalol (NORMODYNE/TRANDATE) injection 10-20 mg     miconazole (MICATIN) 2 % cream     miconazole (MICATIN) 2 % powder     midazolam (VERSED) injection 1 mg     naloxone (NARCAN) injection 0.2 mg    Or     naloxone (NARCAN) injection 0.4 mg    Or     naloxone (NARCAN) injection 0.2 mg    Or     naloxone (NARCAN) injection 0.4 mg     nystatin (MYCOSTATIN) suspension 500,000 Units     ondansetron (ZOFRAN-ODT) ODT tab 8 mg    Or     ondansetron (ZOFRAN) injection 4 mg     pantoprazole (PROTONIX) IV push injection 40 mg     piperacillin-tazobactam (ZOSYN) 4.5 g vial to attach to   "mL bag     predniSONE (DELTASONE) tablet 60 mg    Followed by     [START ON 12/12/2020] predniSONE (DELTASONE) tablet 50 mg     prochlorperazine (COMPAZINE) injection 5 mg     pyridostigmine (MESTINON) half-tab 30 mg     QUEtiapine (SEROquel) tablet 75 mg     senna-docusate (SENOKOT-S/PERICOLACE) 8.6-50 MG per tablet 1 tablet    Or     senna-docusate (SENOKOT-S/PERICOLACE) 8.6-50 MG per tablet 2 tablet     sodium chloride 0.9% infusion     sulfamethoxazole-trimethoprim (BACTRIM) 400 mg in D5W intermittent infusion     PHYSICAL EXAM:   /74 (BP Location: Right leg)   Pulse 72   Temp 98.1  F (36.7  C) (Oral)   Resp 16   Ht 1.753 m (5' 9\")   Wt 74.4 kg (164 lb 0.4 oz)   SpO2 96%   BMI 24.22 kg/m     GEN: Alert, oriented x3, communicative and in NAD.  KRISTEL: AT, anicteric, OP without erythema, exudate, or ulcers.    NECK: Supple.    ABD: ND, +BS, no guarding or pain to palpation, no rebound, no HSM.       ADDITIONAL DATA:   I reviewed the patient's new clinical lab test results.   Recent Labs   Lab Test 11/28/20 0500 11/27/20 2000 11/26/20  0925 11/17/20  0400 11/17/20  0400   WBC 12.2* 10.9 17.4*   < >  --    HGB 10.1* 11.1* 11.1*   < >  --    MCV 91 91 92   < >  --     337 304   < >  --    INR  --  1.08  --   --  1.20*    < > = values in this interval not displayed.     Recent Labs   Lab Test 11/28/20 0500 11/27/20 2000 11/26/20 2015   POTASSIUM 3.3* 3.4 3.8   CHLORIDE 108 109 109   CO2 26 25 28   BUN 32* 33* 37*   ANIONGAP 5 8 5     Recent Labs   Lab Test 11/28/20  0500 11/27/20 2000 11/26/20 2015 11/11/20 0100 11/11/20 0100   ALBUMIN 2.2* 2.5* 2.5*   < >  --    BILITOTAL 0.5 0.4 0.6   < >  --    * 152* 183*   < >  --    AST 23 27 42   < >  --    PROTEIN  --   --   --   --  30*    < > = values in this interval not displayed.        I reviewed the patient's new imaging results.               "

## 2020-11-28 NOTE — PLAN OF CARE
EGD performed at bedside with gastroenterologist present. Pt remained vitally stable during and after procedure.
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
## 2020-11-28 NOTE — PLAN OF CARE
No changes. Remains SR. On full vent support. Desats at 40%. Needs post pyloric TF placed with IR tomorrow. Several loose stools. Good UO. Alert and orientated.

## 2020-11-28 NOTE — OR NURSING
EGD in ICU.  ICU nurse monitored patient and sedation.  No interventions done or specimens obtained.

## 2020-11-28 NOTE — PROGRESS NOTES
[facility]    Stroke/Neurocritical Care Progress Note    Interval Events  Neuroexam remains stable.  Patient had an episode of aspiration on 11/25 resulting in intermittent hypoxic episodes, for which he had a bronchoscopy completed 11/25 with moderate thick tan secretions, consistent with aspiration of tube feeds.  Following bronchial lavage, the patient has been able to tolerate FiO2 decrements, albeit, with intermittent desatting episodes, especially upon turning to the left, with another episode overnight on 11/27 at 8PM and 10PM with improvement with Metanebs. Overnight NG suctioned for 150 mls medina red blood, started on protonic    Impression  1. 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 Anti CRMP-5 negative with remainder paraneoplastic panel pending.  Encouraging response to PLEX x5 (11/12-19), methylpred 500 mg IV q12 x10 (11/12-16), and IVIg x4 (11/19-22) with regard to best bulbar and limb exams over the past few days, but delayed respiratory status response necessitated trach. Hopefully will recover quickly enough that he does not need a PEG and can wean off of NG in the next few weeks.  -C/w pyridostigmine [11/25], frequency adjusted to 30mg mg 6times daily    2. Respiratory paralysis- secondary to #1, following daily inspiratory forces/tidal volumes as tolerated in response to therapy  - NIF on 11/23 was -20; on 11/27 was -16  - C/w Daily NIFs and VC (informed RT and a second order  "placed)    3. Lung consolidation- s/p 7 day course of unasyn.     4.  Aspiration Pneumonia - bronchoscopy completed 11/25 with moderate thick tan secretions, consistent with aspiration of tube feeds.  - Remains afebrile, with improving leukocytosis (~11-12 from ~17-18). However, high white count is likely a response to systemic steroids.   - Vanc/Zosyn started 11/26 for Aspiration PNA   - Remains on Bactrim for PCP prophylaxis     5. Upper GI bleeding  - Overnight on 11/28 NG suctioned 150 mls medina red blood  - C/w Protonix and f/u GI reccs      Plan  [] MG:   - C/w Prednisone: 60 mg every day -  down-titration at one month from initiation  - C/w Azathioprine 50 mg daily, increase by 50 mg every 2 weeks until goal (175 mg daily)  - C/w pyridostigmine 30mg mg 6times daily  - Follow up with Dr. Greenwood of [facility] post-hospital for ongoing management (he may have a [place] or [place] outreach clinic available for this patient from [place])    [] Remainder of cares for aspiration pneumonia and GI bleeding per primary team     The Stroke/Neurocritical Care Staff is Dr. Montalvo.     Miguel Hernandez MD  Vascular Neurology Fellow  To page me or covering stroke neurology team member, click here: AMCOM   Choose \"On Call\" tab at top, then search dropdown box for \"Neurology Adult\", select location, press Enter, then look for stroke/neuro ICU/telestroke.    ______________________________________________________    Medications   Home Meds  Prior to Admission medications    Medication Sig Start Date End Date Taking? Authorizing Provider   amoxicillin-clavulanate (AUGMENTIN) 875-125 MG tablet Take 1 tablet by mouth 2 times daily X 10 days (started 11/9/2020)   Yes Unknown, Entered By History   HYDROcodone-acetaminophen (NORCO) 5-325 MG tablet Take 1-2 tablets by mouth 2 times daily as needed for severe pain    Yes Unknown, Entered By History   predniSONE (DELTASONE) 20 MG tablet Take 20 mg by mouth 2 times daily X 5 days (started " 11/9/2020)   Yes Unknown, Entered By History   tamsulosin (FLOMAX) 0.4 MG capsule Take 0.4 mg by mouth daily   Yes Unknown, Entered By History       Scheduled Meds    [Held by provider] amLODIPine  10 mg Per Feeding Tube Daily     zz extemporaneous template  50 mg Oral or Feeding Tube Daily    Followed by     [START ON 12/8/2020] zz extemporaneous template  100 mg Oral or Feeding Tube Daily    Followed by     [START ON 12/22/2020] zz extemporaneous template  150 mg Oral or Feeding Tube Daily    Followed by     [START ON 1/5/2021] zz extemporaneous template  175 mg Oral or Feeding Tube Daily     chlorhexidine  15 mL Mouth/Throat Q12H     heparin ANTICOAGULANT  5,000 Units Subcutaneous Q8H     insulin aspart  1-6 Units Subcutaneous Q4H     nystatin  500,000 Units Mouth/Throat 4x Daily     pantoprazole (PROTONIX) IV  40 mg Intravenous BID     piperacillin-tazobactam  4.5 g Intravenous Q6H     predniSONE  60 mg Oral or Feeding Tube Daily    Followed by     [START ON 12/12/2020] predniSONE  50 mg Oral or Feeding Tube Daily     pyridostigmine  30 mg Oral Q4H     QUEtiapine  75 mg Oral BID     sulfamethoxazole-trimethoprim (BACTRIM/SEPTRA) intermittent infusion  400 mg Intravenous Q8H       Infusion Meds    sodium chloride 20 mL/hr at 11/27/20 2000       PRN Meds  acetaminophen **OR** acetaminophen, albuterol, artificial tears ophthalmic solution, glucose **OR** dextrose **OR** glucagon, hydrogen peroxide, labetalol, miconazole, miconazole, midazolam, naloxone **OR** naloxone **OR** naloxone **OR** naloxone, ondansetron **OR** ondansetron, prochlorperazine, senna-docusate **OR** senna-docusate       PHYSICAL EXAMINATION  Temp:  [97.5  F (36.4  C)-98.5  F (36.9  C)] 98.1  F (36.7  C)  Pulse:  [51-93] 64  Resp:  [14-33] 22  BP: ()/() 112/72  FiO2 (%):  [40 %-70 %] 50 %  SpO2:  [87 %-100 %] 98 %     Date 11/13 11/14 11/15 11/16 11/17 11/18 11/19 11/20 11/21 11/22 11/23 11/24 11/25 11/26 11/27 11/28 11/29   MIP/NIF  -7 -4 -3 -5 -1 -- -- -- -- -- -20 -- -- -- -16 --    Vital Capacity 290 180 240 320 500 -- -- -- -- -- 930 -- -- -- -- --        General:  patient seated upright in chair without any acute distress    HEENT:  normocephalic/atraumatic, edentulous, trach present  Cardio:  RRR  Pulmonary:  on mechanical ventilation  Abdomen:  soft, non-tender, non-distended  Extremities:  no edema, peripheral pulses palpable  Skin:  intact, warm/dry      Neurologic  Mental Status:  examined on no sedation. Eyes open spontaneously. Follows commands reliably, mouths/gestures/nods/shakes to answer questions, writes with left hand and uses letter board.  Cranial Nerves:  VFF, PERRL, minimal ptosis, conjugate rest gaze, intact horizontal eye movements, face symmetric, weak lip purse  Motor: restless, tapping hands and feet, movements still brisk, elbow flexion 5-/5, elbow extension 4+/5, finger flexion 5/5, finger extension 5/5. Hip flexion 4/5, knee flexion 4+/5, knee extension 4+/5, ankle dorsiflexion 5/5, ankle plantarflexion 5/5. No fasciculations, twitches, or abnormal movements.  Reflexes:  2+ intact and symmetric reflexes in bilateral biceps, brachioradialis, patellae, achilles, toes downgoing  Sensory:  light touch sensation intact and symmetric throughout upper and lower extremities  Coordination:  not tested  Station/Gait:  not tested    Imaging  I personally reviewed all imaging; relevant findings per HPI.     Lab Results Data   CBC  Recent Labs   Lab 11/28/20  0500 11/27/20 2000 11/26/20  0925   WBC 12.2* 10.9 17.4*   RBC 3.36* 3.68* 3.67*   HGB 10.1* 11.1* 11.1*   HCT 30.4* 33.3* 33.6*    337 304     Basic Metabolic Panel    Recent Labs   Lab 11/28/20  0500 11/27/20 2000 11/26/20  2015    142 142   POTASSIUM 3.3* 3.4 3.8   CHLORIDE 108 109 109   CO2 26 25 28   BUN 32* 33* 37*   CR 0.78 0.68 0.72   GLC 94 93 98   JESSICA 7.9* 8.2* 8.3*     Liver Panel  Recent Labs   Lab 11/28/20  0500 11/27/20 2000 11/26/20 2015    PROTTOTAL 6.1* 6.9 6.9   ALBUMIN 2.2* 2.5* 2.5*   BILITOTAL 0.5 0.4 0.6   ALKPHOS 129 152* 144   AST 23 27 42   * 152* 183*     INR    Recent Labs   Lab Test 11/27/20 2000 11/17/20  0400   INR 1.08 1.20*      Lipid Profile  No lab results found.  A1C    Recent Labs   Lab Test 11/14/20  0402   A1C 6.0*     Troponin I    No results for input(s): TROPI in the last 168 hours.

## 2020-11-28 NOTE — PLAN OF CARE
Shift [id number]: see previous note for acute changes    Neuro: A/O x4. Follows commands. Strength 5/5 bilat  Pulm: Vent to trach with intermittent O2 desat to low 80s  requiring increased O2 needs. Currently  Trach site with small thick yellow mucous and sanguinous output. LS coarse/e. wheezes at times but improved overnight.   CV: SR/SB 50-90s. BP WNL w/o intervention  GI/: [place] with dark blake/red output, adequate output. NG to LIS with 150ml medina blood OP, see previous note. BMx1, brown with no melena color or odor.  Family: no calls or visitors this shift    Roxanna Delgado RN on 11/28/2020 at 5:28 AM

## 2020-11-29 ENCOUNTER — APPOINTMENT (OUTPATIENT)
Dept: GENERAL RADIOLOGY | Facility: CLINIC | Age: 60
DRG: 003 | End: 2020-11-29
Attending: INTERNAL MEDICINE
Payer: COMMERCIAL

## 2020-11-29 LAB
ALBUMIN SERPL-MCNC: 2.2 G/DL (ref 3.4–5)
ALP SERPL-CCNC: 123 U/L (ref 40–150)
ALT SERPL W P-5'-P-CCNC: 106 U/L (ref 0–70)
ANION GAP SERPL CALCULATED.3IONS-SCNC: 5 MMOL/L (ref 3–14)
AST SERPL W P-5'-P-CCNC: 23 U/L (ref 0–45)
BASOPHILS # BLD AUTO: 0 10E9/L (ref 0–0.2)
BASOPHILS NFR BLD AUTO: 0.1 %
BILIRUB SERPL-MCNC: 0.2 MG/DL (ref 0.2–1.3)
BUN SERPL-MCNC: 21 MG/DL (ref 7–30)
CALCIUM SERPL-MCNC: 7.9 MG/DL (ref 8.5–10.1)
CHLORIDE SERPL-SCNC: 106 MMOL/L (ref 94–109)
CO2 SERPL-SCNC: 24 MMOL/L (ref 20–32)
CREAT SERPL-MCNC: 0.74 MG/DL (ref 0.66–1.25)
DIFFERENTIAL METHOD BLD: ABNORMAL
EOSINOPHIL # BLD AUTO: 0 10E9/L (ref 0–0.7)
EOSINOPHIL NFR BLD AUTO: 0.4 %
ERYTHROCYTE [DISTWIDTH] IN BLOOD BY AUTOMATED COUNT: 14.4 % (ref 10–15)
GFR SERPL CREATININE-BSD FRML MDRD: >90 ML/MIN/{1.73_M2}
GLUCOSE BLDC GLUCOMTR-MCNC: 112 MG/DL (ref 70–99)
GLUCOSE BLDC GLUCOMTR-MCNC: 144 MG/DL (ref 70–99)
GLUCOSE BLDC GLUCOMTR-MCNC: 176 MG/DL (ref 70–99)
GLUCOSE BLDC GLUCOMTR-MCNC: 83 MG/DL (ref 70–99)
GLUCOSE BLDC GLUCOMTR-MCNC: 88 MG/DL (ref 70–99)
GLUCOSE SERPL-MCNC: 117 MG/DL (ref 70–99)
HCT VFR BLD AUTO: 29.9 % (ref 40–53)
HGB BLD-MCNC: 10 G/DL (ref 13.3–17.7)
IMM GRANULOCYTES # BLD: 0.1 10E9/L (ref 0–0.4)
IMM GRANULOCYTES NFR BLD: 1.1 %
LYMPHOCYTES # BLD AUTO: 1.7 10E9/L (ref 0.8–5.3)
LYMPHOCYTES NFR BLD AUTO: 17.5 %
MCH RBC QN AUTO: 29.9 PG (ref 26.5–33)
MCHC RBC AUTO-ENTMCNC: 33.4 G/DL (ref 31.5–36.5)
MCV RBC AUTO: 90 FL (ref 78–100)
MONOCYTES # BLD AUTO: 0.5 10E9/L (ref 0–1.3)
MONOCYTES NFR BLD AUTO: 4.8 %
NEUTROPHILS # BLD AUTO: 7.3 10E9/L (ref 1.6–8.3)
NEUTROPHILS NFR BLD AUTO: 76.1 %
NRBC # BLD AUTO: 0 10*3/UL
NRBC BLD AUTO-RTO: 0 /100
PLATELET # BLD AUTO: 305 10E9/L (ref 150–450)
POTASSIUM SERPL-SCNC: 3.6 MMOL/L (ref 3.4–5.3)
PROT SERPL-MCNC: 6 G/DL (ref 6.8–8.8)
RBC # BLD AUTO: 3.34 10E12/L (ref 4.4–5.9)
SODIUM SERPL-SCNC: 135 MMOL/L (ref 133–144)
WBC # BLD AUTO: 9.6 10E9/L (ref 4–11)

## 2020-11-29 PROCEDURE — 94640 AIRWAY INHALATION TREATMENT: CPT | Mod: 76

## 2020-11-29 PROCEDURE — 250N000012 HC RX MED GY IP 250 OP 636 PS 637: Performed by: STUDENT IN AN ORGANIZED HEALTH CARE EDUCATION/TRAINING PROGRAM

## 2020-11-29 PROCEDURE — 94640 AIRWAY INHALATION TREATMENT: CPT

## 2020-11-29 PROCEDURE — 250N000011 HC RX IP 250 OP 636: Performed by: PHYSICIAN ASSISTANT

## 2020-11-29 PROCEDURE — 250N000011 HC RX IP 250 OP 636

## 2020-11-29 PROCEDURE — 250N000009 HC RX 250: Performed by: PHYSICIAN ASSISTANT

## 2020-11-29 PROCEDURE — 999N001017 HC STATISTIC GLUCOSE BY METER IP

## 2020-11-29 PROCEDURE — 94150 VITAL CAPACITY TEST: CPT

## 2020-11-29 PROCEDURE — 250N000013 HC RX MED GY IP 250 OP 250 PS 637: Performed by: PHYSICIAN ASSISTANT

## 2020-11-29 PROCEDURE — 258N000003 HC RX IP 258 OP 636: Performed by: INTERNAL MEDICINE

## 2020-11-29 PROCEDURE — 999N000078 HC STATISTIC INTRAPULMONARY PERCUSSIVE VENT

## 2020-11-29 PROCEDURE — 999N000009 HC STATISTIC AIRWAY CARE

## 2020-11-29 PROCEDURE — 99232 SBSQ HOSP IP/OBS MODERATE 35: CPT | Mod: GC | Performed by: PSYCHIATRY & NEUROLOGY

## 2020-11-29 PROCEDURE — 250N000013 HC RX MED GY IP 250 OP 250 PS 637: Performed by: STUDENT IN AN ORGANIZED HEALTH CARE EDUCATION/TRAINING PROGRAM

## 2020-11-29 PROCEDURE — 200N000001 HC R&B ICU

## 2020-11-29 PROCEDURE — 71045 X-RAY EXAM CHEST 1 VIEW: CPT

## 2020-11-29 PROCEDURE — 272N000078 HC NUTRITION PRODUCT INTERMEDIATE LITER

## 2020-11-29 PROCEDURE — 80053 COMPREHEN METABOLIC PANEL: CPT | Performed by: INTERNAL MEDICINE

## 2020-11-29 PROCEDURE — 999N000157 HC STATISTIC RCP TIME EA 10 MIN

## 2020-11-29 PROCEDURE — 250N000011 HC RX IP 250 OP 636: Performed by: INTERNAL MEDICINE

## 2020-11-29 PROCEDURE — 250N000013 HC RX MED GY IP 250 OP 250 PS 637: Performed by: INTERNAL MEDICINE

## 2020-11-29 PROCEDURE — 99291 CRITICAL CARE FIRST HOUR: CPT | Performed by: INTERNAL MEDICINE

## 2020-11-29 PROCEDURE — 85025 COMPLETE CBC W/AUTO DIFF WBC: CPT | Performed by: INTERNAL MEDICINE

## 2020-11-29 PROCEDURE — 94003 VENT MGMT INPAT SUBQ DAY: CPT

## 2020-11-29 PROCEDURE — C9113 INJ PANTOPRAZOLE SODIUM, VIA: HCPCS | Performed by: INTERNAL MEDICINE

## 2020-11-29 PROCEDURE — 250N000009 HC RX 250: Performed by: STUDENT IN AN ORGANIZED HEALTH CARE EDUCATION/TRAINING PROGRAM

## 2020-11-29 RX ORDER — QUETIAPINE FUMARATE 25 MG/1
75 TABLET, FILM COATED ORAL 2 TIMES DAILY
Status: DISCONTINUED | OUTPATIENT
Start: 2020-11-29 | End: 2020-11-30

## 2020-11-29 RX ORDER — LANOLIN ALCOHOL/MO/W.PET/CERES
3 CREAM (GRAM) TOPICAL AT BEDTIME
Status: DISCONTINUED | OUTPATIENT
Start: 2020-11-29 | End: 2020-11-30

## 2020-11-29 RX ORDER — SULFAMETHOXAZOLE AND TRIMETHOPRIM 200; 40 MG/5ML; MG/5ML
50 SUSPENSION ORAL EVERY 8 HOURS
Status: DISCONTINUED | OUTPATIENT
Start: 2020-11-29 | End: 2020-12-04

## 2020-11-29 RX ADMIN — PYRIDOSTIGMINE BROMIDE 30 MG: 60 TABLET ORAL at 16:15

## 2020-11-29 RX ADMIN — NYSTATIN 500000 UNITS: 100000 SUSPENSION ORAL at 05:41

## 2020-11-29 RX ADMIN — PIPERACILLIN AND TAZOBACTAM 4.5 G: 4; .5 INJECTION, POWDER, FOR SOLUTION INTRAVENOUS at 04:40

## 2020-11-29 RX ADMIN — PYRIDOSTIGMINE BROMIDE 30 MG: 60 TABLET ORAL at 11:59

## 2020-11-29 RX ADMIN — Medication 3 MG: at 19:31

## 2020-11-29 RX ADMIN — SULFAMETHOXAZOLE AND TRIMETHOPRIM 400 MG: 200; 40 SUSPENSION ORAL at 21:06

## 2020-11-29 RX ADMIN — ALBUTEROL SULFATE 2.5 MG: 2.5 SOLUTION RESPIRATORY (INHALATION) at 11:27

## 2020-11-29 RX ADMIN — PREDNISONE 60 MG: 20 TABLET ORAL at 07:41

## 2020-11-29 RX ADMIN — HEPARIN SODIUM 5000 UNITS: 5000 INJECTION, SOLUTION INTRAVENOUS; SUBCUTANEOUS at 11:59

## 2020-11-29 RX ADMIN — PROCHLORPERAZINE EDISYLATE 5 MG: 5 INJECTION INTRAMUSCULAR; INTRAVENOUS at 16:16

## 2020-11-29 RX ADMIN — ACETAMINOPHEN 650 MG: 325 TABLET, FILM COATED ORAL at 16:15

## 2020-11-29 RX ADMIN — PANTOPRAZOLE SODIUM 40 MG: 40 INJECTION, POWDER, FOR SOLUTION INTRAVENOUS at 19:32

## 2020-11-29 RX ADMIN — SULFAMETHOXAZOLE AND TRIMETHOPRIM 400 MG: 80; 16 INJECTION, SOLUTION, CONCENTRATE INTRAVENOUS at 14:11

## 2020-11-29 RX ADMIN — PIPERACILLIN AND TAZOBACTAM 4.5 G: 4; .5 INJECTION, POWDER, FOR SOLUTION INTRAVENOUS at 16:15

## 2020-11-29 RX ADMIN — PYRIDOSTIGMINE BROMIDE 30 MG: 60 TABLET ORAL at 07:39

## 2020-11-29 RX ADMIN — AZATHIOPRINE 50 MG: 50 TABLET ORAL at 10:15

## 2020-11-29 RX ADMIN — HEPARIN SODIUM 5000 UNITS: 5000 INJECTION, SOLUTION INTRAVENOUS; SUBCUTANEOUS at 19:30

## 2020-11-29 RX ADMIN — PANTOPRAZOLE SODIUM 40 MG: 40 INJECTION, POWDER, FOR SOLUTION INTRAVENOUS at 07:41

## 2020-11-29 RX ADMIN — ACETAMINOPHEN 650 MG: 325 TABLET, FILM COATED ORAL at 19:31

## 2020-11-29 RX ADMIN — PYRIDOSTIGMINE BROMIDE 30 MG: 60 TABLET ORAL at 04:40

## 2020-11-29 RX ADMIN — PYRIDOSTIGMINE BROMIDE 30 MG: 60 TABLET ORAL at 19:32

## 2020-11-29 RX ADMIN — PIPERACILLIN AND TAZOBACTAM 4.5 G: 4; .5 INJECTION, POWDER, FOR SOLUTION INTRAVENOUS at 09:26

## 2020-11-29 RX ADMIN — CHLORHEXIDINE GLUCONATE 0.12% ORAL RINSE 15 ML: 1.2 LIQUID ORAL at 07:38

## 2020-11-29 RX ADMIN — QUETIAPINE 75 MG: 25 TABLET ORAL at 19:31

## 2020-11-29 RX ADMIN — HEPARIN SODIUM 5000 UNITS: 5000 INJECTION, SOLUTION INTRAVENOUS; SUBCUTANEOUS at 04:40

## 2020-11-29 RX ADMIN — CHLORHEXIDINE GLUCONATE 0.12% ORAL RINSE 15 ML: 1.2 LIQUID ORAL at 19:31

## 2020-11-29 RX ADMIN — PROCHLORPERAZINE EDISYLATE 5 MG: 5 INJECTION INTRAMUSCULAR; INTRAVENOUS at 07:39

## 2020-11-29 RX ADMIN — QUETIAPINE 75 MG: 25 TABLET ORAL at 07:38

## 2020-11-29 RX ADMIN — NYSTATIN 500000 UNITS: 100000 SUSPENSION ORAL at 11:59

## 2020-11-29 RX ADMIN — PIPERACILLIN AND TAZOBACTAM 4.5 G: 4; .5 INJECTION, POWDER, FOR SOLUTION INTRAVENOUS at 21:06

## 2020-11-29 RX ADMIN — NYSTATIN 500000 UNITS: 100000 SUSPENSION ORAL at 23:49

## 2020-11-29 RX ADMIN — ALBUTEROL SULFATE 2.5 MG: 2.5 SOLUTION RESPIRATORY (INHALATION) at 07:11

## 2020-11-29 RX ADMIN — ACETAMINOPHEN 650 MG: 325 TABLET, FILM COATED ORAL at 07:39

## 2020-11-29 RX ADMIN — NYSTATIN 500000 UNITS: 100000 SUSPENSION ORAL at 19:31

## 2020-11-29 RX ADMIN — SULFAMETHOXAZOLE AND TRIMETHOPRIM 400 MG: 80; 16 INJECTION, SOLUTION, CONCENTRATE INTRAVENOUS at 07:20

## 2020-11-29 ASSESSMENT — ACTIVITIES OF DAILY LIVING (ADL)
ADLS_ACUITY_SCORE: 25
ADLS_ACUITY_SCORE: 24
ADLS_ACUITY_SCORE: 23
ADLS_ACUITY_SCORE: 23
ADLS_ACUITY_SCORE: 24
ADLS_ACUITY_SCORE: 23

## 2020-11-29 NOTE — PROGRESS NOTES
[facility] ICU RESPIRATORY NOTE        Date of Admission: 11/10/2020    Date of Intubation (most recent):11/19/2020    Reason for Mechanical Ventilation:Respiratory failure    Number of Days on Mechanical Ventilation:20    Met Criteria for Spontaneous Breathing Trial:Yes. Pt was placed on PS 8/10 at 1115. Settings were decreased to 5/5 at 1138. Pt is still on PS at this time and is tolerating the trial well.    Significant Events Today:PEEP was decreased to 5 per MD order. Metaneb treatments done per MD order.    ABG Results:   Recent Labs   Lab 11/23/20  1200   PH 7.49*   PCO2 43   PO2 60*   HCO3 33*   O2PER 40       Current Vent Settings: Ventilation Mode: CPAP/PS  (Continuous positive airway pressure with Pressure Support)  FiO2 (%): 30 %  Rate Set (breaths/minute): 14 breaths/min  Tidal Volume Set (mL): 500 mL  PEEP (cm H2O): 5 cmH2O  Pressure Support (cm H2O): 5 cmH2O  Oxygen Concentration (%): 30 %  Resp: 27      Skin Assessment:Moderate amount of drainage around trach site.    Plan:Will cont PS as tolerated. Plan for pt to go back on CMV settings later this evening to rest overnight.    Bhavana Landon, RT

## 2020-11-29 NOTE — PROGRESS NOTES
[facility]    Stroke/Neurocritical Care Progress Note    Interval Events  Neuroexam remains stable.  Overnight had one episode of desaturation while asleep, asymptomatic and was able to tolerate a wean down to 40% FiO2 with a PEEP of 10.  He was also evaluated by gastroenterology yesterday, status post a EGD that showed erosive esophagitis and nonbleeding gastropathy and duodenal ulcers.  Multiple bowel movements overnight, which could be a result of pyridostigmine.  However, in view of upper GI bleeding, will check a stool guaiac test.  He remains on systemic steroids for management of myasthenic crisis.  H&H remained stable at around 10/30, however lower when compared to admission H&H of 13/42.    Impression  1. 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 Anti CRMP-5 negative with remainder paraneoplastic antibodies sent on 11/11 returned negative.  Encouraging response to PLEX x5 (11/12-19), methylpred 500 mg IV q12 x10 (11/12-16), and IVIg x4 (11/19-22) with regard to best bulbar and limb exams over the past few days, but delayed respiratory status response necessitated trach. Hopefully will recover quickly enough that he does not need a PEG and can wean off of NG in the next few weeks.    - C/w Prednisone: 60 mg every day -  down-titration at one month from initiation  - C/w Azathioprine 50 mg daily, increase by 50 mg every 2 weeks until goal (175 mg daily)  - C/w pyridostigmine 30mg mg 6times  daily  - NIF on 11/23 was -20; on 11/27 was -16  - C/w Daily NIFs and VC (informed RT and a second order placed)  - Follow up with Dr. Greenwood of [facility] post-hospital for ongoing management (he may have a [place] or [place] outreach clinic available for this patient from [place])    2. Respiratory paralysis- secondary to #1, following daily inspiratory forces/tidal volumes as tolerated in response to therapy    3. Lung consolidation- s/p 7 day course of unasyn.     4.  Aspiration Pneumonia - bronchoscopy completed 11/25 with moderate thick tan secretions, consistent with aspiration of tube feeds.  - Remains afebrile, with now resolved leukocytosis (~10 from ~17-18 on 11/24-11/25).   - Vanc/Zosyn started 11/26 for Aspiration PNA -with subsequent fungal cultures growing pansensitive Enterobacter cloacae/stenotrophomonas m. with antibiotic de-escalation to Zosyn  - Remains on Bactrim for PCP prophylaxis     5. Upper GI bleeding  - Overnight on 11/28 NG suctioned 150 mls medina red blood  - Status post EGD 11/28 that showed erosive esophagitis and nonbleeding gastropathy and duodenal ulcers  - Multiple bowel movements overnight, which could be a result of pyridostigmine.  However, in view of upper GI bleeding, will check a stool guaiac test.   - He remains on systemic steroids for management of myasthenic crisis.  H&H remained stable at around 10/30, however lower when compared to admission H&H of 13/42.  - C/w Protonix 40 IV bid  - Given the need for systemic steroids for treatment of myasthenic crisis, the patient will need close monitoring for GI bleeding given continued risk of steroid-induced gastropathy.    6. Insomnia  - Multifactorial: Systemic steroid use, prolonged ICU stay  - Discontinued high-dose scheduled Seroquel, would start with 3 mg melatonin at 7 PM nightly [ordered].  As needed Seroquel may be used intermittently  - Okay to restrict nighttime fingerstick glucose checks and neuro checks to every 6  "hours to allow for adequate sleep.    The Stroke/Neurocritical Care Staff is Dr. Montalvo.     Miguel Hernandez MD  Vascular Neurology Fellow  To page me or covering stroke neurology team member, click here: AMCOM   Choose \"On Call\" tab at top, then search dropdown box for \"Neurology Adult\", select location, press Enter, then look for stroke/neuro ICU/telestroke.    ______________________________________________________    Medications   Home Meds  Prior to Admission medications    Medication Sig Start Date End Date Taking? Authorizing Provider   amoxicillin-clavulanate (AUGMENTIN) 875-125 MG tablet Take 1 tablet by mouth 2 times daily X 10 days (started 11/9/2020)   Yes Unknown, Entered By History   HYDROcodone-acetaminophen (NORCO) 5-325 MG tablet Take 1-2 tablets by mouth 2 times daily as needed for severe pain    Yes Unknown, Entered By History   predniSONE (DELTASONE) 20 MG tablet Take 20 mg by mouth 2 times daily X 5 days (started 11/9/2020)   Yes Unknown, Entered By History   tamsulosin (FLOMAX) 0.4 MG capsule Take 0.4 mg by mouth daily   Yes Unknown, Entered By History       Scheduled Meds    [Held by provider] amLODIPine  10 mg Per Feeding Tube Daily     zz extemporaneous template  50 mg Oral or Feeding Tube Daily    Followed by     [START ON 12/8/2020] zz extemporaneous template  100 mg Oral or Feeding Tube Daily    Followed by     [START ON 12/22/2020] zz extemporaneous template  150 mg Oral or Feeding Tube Daily    Followed by     [START ON 1/5/2021] zz extemporaneous template  175 mg Oral or Feeding Tube Daily     chlorhexidine  15 mL Mouth/Throat Q12H     heparin ANTICOAGULANT  5,000 Units Subcutaneous Q8H     insulin aspart  1-6 Units Subcutaneous Q4H     nystatin  500,000 Units Mouth/Throat 4x Daily     pantoprazole (PROTONIX) IV  40 mg Intravenous BID     piperacillin-tazobactam  4.5 g Intravenous Q6H     predniSONE  60 mg Oral or Feeding Tube Daily    Followed by     [START ON 12/12/2020] predniSONE  50 " mg Oral or Feeding Tube Daily     pyridostigmine  30 mg Oral 4x Daily     QUEtiapine  75 mg Oral BID     sulfamethoxazole-trimethoprim (BACTRIM/SEPTRA) intermittent infusion  400 mg Intravenous Q8H       Infusion Meds    sodium chloride 20 mL/hr at 11/27/20 2000       PRN Meds  acetaminophen **OR** acetaminophen, albuterol, artificial tears ophthalmic solution, glucose **OR** dextrose **OR** glucagon, flumazenil, hydrogen peroxide, labetalol, miconazole, miconazole, [COMPLETED] midazolam **FOLLOWED BY** midazolam, midazolam, naloxone, naloxone, naloxone **OR** naloxone **OR** naloxone **OR** naloxone, naloxone, naloxone, ondansetron **OR** ondansetron, prochlorperazine, senna-docusate **OR** senna-docusate       PHYSICAL EXAMINATION  Temp:  [98  F (36.7  C)-99.2  F (37.3  C)] 98.7  F (37.1  C)  Pulse:  [49-75] 56  Resp:  [11-26] 22  BP: ()/(59-85) 120/68  FiO2 (%):  [30 %-70 %] 30 %  SpO2:  [88 %-100 %] 98 %     Date 11/13 11/14 11/15 11/16 11/17 11/18 11/19 11/20 11/21 11/22 11/23 11/24 11/25 11/26 11/27 11/28 11/29   MIP/NIF -7 -4 -3 -5 -1 -- -- -- -- -- -20 -- -- -- -16 --    Vital Capacity 290 180 240 320 500 -- -- -- -- -- 930 -- -- -- -- --        General:  patient seated upright in chair without any acute distress    HEENT:  normocephalic/atraumatic, edentulous, trach present  Cardio:  RRR  Pulmonary:  on mechanical ventilation  Abdomen:  soft, non-tender, non-distended  Extremities:  no edema, peripheral pulses palpable  Skin:  intact, warm/dry      Neurologic  Mental Status:  examined on no sedation. Eyes open spontaneously. Follows commands reliably, mouths/gestures/nods/shakes to answer questions, writes with left hand and uses letter board.  Cranial Nerves:  VFF, PERRL, minimal ptosis, conjugate rest gaze, intact horizontal eye movements, face symmetric, weak lip purse  Motor: restless, tapping hands and feet, movements still brisk, elbow flexion 5-/5, elbow extension 4+/5, finger flexion 5/5,  finger extension 5/5. Hip flexion 4/5, knee flexion 4+/5, knee extension 4+/5, ankle dorsiflexion 5/5, ankle plantarflexion 5/5. No fasciculations, twitches, or abnormal movements.  Reflexes:  2+ intact and symmetric reflexes in bilateral biceps, brachioradialis, patellae, achilles, toes downgoing  Sensory:  light touch sensation intact and symmetric throughout upper and lower extremities  Coordination:  not tested  Station/Gait:  not tested    Imaging  I personally reviewed all imaging; relevant findings per HPI.     Lab Results Data   CBC  Recent Labs   Lab 11/29/20 0500 11/28/20 0500 11/27/20 2000   WBC 9.6 12.2* 10.9   RBC 3.34* 3.36* 3.68*   HGB 10.0* 10.1* 11.1*   HCT 29.9* 30.4* 33.3*    323 337     Basic Metabolic Panel    Recent Labs   Lab 11/29/20 0500 11/28/20 0500 11/27/20 2000    139 142   POTASSIUM 3.6 3.3* 3.4   CHLORIDE 106 108 109   CO2 24 26 25   BUN 21 32* 33*   CR 0.74 0.78 0.68   * 94 93   JESSICA 7.9* 7.9* 8.2*     Liver Panel  Recent Labs   Lab 11/29/20 0500 11/28/20 0500 11/27/20 2000   PROTTOTAL 6.0* 6.1* 6.9   ALBUMIN 2.2* 2.2* 2.5*   BILITOTAL 0.2 0.5 0.4   ALKPHOS 123 129 152*   AST 23 23 27   * 120* 152*     INR    Recent Labs   Lab Test 11/27/20 2000 11/17/20  0400   INR 1.08 1.20*      Lipid Profile  No lab results found.  A1C    Recent Labs   Lab Test 11/14/20  0402   A1C 6.0*     Troponin I    No results for input(s): TROPI in the last 168 hours.
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
## 2020-11-29 NOTE — PROGRESS NOTES
[facility] ICU RESPIRATORY NOTE           Date of Admission: 11/10/2020     Date of Intubation (most recent): trached 11/19/20     Reason for Mechanical Ventilation: Respiratory Failure     Number of Days on Mechanical Ventilation: 20     Met Criteria for Spontaneous Breathing Trial: No     Significant Events Today: None overnight     ABG Results:   Recent Labs   Lab 11/23/20  1200   PH 7.49*   PCO2 43   PO2 60*   HCO3 33*   O2PER 40     Ventilation Mode: CMV/AC  (Continuous Mandatory Ventilation/ Assist Control)  FiO2 (%): 40 %  Rate Set (breaths/minute): 14 breaths/min  Tidal Volume Set (mL): 500 mL  PEEP (cm H2O): 10 cmH2O  Oxygen Concentration (%): 40 %  Resp: 13    Joao Smallwood, RT

## 2020-11-29 NOTE — PROGRESS NOTES
No changes. A&O. VSS, except desaturated x1 when asleep. Back to 40%, peep 10. Multiple loose BMs, rectal tube placed. Adequate urine output, at times reddened. Tube feed at 10ml. Family updated, supportive. Will cont to monitor.

## 2020-11-29 NOTE — SIGNIFICANT EVENT
Significant Event Note    Time of event: 11:29 AM November 29, 2020    Description of event:  Paged by the patient's respiratory therapist, noting a NIF of -18 on 11/28 for a moderate respiratory effort and -30 today for a good patient effort.    Plan:  Please document DAILY NIFs and Vital capaciy    Discussed with: supervising physician, Dr. Katia NI MD

## 2020-11-30 LAB
ACID FAST STN SPEC QL: NORMAL
ACID FAST STN SPEC QL: NORMAL
ALBUMIN SERPL-MCNC: 2.1 G/DL (ref 3.4–5)
ALP SERPL-CCNC: 119 U/L (ref 40–150)
ALT SERPL W P-5'-P-CCNC: 97 U/L (ref 0–70)
ANION GAP SERPL CALCULATED.3IONS-SCNC: 6 MMOL/L (ref 3–14)
AST SERPL W P-5'-P-CCNC: 23 U/L (ref 0–45)
BASOPHILS # BLD AUTO: 0 10E9/L (ref 0–0.2)
BASOPHILS NFR BLD AUTO: 0.2 %
BILIRUB SERPL-MCNC: 0.2 MG/DL (ref 0.2–1.3)
BUN SERPL-MCNC: 14 MG/DL (ref 7–30)
CA-I SERPL ISE-MCNC: 4.5 MG/DL (ref 4.4–5.2)
CALCIUM SERPL-MCNC: 7.7 MG/DL (ref 8.5–10.1)
CHLORIDE SERPL-SCNC: 109 MMOL/L (ref 94–109)
CO2 SERPL-SCNC: 23 MMOL/L (ref 20–32)
CREAT SERPL-MCNC: 0.83 MG/DL (ref 0.66–1.25)
DIFFERENTIAL METHOD BLD: ABNORMAL
EOSINOPHIL # BLD AUTO: 0.1 10E9/L (ref 0–0.7)
EOSINOPHIL NFR BLD AUTO: 0.7 %
ERYTHROCYTE [DISTWIDTH] IN BLOOD BY AUTOMATED COUNT: 14.4 % (ref 10–15)
GFR SERPL CREATININE-BSD FRML MDRD: >90 ML/MIN/{1.73_M2}
GLUCOSE BLDC GLUCOMTR-MCNC: 101 MG/DL (ref 70–99)
GLUCOSE BLDC GLUCOMTR-MCNC: 122 MG/DL (ref 70–99)
GLUCOSE BLDC GLUCOMTR-MCNC: 127 MG/DL (ref 70–99)
GLUCOSE BLDC GLUCOMTR-MCNC: 79 MG/DL (ref 70–99)
GLUCOSE BLDC GLUCOMTR-MCNC: 94 MG/DL (ref 70–99)
GLUCOSE SERPL-MCNC: 75 MG/DL (ref 70–99)
HCT VFR BLD AUTO: 29.4 % (ref 40–53)
HGB BLD-MCNC: 9.9 G/DL (ref 13.3–17.7)
IMM GRANULOCYTES # BLD: 0.1 10E9/L (ref 0–0.4)
IMM GRANULOCYTES NFR BLD: 1.3 %
LYMPHOCYTES # BLD AUTO: 1.8 10E9/L (ref 0.8–5.3)
LYMPHOCYTES NFR BLD AUTO: 16 %
MAGNESIUM SERPL-MCNC: 2.2 MG/DL (ref 1.6–2.3)
MCH RBC QN AUTO: 29.8 PG (ref 26.5–33)
MCHC RBC AUTO-ENTMCNC: 33.7 G/DL (ref 31.5–36.5)
MCV RBC AUTO: 89 FL (ref 78–100)
MONOCYTES # BLD AUTO: 0.6 10E9/L (ref 0–1.3)
MONOCYTES NFR BLD AUTO: 5 %
NEUTROPHILS # BLD AUTO: 8.4 10E9/L (ref 1.6–8.3)
NEUTROPHILS NFR BLD AUTO: 76.8 %
NRBC # BLD AUTO: 0 10*3/UL
NRBC BLD AUTO-RTO: 0 /100
PHOSPHATE SERPL-MCNC: 2.7 MG/DL (ref 2.5–4.5)
PLATELET # BLD AUTO: 316 10E9/L (ref 150–450)
POTASSIUM SERPL-SCNC: 3.6 MMOL/L (ref 3.4–5.3)
PROT SERPL-MCNC: 6.1 G/DL (ref 6.8–8.8)
RBC # BLD AUTO: 3.32 10E12/L (ref 4.4–5.9)
SODIUM SERPL-SCNC: 138 MMOL/L (ref 133–144)
SPECIMEN SOURCE: NORMAL
TROPONIN I SERPL-MCNC: 0.02 UG/L (ref 0–0.04)
WBC # BLD AUTO: 10.9 10E9/L (ref 4–11)

## 2020-11-30 PROCEDURE — 82330 ASSAY OF CALCIUM: CPT | Performed by: PHYSICIAN ASSISTANT

## 2020-11-30 PROCEDURE — 250N000011 HC RX IP 250 OP 636

## 2020-11-30 PROCEDURE — 99233 SBSQ HOSP IP/OBS HIGH 50: CPT | Performed by: INTERNAL MEDICINE

## 2020-11-30 PROCEDURE — 94003 VENT MGMT INPAT SUBQ DAY: CPT

## 2020-11-30 PROCEDURE — 250N000013 HC RX MED GY IP 250 OP 250 PS 637: Performed by: INTERNAL MEDICINE

## 2020-11-30 PROCEDURE — 85025 COMPLETE CBC W/AUTO DIFF WBC: CPT | Performed by: INTERNAL MEDICINE

## 2020-11-30 PROCEDURE — 80053 COMPREHEN METABOLIC PANEL: CPT | Performed by: PHYSICIAN ASSISTANT

## 2020-11-30 PROCEDURE — 999N000009 HC STATISTIC AIRWAY CARE

## 2020-11-30 PROCEDURE — 250N000011 HC RX IP 250 OP 636: Performed by: PHYSICIAN ASSISTANT

## 2020-11-30 PROCEDURE — 250N000009 HC RX 250: Performed by: STUDENT IN AN ORGANIZED HEALTH CARE EDUCATION/TRAINING PROGRAM

## 2020-11-30 PROCEDURE — 200N000001 HC R&B ICU

## 2020-11-30 PROCEDURE — C9113 INJ PANTOPRAZOLE SODIUM, VIA: HCPCS | Performed by: INTERNAL MEDICINE

## 2020-11-30 PROCEDURE — 84484 ASSAY OF TROPONIN QUANT: CPT | Performed by: PHYSICIAN ASSISTANT

## 2020-11-30 PROCEDURE — 250N000013 HC RX MED GY IP 250 OP 250 PS 637: Performed by: PHYSICIAN ASSISTANT

## 2020-11-30 PROCEDURE — 999N001017 HC STATISTIC GLUCOSE BY METER IP

## 2020-11-30 PROCEDURE — 999N000078 HC STATISTIC INTRAPULMONARY PERCUSSIVE VENT

## 2020-11-30 PROCEDURE — 999N000157 HC STATISTIC RCP TIME EA 10 MIN

## 2020-11-30 PROCEDURE — 250N000013 HC RX MED GY IP 250 OP 250 PS 637: Performed by: STUDENT IN AN ORGANIZED HEALTH CARE EDUCATION/TRAINING PROGRAM

## 2020-11-30 PROCEDURE — 250N000011 HC RX IP 250 OP 636: Performed by: INTERNAL MEDICINE

## 2020-11-30 PROCEDURE — 93005 ELECTROCARDIOGRAM TRACING: CPT

## 2020-11-30 PROCEDURE — 250N000012 HC RX MED GY IP 250 OP 636 PS 637: Performed by: STUDENT IN AN ORGANIZED HEALTH CARE EDUCATION/TRAINING PROGRAM

## 2020-11-30 PROCEDURE — 84100 ASSAY OF PHOSPHORUS: CPT | Performed by: PHYSICIAN ASSISTANT

## 2020-11-30 PROCEDURE — 83735 ASSAY OF MAGNESIUM: CPT | Performed by: PHYSICIAN ASSISTANT

## 2020-11-30 PROCEDURE — 99232 SBSQ HOSP IP/OBS MODERATE 35: CPT | Mod: GC | Performed by: PSYCHIATRY & NEUROLOGY

## 2020-11-30 PROCEDURE — 93010 ELECTROCARDIOGRAM REPORT: CPT | Performed by: INTERNAL MEDICINE

## 2020-11-30 PROCEDURE — 258N000003 HC RX IP 258 OP 636: Performed by: PHYSICIAN ASSISTANT

## 2020-11-30 RX ORDER — ACETAMINOPHEN 325 MG/1
325-650 TABLET ORAL EVERY 4 HOURS PRN
COMMUNITY

## 2020-11-30 RX ORDER — QUETIAPINE FUMARATE 25 MG/1
25 TABLET, FILM COATED ORAL AT BEDTIME
Status: DISCONTINUED | OUTPATIENT
Start: 2020-11-30 | End: 2020-12-05 | Stop reason: HOSPADM

## 2020-11-30 RX ORDER — ASPIRIN 81 MG/1
81 TABLET ORAL DAILY
Status: ON HOLD | COMMUNITY
End: 2020-12-05

## 2020-11-30 RX ADMIN — PYRIDOSTIGMINE BROMIDE 30 MG: 60 TABLET ORAL at 15:19

## 2020-11-30 RX ADMIN — NYSTATIN 500000 UNITS: 100000 SUSPENSION ORAL at 11:10

## 2020-11-30 RX ADMIN — SODIUM CHLORIDE: 9 INJECTION, SOLUTION INTRAVENOUS at 20:50

## 2020-11-30 RX ADMIN — PYRIDOSTIGMINE BROMIDE 30 MG: 60 TABLET ORAL at 20:09

## 2020-11-30 RX ADMIN — PANTOPRAZOLE SODIUM 40 MG: 40 INJECTION, POWDER, FOR SOLUTION INTRAVENOUS at 20:09

## 2020-11-30 RX ADMIN — MELATONIN 5 MG TABLET 5 MG: at 20:09

## 2020-11-30 RX ADMIN — HYDROGEN PEROXIDE: 30 LIQUID TOPICAL at 20:25

## 2020-11-30 RX ADMIN — PIPERACILLIN AND TAZOBACTAM 4.5 G: 4; .5 INJECTION, POWDER, FOR SOLUTION INTRAVENOUS at 15:19

## 2020-11-30 RX ADMIN — PIPERACILLIN AND TAZOBACTAM 4.5 G: 4; .5 INJECTION, POWDER, FOR SOLUTION INTRAVENOUS at 03:59

## 2020-11-30 RX ADMIN — QUETIAPINE 75 MG: 25 TABLET ORAL at 07:19

## 2020-11-30 RX ADMIN — QUETIAPINE 25 MG: 25 TABLET ORAL at 21:38

## 2020-11-30 RX ADMIN — CHLORHEXIDINE GLUCONATE 0.12% ORAL RINSE 15 ML: 1.2 LIQUID ORAL at 20:09

## 2020-11-30 RX ADMIN — HEPARIN SODIUM 5000 UNITS: 5000 INJECTION, SOLUTION INTRAVENOUS; SUBCUTANEOUS at 03:59

## 2020-11-30 RX ADMIN — PANTOPRAZOLE SODIUM 40 MG: 40 INJECTION, POWDER, FOR SOLUTION INTRAVENOUS at 07:19

## 2020-11-30 RX ADMIN — PREDNISONE 60 MG: 20 TABLET ORAL at 07:19

## 2020-11-30 RX ADMIN — NYSTATIN 500000 UNITS: 100000 SUSPENSION ORAL at 05:41

## 2020-11-30 RX ADMIN — PYRIDOSTIGMINE BROMIDE 30 MG: 60 TABLET ORAL at 07:19

## 2020-11-30 RX ADMIN — PYRIDOSTIGMINE BROMIDE 30 MG: 60 TABLET ORAL at 11:10

## 2020-11-30 RX ADMIN — AZATHIOPRINE 50 MG: 50 TABLET ORAL at 10:36

## 2020-11-30 RX ADMIN — PIPERACILLIN AND TAZOBACTAM 4.5 G: 4; .5 INJECTION, POWDER, FOR SOLUTION INTRAVENOUS at 21:38

## 2020-11-30 RX ADMIN — SULFAMETHOXAZOLE AND TRIMETHOPRIM 400 MG: 200; 40 SUSPENSION ORAL at 05:41

## 2020-11-30 RX ADMIN — PIPERACILLIN AND TAZOBACTAM 4.5 G: 4; .5 INJECTION, POWDER, FOR SOLUTION INTRAVENOUS at 09:24

## 2020-11-30 RX ADMIN — SULFAMETHOXAZOLE AND TRIMETHOPRIM 400 MG: 200; 40 SUSPENSION ORAL at 14:47

## 2020-11-30 RX ADMIN — ACETAMINOPHEN 650 MG: 325 TABLET, FILM COATED ORAL at 07:20

## 2020-11-30 RX ADMIN — HEPARIN SODIUM 5000 UNITS: 5000 INJECTION, SOLUTION INTRAVENOUS; SUBCUTANEOUS at 20:09

## 2020-11-30 RX ADMIN — CHLORHEXIDINE GLUCONATE 0.12% ORAL RINSE 15 ML: 1.2 LIQUID ORAL at 07:19

## 2020-11-30 RX ADMIN — PROCHLORPERAZINE EDISYLATE 5 MG: 5 INJECTION INTRAMUSCULAR; INTRAVENOUS at 07:20

## 2020-11-30 RX ADMIN — SULFAMETHOXAZOLE AND TRIMETHOPRIM 400 MG: 200; 40 SUSPENSION ORAL at 21:38

## 2020-11-30 RX ADMIN — HEPARIN SODIUM 5000 UNITS: 5000 INJECTION, SOLUTION INTRAVENOUS; SUBCUTANEOUS at 11:09

## 2020-11-30 ASSESSMENT — MIFFLIN-ST. JEOR: SCORE: 1558.38

## 2020-11-30 ASSESSMENT — ACTIVITIES OF DAILY LIVING (ADL)
ADLS_ACUITY_SCORE: 24
ADLS_ACUITY_SCORE: 24
ADLS_ACUITY_SCORE: 25
ADLS_ACUITY_SCORE: 24
ADLS_ACUITY_SCORE: 25
ADLS_ACUITY_SCORE: 24

## 2020-11-30 NOTE — PROGRESS NOTES
[facility] ICU RESPIRATORY NOTE           Date of Admission: 11/10/2020     Date of Intubation (most recent):11/19/2020     Reason for Mechanical Ventilation:Respiratory failure     Number of Days on Mechanical Ventilation:  21     Met Criteria for Spontaneous Breathing Trial:Yes.     Significant Events Today: None overnight     ABG Results:   Recent Labs   Lab 11/23/20  1200   PH 7.49*   PCO2 43   PO2 60*   HCO3 33*   O2PER 40     Ventilation Mode: CMV/AC  (Continuous Mandatory Ventilation/ Assist Control)  FiO2 (%): 50 %  Rate Set (breaths/minute): 14 breaths/min  Tidal Volume Set (mL): 500 mL  PEEP (cm H2O): 5 cmH2O  Pressure Support (cm H2O): 5 cmH2O  Oxygen Concentration (%): 50 %  Resp: 18    Joao Smallwood, RT

## 2020-11-30 NOTE — PROGRESS NOTES
CLINICAL NUTRITION SERVICES - REASSESSMENT NOTE      RECOMMENDATIONS FOR MD/PROVIDER TO ORDER:  - Defer fluid flushes to MD   Recommendations Ordered by Registered Dietitian (RD):   - Add Nutrisource Fiber 1pkt TID (9 gm additional fiber, 45 kcal)   - When TF at goal rate, would be receiving 23 gm Fiber from Isosource 1.5 alone    - Increase TF to 20 mL/hr per MD    Future/Additional Recommendations:   - May discontinue Fiber pkts once at TF goal, pending stool consistency    Malnutrition: (11/24)  % Weight Loss:  > 5% in 1 month (severe malnutrition)  % Intake:  </= 75% for >/= 1 month (severe malnutrition)  Subcutaneous Fat Loss:  Orbital region mild depletion  Muscle Loss:  Temporal region moderate depletion, Clavicle bone region mild depletion and Acromion bone region mild depletion  Fluid Retention:  Mild      Malnutrition Diagnosis: Severe malnutrition  In Context of:  Acute illness or injury  Chronic illness or disease       EVALUATION OF PROGRESS TOWARD GOALS   Diet:  NPO, vented     Nutrition Support: TF were initially started on 11/11. Feedings were turned off with emesis the morning of 11/25 and remained off until 11/28 at 1600 when they restarted at 10 mL/hr. TF continues at 10 mL/hr today. Orders written for goal rate is as follows ~    Nutrition Support Enteral:  Type of Feeding Tube: NGT  Enteral Frequency:  Continuous  Enteral Regimen: Isosource 1.5 at 65 mL/hr  Total Enteral Provisions: 2340 kcal (29 kcal/kg), 106 g protein (1.3 g/kg), 275 g CHO, 23 g fiber, 1186 mL H2O  Free Water Flush: 60 mL q 4 hrs       Intake/Tolerance:    Patient has been receiving inadequate nutrition for the past 5 days. He is having nausea this AM and MD requested TF turned up to 20 mL/hr to check tolerance. RN reports liquids stools and requests fiber supplement to start      Labs reviewed: Na 138, K 3.6, Mg 2.2, Phos 2.7 --> WNL   Recent Labs   Lab 11/30/20  1104 11/30/20  0727 11/30/20  0405 11/30/20  0130  11/29/20  2341 11/29/20  1929 11/29/20  1633 11/29/20  0500 11/29/20  0500 11/28/20  0500 11/28/20  0500 11/27/20 2000 11/27/20 2000 11/26/20 2015 11/26/20 2015 11/24/20  1020 11/24/20  1020   GLC  --   --   --  75  --   --   --   --  117*  --  94  --  93  --  98  --  Canceled, Test credited  174*   * 94 79  --  88 83 176*   < >  --    < >  --    < >  --    < >  --    < >  --     < > = values in this interval not displayed.     Medications reviewed: prednisone, Propofol off 11/24, levophed off 11/24  Stooling: LBM 11/30 (diarrhea)   Wt: 75.3 kg  Vitals:    11/25/20 0500 11/26/20 0247 11/27/20 0410 11/28/20 0600   Weight: 78.9 kg (173 lb 15.1 oz) 76.7 kg (169 lb 1.5 oz) 75.6 kg (166 lb 10.7 oz) 74.4 kg (164 lb 0.4 oz)    11/30/20 0400   Weight: 75.3 kg (166 lb 0.1 oz)         ASSESSED NUTRITION NEEDS (reasssed):  Dosing Weight: 75.3 kg - 11/30  Estimated Energy Needs: 1978-3550 kcals (25-30 Kcal/Kg)  Justification: maintenance  Estimated Protein Needs:  grams protein (1.2-1.5 g pro/Kg)  Justification: repletion      NEW FINDINGS:   11/28: EGD done --> grade a esophagitis, nonbleeding erosive gastropathy and nonbleeding DU.    Previous Goals:   TF Isosource 1.5 at 65 mL/hr will continue to meet % estimated needs  Evaluation: Not met    Previous Nutrition Diagnosis:   No nutrition diagnosis identified at this time   Evaluation: declining, updated below       CURRENT NUTRITION DIAGNOSIS  Inadequate protein-energy intake related to NPO, vented and TF reliant for nutrition as evidenced by TF running below goal rate for 5 days, meeting <25% estimated needs     INTERVENTIONS  Recommendations / Nutrition Prescription  Adv TF to 20 mL/hr per MD while assessing tolerance w/ nausea   Add fiber TID while below TF goal for stooling     Implementation  Medical Food Supplement: fiber as above  Collaboration and Referral of Nutrition care: patient discussed during ICU rounds     Goals  EN tolerance at 20  mL/hr, ability to advance further within 48 hrs       MONITORING AND EVALUATION:  Progress towards goals will be monitored and evaluated per protocol and Practice Guidelines      Donna Titus RD, LD  Clinical Dietitian

## 2020-11-30 NOTE — PROGRESS NOTES
Urology brief progress note    S:  D/w  No issues with hematuria since we last saw him    O:  NAD  On vent  Urine clear, 3 way albarado in place with inflow port clamped    A/P  Roosevelt Burris is a 60 year old male with PMH of recently diagnosed myasthenia gravis with respiratory failure and urologic hx of acute urinary retention during this admission, prior hx of nocturia on flomax, and a traumatic catheter removal on 11/23 resulting in gross hematuria with a small bladder clot on CT done 11/25. Urology was thus consulted. Bladder irrigated to clear urine with removal of clot, with placement of a 22F three way catheter but CBI not started given urine clearance. Doing well since then    - No further interventions necessary from urology standpoint  - Will defer timing of albarado removal to primary team/clinical improvement.   - If he continues to require an indwelling catheter, we would recommend indwelling catheter exchanges every 4 weeks/1 month, and can switch back to a standard albarado kit catheter (16F albarado) at the next exchange or earlier if he's bothered by the current catheter  - Once pt is closer to discharge and remains unable to void spontaneously, please place outpatient urology consult for follow-up and further management    Urology will sign off at this time, please call with qs/new issues  Discussed with Dr. Sarai De,   Urology Resident

## 2020-11-30 NOTE — PROGRESS NOTES
Consult received.  Patient is seen by [facility].  Please contact [facility]. D/W ICU team.    Alfredo REYES

## 2020-11-30 NOTE — PHARMACY-ADMISSION MEDICATION HISTORY
Pharmacy Medication History  Admission medication history interview status for the 11/10/2020  admission is complete. See EPIC admission navigator for prior to admission medications     Location of Interview: Phone  Medication history sources: Pharmacy ([facility] Tapia & pt's sister)  Medication history source reliability: Good  Adherence assessment: Good    Significant changes made to the medication list:  Removed: augmentin and prednisone. Pt completed therapy  Added: low dose aspirin and tylenol      Additional medication history information:   Pt picks up medications regularly, but family could not comment on adherence.    Medication reconciliation completed by provider prior to medication history? No    Time spent in this activity: 15 minutes      Prior to Admission medications    Medication Sig Last Dose Taking? Auth Provider   HYDROcodone-acetaminophen (NORCO) 5-325 MG tablet Take 1-2 tablets by mouth 2 times daily as needed for severe pain  Past Month at Unknown time Yes Unknown, Entered By History   tamsulosin (FLOMAX) 0.4 MG capsule Take 0.4 mg by mouth daily Past Month at Unknown time Yes Unknown, Entered By History

## 2020-11-30 NOTE — PLAN OF CARE
Patient tolerating vent, VSS. A&Ox4, follows commands, PERRLA. SB 47-48, MD notified, labs and EKG done. Thick creamy secretions. Weston changed due to bag leaking. Continue to monitor.
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
## 2020-11-30 NOTE — PROGRESS NOTES
[facility] ICU RESPIRATORY NOTE        Date of Admission: 11/10/2020    Date of Intubation (most recent): 11/19/2020    Reason for Mechanical Ventilation: Respiratory Failure    Number of Days on Mechanical Ventilation: 21    Met Criteria for Spontaneous Breathing Trial:Yes was on Cpap/ps for 2 hours then switched to a highflow t-piece at 50% and flow of 35      Significant Events Today: T-piece    ABG Results: No lab results found in last 7 days.    Current Vent Settings: Ventilation Mode: T-piece  FiO2 (%): 50 %  Rate Set (breaths/minute): 14 breaths/min  Tidal Volume Set (mL): 500 mL  PEEP (cm H2O): 5 cmH2O  Pressure Support (cm H2O): 5 cmH2O  Oxygen Concentration (%): 50 %  Resp: 25      Skin Assessment: No issues    Plan: Continue to wean on t-piece during the day as tolerates. Place back on vent at night.    Perry Harris, RT

## 2020-11-30 NOTE — PROGRESS NOTES
Brief ICU Note    Notified by nurse regarding new bradycardia ~ 40s. I don't see a history of this. Patient is asymptomatic and hemodynamically normal.     Mestinon just started so I suspect this is the culprit. As long as BP ok, would monitor    Will check EKG, lytes, trop to r/o cardiac causes,etc.     Edward Martin MD  Critical Care Surgery

## 2020-12-01 LAB
ALBUMIN SERPL-MCNC: 2.5 G/DL (ref 3.4–5)
ALP SERPL-CCNC: 138 U/L (ref 40–150)
ALT SERPL W P-5'-P-CCNC: 95 U/L (ref 0–70)
ANION GAP SERPL CALCULATED.3IONS-SCNC: 8 MMOL/L (ref 3–14)
AST SERPL W P-5'-P-CCNC: 31 U/L (ref 0–45)
BASOPHILS # BLD AUTO: 0 10E9/L (ref 0–0.2)
BASOPHILS NFR BLD AUTO: 0.3 %
BILIRUB SERPL-MCNC: 0.2 MG/DL (ref 0.2–1.3)
BUN SERPL-MCNC: 15 MG/DL (ref 7–30)
CALCIUM SERPL-MCNC: 8.2 MG/DL (ref 8.5–10.1)
CHLORIDE SERPL-SCNC: 105 MMOL/L (ref 94–109)
CO2 SERPL-SCNC: 23 MMOL/L (ref 20–32)
CREAT SERPL-MCNC: 0.87 MG/DL (ref 0.66–1.25)
DIFFERENTIAL METHOD BLD: ABNORMAL
EOSINOPHIL # BLD AUTO: 0.1 10E9/L (ref 0–0.7)
EOSINOPHIL NFR BLD AUTO: 1 %
ERYTHROCYTE [DISTWIDTH] IN BLOOD BY AUTOMATED COUNT: 14.9 % (ref 10–15)
GFR SERPL CREATININE-BSD FRML MDRD: >90 ML/MIN/{1.73_M2}
GLUCOSE BLDC GLUCOMTR-MCNC: 103 MG/DL (ref 70–99)
GLUCOSE BLDC GLUCOMTR-MCNC: 164 MG/DL (ref 70–99)
GLUCOSE SERPL-MCNC: 98 MG/DL (ref 70–99)
H PYLORI AG STL QL IA: NEGATIVE
HCT VFR BLD AUTO: 33.7 % (ref 40–53)
HGB BLD-MCNC: 11.4 G/DL (ref 13.3–17.7)
IMM GRANULOCYTES # BLD: 0.2 10E9/L (ref 0–0.4)
IMM GRANULOCYTES NFR BLD: 1.4 %
LYMPHOCYTES # BLD AUTO: 1.8 10E9/L (ref 0.8–5.3)
LYMPHOCYTES NFR BLD AUTO: 13.4 %
MCH RBC QN AUTO: 29.9 PG (ref 26.5–33)
MCHC RBC AUTO-ENTMCNC: 33.8 G/DL (ref 31.5–36.5)
MCV RBC AUTO: 89 FL (ref 78–100)
MONOCYTES # BLD AUTO: 0.5 10E9/L (ref 0–1.3)
MONOCYTES NFR BLD AUTO: 3.4 %
NEUTROPHILS # BLD AUTO: 10.5 10E9/L (ref 1.6–8.3)
NEUTROPHILS NFR BLD AUTO: 80.5 %
NRBC # BLD AUTO: 0 10*3/UL
NRBC BLD AUTO-RTO: 0 /100
PLATELET # BLD AUTO: 376 10E9/L (ref 150–450)
POTASSIUM SERPL-SCNC: 3.7 MMOL/L (ref 3.4–5.3)
PROT SERPL-MCNC: 6.7 G/DL (ref 6.8–8.8)
RBC # BLD AUTO: 3.81 10E12/L (ref 4.4–5.9)
SODIUM SERPL-SCNC: 136 MMOL/L (ref 133–144)
WBC # BLD AUTO: 13.1 10E9/L (ref 4–11)

## 2020-12-01 PROCEDURE — 250N000013 HC RX MED GY IP 250 OP 250 PS 637: Performed by: PHYSICIAN ASSISTANT

## 2020-12-01 PROCEDURE — 85025 COMPLETE CBC W/AUTO DIFF WBC: CPT | Performed by: INTERNAL MEDICINE

## 2020-12-01 PROCEDURE — 250N000011 HC RX IP 250 OP 636: Performed by: INTERNAL MEDICINE

## 2020-12-01 PROCEDURE — 250N000013 HC RX MED GY IP 250 OP 250 PS 637: Performed by: STUDENT IN AN ORGANIZED HEALTH CARE EDUCATION/TRAINING PROGRAM

## 2020-12-01 PROCEDURE — C9113 INJ PANTOPRAZOLE SODIUM, VIA: HCPCS | Performed by: INTERNAL MEDICINE

## 2020-12-01 PROCEDURE — 250N000012 HC RX MED GY IP 250 OP 636 PS 637: Performed by: STUDENT IN AN ORGANIZED HEALTH CARE EDUCATION/TRAINING PROGRAM

## 2020-12-01 PROCEDURE — 87338 HPYLORI STOOL AG IA: CPT | Performed by: INTERNAL MEDICINE

## 2020-12-01 PROCEDURE — 200N000001 HC R&B ICU

## 2020-12-01 PROCEDURE — 250N000011 HC RX IP 250 OP 636: Performed by: PHYSICIAN ASSISTANT

## 2020-12-01 PROCEDURE — 80053 COMPREHEN METABOLIC PANEL: CPT | Performed by: INTERNAL MEDICINE

## 2020-12-01 PROCEDURE — 999N001017 HC STATISTIC GLUCOSE BY METER IP

## 2020-12-01 PROCEDURE — 250N000013 HC RX MED GY IP 250 OP 250 PS 637: Performed by: INTERNAL MEDICINE

## 2020-12-01 PROCEDURE — 999N000157 HC STATISTIC RCP TIME EA 10 MIN

## 2020-12-01 PROCEDURE — 94003 VENT MGMT INPAT SUBQ DAY: CPT

## 2020-12-01 PROCEDURE — 272N000078 HC NUTRITION PRODUCT INTERMEDIATE LITER

## 2020-12-01 PROCEDURE — 99232 SBSQ HOSP IP/OBS MODERATE 35: CPT | Mod: GC | Performed by: PSYCHIATRY & NEUROLOGY

## 2020-12-01 PROCEDURE — 94150 VITAL CAPACITY TEST: CPT

## 2020-12-01 PROCEDURE — 99233 SBSQ HOSP IP/OBS HIGH 50: CPT | Performed by: INTERNAL MEDICINE

## 2020-12-01 RX ADMIN — PROCHLORPERAZINE EDISYLATE 5 MG: 5 INJECTION INTRAMUSCULAR; INTRAVENOUS at 08:02

## 2020-12-01 RX ADMIN — PYRIDOSTIGMINE BROMIDE 30 MG: 60 TABLET ORAL at 19:49

## 2020-12-01 RX ADMIN — PIPERACILLIN AND TAZOBACTAM 4.5 G: 4; .5 INJECTION, POWDER, FOR SOLUTION INTRAVENOUS at 04:39

## 2020-12-01 RX ADMIN — PYRIDOSTIGMINE BROMIDE 30 MG: 60 TABLET ORAL at 15:15

## 2020-12-01 RX ADMIN — HEPARIN SODIUM 5000 UNITS: 5000 INJECTION, SOLUTION INTRAVENOUS; SUBCUTANEOUS at 11:31

## 2020-12-01 RX ADMIN — NYSTATIN 500000 UNITS: 100000 SUSPENSION ORAL at 11:31

## 2020-12-01 RX ADMIN — ACETAMINOPHEN 650 MG: 325 TABLET, FILM COATED ORAL at 08:03

## 2020-12-01 RX ADMIN — PIPERACILLIN AND TAZOBACTAM 4.5 G: 4; .5 INJECTION, POWDER, FOR SOLUTION INTRAVENOUS at 21:58

## 2020-12-01 RX ADMIN — SULFAMETHOXAZOLE AND TRIMETHOPRIM 400 MG: 200; 40 SUSPENSION ORAL at 06:08

## 2020-12-01 RX ADMIN — CHLORHEXIDINE GLUCONATE 0.12% ORAL RINSE 15 ML: 1.2 LIQUID ORAL at 08:03

## 2020-12-01 RX ADMIN — PANTOPRAZOLE SODIUM 40 MG: 40 INJECTION, POWDER, FOR SOLUTION INTRAVENOUS at 19:49

## 2020-12-01 RX ADMIN — PIPERACILLIN AND TAZOBACTAM 4.5 G: 4; .5 INJECTION, POWDER, FOR SOLUTION INTRAVENOUS at 09:57

## 2020-12-01 RX ADMIN — NYSTATIN 500000 UNITS: 100000 SUSPENSION ORAL at 06:08

## 2020-12-01 RX ADMIN — QUETIAPINE 25 MG: 25 TABLET ORAL at 22:01

## 2020-12-01 RX ADMIN — HEPARIN SODIUM 5000 UNITS: 5000 INJECTION, SOLUTION INTRAVENOUS; SUBCUTANEOUS at 04:39

## 2020-12-01 RX ADMIN — MELATONIN 5 MG TABLET 5 MG: at 19:49

## 2020-12-01 RX ADMIN — HEPARIN SODIUM 5000 UNITS: 5000 INJECTION, SOLUTION INTRAVENOUS; SUBCUTANEOUS at 19:49

## 2020-12-01 RX ADMIN — CHLORHEXIDINE GLUCONATE 0.12% ORAL RINSE 15 ML: 1.2 LIQUID ORAL at 20:02

## 2020-12-01 RX ADMIN — PIPERACILLIN AND TAZOBACTAM 4.5 G: 4; .5 INJECTION, POWDER, FOR SOLUTION INTRAVENOUS at 15:15

## 2020-12-01 RX ADMIN — PYRIDOSTIGMINE BROMIDE 30 MG: 60 TABLET ORAL at 08:03

## 2020-12-01 RX ADMIN — SULFAMETHOXAZOLE AND TRIMETHOPRIM 400 MG: 200; 40 SUSPENSION ORAL at 15:15

## 2020-12-01 RX ADMIN — SULFAMETHOXAZOLE AND TRIMETHOPRIM 400 MG: 200; 40 SUSPENSION ORAL at 22:01

## 2020-12-01 RX ADMIN — PYRIDOSTIGMINE BROMIDE 30 MG: 60 TABLET ORAL at 11:31

## 2020-12-01 RX ADMIN — PANTOPRAZOLE SODIUM 40 MG: 40 INJECTION, POWDER, FOR SOLUTION INTRAVENOUS at 08:03

## 2020-12-01 RX ADMIN — PREDNISONE 60 MG: 20 TABLET ORAL at 08:03

## 2020-12-01 ASSESSMENT — ACTIVITIES OF DAILY LIVING (ADL)
ADLS_ACUITY_SCORE: 24

## 2020-12-01 ASSESSMENT — MIFFLIN-ST. JEOR
SCORE: 1546.38
SCORE: 1570.38

## 2020-12-01 NOTE — PROGRESS NOTES
Date of Admission: 11/10/2020     Date of Intubation (most recent): 11/19/2020     Reason for Mechanical Ventilation: Respiratory Failure     Number of Days on Mechanical Ventilation: 21     Met Criteria for Spontaneous Breathing Trial:Yes was on a highflow t-piece at 50% and flow of 35, switched back to the full support at 10;20pm for overnight  No lab results found in last 7 days.  Ventilation Mode: T-piece  FiO2 (%): 50 %  Rate Set (breaths/minute): 14 breaths/min  Tidal Volume Set (mL): 500 mL  PEEP (cm H2O): 5 cmH2O  Pressure Support (cm H2O): 5 cmH2O  Oxygen Concentration (%): 50 %  Resp: (!) 31    Plan; continue ventilatory support SBT's daily
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
## 2020-12-01 NOTE — PROGRESS NOTES
"[facility] - GASTROENTEROLOGY PROGRESS NOTE     IMPRESSION:  1. LA grade A reflux esophagitis  2. Duodenal ulcers - on EGD 20, these are superficial and small. Ok to give steroids at whatever dose or taper is needed to treat the myasthenia crisis       RECOMMENDATIONS:  1. Ok to give steroids at any dose or taper needed for treatment of myasthenia  2. Would recommend BID PPI while on prednisone, needs at least 2 months BID PPI given the ulcers.   3. After 2 months or the end of the steroid taper, ok to decrease PPI to once a day and treat indefinitely.    Gerald Jaramillo MD  [facility]  Cell [phone]  After 5 PM, please call [phone]  ________________________________________________________________________      SUBJECTIVE:  Patient is able to nod to answer questions. No abdominal pain       OBJECTIVE:  /82   Pulse 75   Temp 98.6  F (37  C) (Oral)   Resp 21   Ht 1.753 m (5' 9\")   Wt 74.1 kg (163 lb 5.8 oz)   SpO2 97%   BMI 24.12 kg/m    Temp (24hrs), Av.5  F (36.9  C), Min:98  F (36.7  C), Max:98.7  F (37.1  C)    Patient Vitals for the past 72 hrs:   Weight   20 0630 74.1 kg (163 lb 5.8 oz)   20 0600 76.5 kg (168 lb 10.4 oz)   20 0400 75.3 kg (166 lb 0.1 oz)        PHYSICAL EXAM  GEN: Alert, NAD.    ABD: +BS, non-tender, non-distended, no rebound or guarding.    Additional Data:  I have reviewed the patient's new clinical lab results:     Recent Labs   Lab Test 20  0622 20  0400 20  0500 20  0400 20  0400   WBC 13.1* 10.9 9.6   < > 10.9   < >  --    HGB 11.4* 9.9* 10.0*   < > 11.1*   < >  --    MCV 89 89 90   < > 91   < >  --     316 305   < > 337   < >  --    INR  --   --   --   --  1.08  --  1.20*    < > = values in this interval not displayed.     Recent Labs   Lab Test 20  0622 20  0130 20  0500   POTASSIUM 3.7 3.6 3.6   CHLORIDE 105 109 106   CO2 23 23 24   BUN 15 14 21 "   ANIONGAP 8 6 5     Recent Labs   Lab Test 12/01/20  0622 11/30/20  0130 11/29/20  0500 11/11/20  0100 11/11/20  0100   ALBUMIN 2.5* 2.1* 2.2*   < >  --    BILITOTAL 0.2 0.2 0.2   < >  --    ALT 95* 97* 106*   < >  --    AST 31 23 23   < >  --    PROTEIN  --   --   --   --  30*    < > = values in this interval not displayed.

## 2020-12-01 NOTE — PROGRESS NOTES
Chart reviewed, patient was seen for a complete reassessment yesterday -- see note dated 11/30/20 for details.    Currently receiving Isosource 1.5 at 20 mL/hr  Was supposed to start on NutriSource Fiber yesterday but looks like it wasn't ordered    ASSESSED NUTRITION NEEDS (reasssed):  Dosing Weight: 75.3 kg - 11/30  Estimated Energy Needs: 4786-4815 kcals (25-30 Kcal/Kg)  Justification: maintenance  Estimated Protein Needs:  grams protein (1.2-1.5 g pro/Kg)  Justification: repletion    Patient was discussed today during rounds - TF has been running at 20 mL/hr due to nausea  Noted FT tip in the duodenum so increased TF rates should not be causing nausea    OK to increase TF towards goal per Dr. Queen    Orders written to increase TF rate to 25 mL/hr now and advance every 12 hours by 20 mL to goal of Isosource 1.5 at 65 mL/hr to provide:  2340 kcal (29 kcal/kg), 106 g protein (1.3 g/kg), 275 g CHO, 23 g fiber, 1186 mL H2O    No additional fiber planned as patient will be getting 23 grams of fiber once TF at goal    Tanisha Echols, RD, LD, CNSC   Clinical Dietitian - [facility]

## 2020-12-01 NOTE — PROGRESS NOTES
[facility]    Stroke/Neurocritical Care Progress Note    Interval Events  Neuroexam remains stable.  Overnight, HR in 70s-80's, no more bradycardia noted. H/H stable at 11/34 from 9/29 yesterday.    Impression  1. 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 Anti CRMP-5 negative with remainder paraneoplastic antibodies sent on 11/11 returned negative.  Encouraging response to PLEX x5 (11/12-19), methylpred 500 mg IV q12 x10 (11/12-16), and IVIg x4 (11/19-22) with regard to best bulbar and limb exams over the past few days, but delayed respiratory status response necessitated trach. Hopefully will recover quickly enough that he does not need a PEG and can wean off of NG in the next few weeks.    - C/w Prednisone: 60 mg every day -  down-titration at one month from initiation [ however, may need to be re-addressed in view of GI blood loss - will re-discuss with GI ([facility] reconsulted)]   - C/w Azathioprine 50 mg daily, increase by 50 mg every 2 weeks until goal (175 mg daily)  - C/w pyridostigmine 30mg reduced to Q4 hrs while awake   - Last NIF/VC on 12/1 was -30/1100  - C/w Daily NIFs and VC  - Follow up with Dr. Greenwood of [facility] post-hospital for ongoing management (he may have a [place] or [place] outreach clinic available for this patient from [place])    2. Respiratory paralysis- secondary to #1, following daily inspiratory forces/tidal volumes as tolerated in response to therapy    3. Lung  "consolidation- s/p 7 day course of unasyn.     4.  Aspiration Pneumonia - bronchoscopy completed 11/25 with moderate thick tan secretions, consistent with aspiration of tube feeds.  - Remains afebrile, with now resolved leukocytosis (~10 from ~17-18 on 11/24-11/25).   - Vanc/Zosyn started 11/26 for Aspiration PNA -with subsequent fungal cultures growing pansensitive Enterobacter cloacae/stenotrophomonas m. with antibiotic de-escalation to Zosyn  - Remains on Bactrim for PCP prophylaxis     5. Upper GI bleeding  - Overnight on 11/28 NG suctioned 150 mls medina red blood  - Status post EGD 11/28 that showed erosive esophagitis and nonbleeding gastropathy and duodenal ulcers  - Multiple bowel movements overnight 11/29: stool guaiac test pending.  - He remains on systemic steroids for management of myasthenic crisis.  H&H had slowly drifted down from an admission hemoglobin ~14 to 9.9 on 11/20. Will re-discuss with GI ([facility]) regarding their recommendation on on steroid dosing in view of gastropathy and duodenal ulcers.   - C/w Protonix 40 IV bid    6. Insomnia  - Multifactorial: Systemic steroid use, prolonged ICU stay  - C/w 25mg at bedtime and c/w 5 mg melatonin at 7 PM nightly [ordered].   - Okay to restrict nighttime fingerstick glucose checks and neuro checks to every 8 hours to allow for adequate sleep.    The Stroke/Neurocritical Care Staff is Dr. Montalvo.     Miguel Hernandez MD  Vascular Neurology Fellow  To page me or covering stroke neurology team member, click here: AMCOM   Choose \"On Call\" tab at top, then search dropdown box for \"Neurology Adult\", select location, press Enter, then look for stroke/neuro ICU/telestroke.    ______________________________________________________    Medications   Home Meds  Prior to Admission medications    Medication Sig Start Date End Date Taking? Authorizing Provider   amoxicillin-clavulanate (AUGMENTIN) 875-125 MG tablet Take 1 tablet by mouth 2 times daily X 10 days (started " 11/9/2020)   Yes Unknown, Entered By History   HYDROcodone-acetaminophen (NORCO) 5-325 MG tablet Take 1-2 tablets by mouth 2 times daily as needed for severe pain    Yes Unknown, Entered By History   predniSONE (DELTASONE) 20 MG tablet Take 20 mg by mouth 2 times daily X 5 days (started 11/9/2020)   Yes Unknown, Entered By History   tamsulosin (FLOMAX) 0.4 MG capsule Take 0.4 mg by mouth daily   Yes Unknown, Entered By History       Scheduled Meds    [Held by provider] amLODIPine  10 mg Per Feeding Tube Daily     zz extemporaneous template  50 mg Oral or Feeding Tube Daily    Followed by     [START ON 12/8/2020] zz extemporaneous template  100 mg Oral or Feeding Tube Daily    Followed by     [START ON 12/22/2020] zz extemporaneous template  150 mg Oral or Feeding Tube Daily    Followed by     [START ON 1/5/2021] zz extemporaneous template  175 mg Oral or Feeding Tube Daily     chlorhexidine  15 mL Mouth/Throat Q12H     heparin ANTICOAGULANT  5,000 Units Subcutaneous Q8H     insulin aspart  1-6 Units Subcutaneous Q8H     melatonin  5 mg Oral At Bedtime     nystatin  500,000 Units Mouth/Throat 4x Daily     pantoprazole (PROTONIX) IV  40 mg Intravenous BID     piperacillin-tazobactam  4.5 g Intravenous Q6H     predniSONE  60 mg Oral or Feeding Tube Daily    Followed by     [START ON 12/12/2020] predniSONE  50 mg Oral or Feeding Tube Daily     pyridostigmine  30 mg Oral 4x Daily     QUEtiapine  25 mg Oral At Bedtime     sulfamethoxazole-trimethoprim  50 mL Oral or Feeding Tube Q8H       Infusion Meds    sodium chloride 20 mL/hr at 11/30/20 2050       PRN Meds  acetaminophen **OR** acetaminophen, albuterol, artificial tears ophthalmic solution, glucose **OR** dextrose **OR** glucagon, hydrogen peroxide, labetalol, miconazole, miconazole, midazolam, naloxone **OR** naloxone **OR** naloxone **OR** naloxone, ondansetron **OR** ondansetron, prochlorperazine, senna-docusate **OR** senna-docusate       PHYSICAL  EXAMINATION  Temp:  [98  F (36.7  C)-98.7  F (37.1  C)] 98.6  F (37  C)  Pulse:  [63-81] 75  Resp:  [15-34] 21  BP: (115-130)/(58-86) 123/82  FiO2 (%):  [50 %] 50 %  SpO2:  [86 %-97 %] 97 %     Date 11/13 11/14 11/15 11/16 11/17 11/23 11/27 12/1     [facility]/NIF -7 -4 -3 -5 -1 -20 -16 -30     Vital Capacity 290 180 240 320 500 930 -- 1100         General:  patient without any acute distress    HEENT:  normocephalic/atraumatic, edentulous, trach present  Cardio:  RRR  Pulmonary: s/p Trach on CPAP  Abdomen:  soft, mild midabdominal tenderness, non-distended  Extremities:  no edema, peripheral pulses palpable  Skin:  intact, warm/dry      Neurologic  Mental Status:  examined on no sedation. Eyes open spontaneously. Follows commands reliably, mouths/gestures/nods/shakes to answer questions, writes with left hand and uses letter board.  Cranial Nerves:  VFF, PERRL, minimal ptosis, conjugate rest gaze, intact horizontal eye movements, face symmetric, weak lip purse  Motor: restless, tapping hands and feet, movements still brisk, elbow flexion 5-/5, elbow extension 4+/5, finger flexion 5/5, finger extension 5/5. Hip flexion 4/5, knee flexion 4+/5, knee extension 4+/5, ankle dorsiflexion 5/5, ankle plantarflexion 5/5. No fasciculations, twitches, or abnormal movements.  Reflexes:  2+ intact and symmetric reflexes in bilateral biceps, brachioradialis, patellae, achilles, toes downgoing  Sensory:  light touch sensation intact and symmetric throughout upper and lower extremities  Coordination:  not tested  Station/Gait:  not tested    Imaging  I personally reviewed all imaging; relevant findings per HPI.     Lab Results Data   CBC  Recent Labs   Lab 12/01/20  0622 11/30/20  0400 11/29/20  0500   WBC 13.1* 10.9 9.6   RBC 3.81* 3.32* 3.34*   HGB 11.4* 9.9* 10.0*   HCT 33.7* 29.4* 29.9*    316 305     Basic Metabolic Panel    Recent Labs   Lab 12/01/20  0622 11/30/20  0130 11/29/20  0500    138 135   POTASSIUM 3.7 3.6 3.6    CHLORIDE 105 109 106   CO2 23 23 24   BUN 15 14 21   CR 0.87 0.83 0.74   GLC 98 75 117*   JESSICA 8.2* 7.7* 7.9*     Liver Panel  Recent Labs   Lab 12/01/20  0622 11/30/20  0130 11/29/20  0500   PROTTOTAL 6.7* 6.1* 6.0*   ALBUMIN 2.5* 2.1* 2.2*   BILITOTAL 0.2 0.2 0.2   ALKPHOS 138 119 123   AST 31 23 23   ALT 95* 97* 106*     INR    Recent Labs   Lab Test 11/27/20 2000 11/17/20  0400   INR 1.08 1.20*      Lipid Profile  No lab results found.  A1C    Recent Labs   Lab Test 11/14/20  0402   A1C 6.0*     Troponin I    Recent Labs   Lab 11/30/20  0130   TROPI 0.021

## 2020-12-02 LAB
ALBUMIN SERPL-MCNC: 2.4 G/DL (ref 3.4–5)
ALP SERPL-CCNC: 151 U/L (ref 40–150)
ALT SERPL W P-5'-P-CCNC: 85 U/L (ref 0–70)
ANION GAP SERPL CALCULATED.3IONS-SCNC: 7 MMOL/L (ref 3–14)
AST SERPL W P-5'-P-CCNC: 25 U/L (ref 0–45)
BASOPHILS # BLD AUTO: 0 10E9/L (ref 0–0.2)
BASOPHILS NFR BLD AUTO: 0.2 %
BILIRUB SERPL-MCNC: 0.2 MG/DL (ref 0.2–1.3)
BUN SERPL-MCNC: 18 MG/DL (ref 7–30)
CALCIUM SERPL-MCNC: 8.1 MG/DL (ref 8.5–10.1)
CHLORIDE SERPL-SCNC: 106 MMOL/L (ref 94–109)
CO2 SERPL-SCNC: 23 MMOL/L (ref 20–32)
CREAT SERPL-MCNC: 0.79 MG/DL (ref 0.66–1.25)
DIFFERENTIAL METHOD BLD: ABNORMAL
EOSINOPHIL # BLD AUTO: 0.2 10E9/L (ref 0–0.7)
EOSINOPHIL NFR BLD AUTO: 2 %
ERYTHROCYTE [DISTWIDTH] IN BLOOD BY AUTOMATED COUNT: 15.4 % (ref 10–15)
GFR SERPL CREATININE-BSD FRML MDRD: >90 ML/MIN/{1.73_M2}
GLUCOSE BLDC GLUCOMTR-MCNC: 104 MG/DL (ref 70–99)
GLUCOSE BLDC GLUCOMTR-MCNC: 125 MG/DL (ref 70–99)
GLUCOSE BLDC GLUCOMTR-MCNC: 162 MG/DL (ref 70–99)
GLUCOSE BLDC GLUCOMTR-MCNC: 176 MG/DL (ref 70–99)
GLUCOSE SERPL-MCNC: 142 MG/DL (ref 70–99)
HCT VFR BLD AUTO: 35.2 % (ref 40–53)
HGB BLD-MCNC: 11.8 G/DL (ref 13.3–17.7)
IMM GRANULOCYTES # BLD: 0.2 10E9/L (ref 0–0.4)
IMM GRANULOCYTES NFR BLD: 2.1 %
LYMPHOCYTES # BLD AUTO: 1.5 10E9/L (ref 0.8–5.3)
LYMPHOCYTES NFR BLD AUTO: 13.6 %
MCH RBC QN AUTO: 29.9 PG (ref 26.5–33)
MCHC RBC AUTO-ENTMCNC: 33.5 G/DL (ref 31.5–36.5)
MCV RBC AUTO: 89 FL (ref 78–100)
MONOCYTES # BLD AUTO: 0.5 10E9/L (ref 0–1.3)
MONOCYTES NFR BLD AUTO: 4.6 %
NEUTROPHILS # BLD AUTO: 8.5 10E9/L (ref 1.6–8.3)
NEUTROPHILS NFR BLD AUTO: 77.5 %
NRBC # BLD AUTO: 0 10*3/UL
NRBC BLD AUTO-RTO: 0 /100
PLATELET # BLD AUTO: 413 10E9/L (ref 150–450)
POTASSIUM SERPL-SCNC: 4 MMOL/L (ref 3.4–5.3)
PROT SERPL-MCNC: 6.5 G/DL (ref 6.8–8.8)
RBC # BLD AUTO: 3.95 10E12/L (ref 4.4–5.9)
SODIUM SERPL-SCNC: 136 MMOL/L (ref 133–144)
WBC # BLD AUTO: 10.9 10E9/L (ref 4–11)

## 2020-12-02 PROCEDURE — 80053 COMPREHEN METABOLIC PANEL: CPT | Performed by: INTERNAL MEDICINE

## 2020-12-02 PROCEDURE — 85025 COMPLETE CBC W/AUTO DIFF WBC: CPT | Performed by: INTERNAL MEDICINE

## 2020-12-02 PROCEDURE — 250N000011 HC RX IP 250 OP 636: Performed by: INTERNAL MEDICINE

## 2020-12-02 PROCEDURE — 94003 VENT MGMT INPAT SUBQ DAY: CPT

## 2020-12-02 PROCEDURE — 272N000078 HC NUTRITION PRODUCT INTERMEDIATE LITER

## 2020-12-02 PROCEDURE — 99233 SBSQ HOSP IP/OBS HIGH 50: CPT | Performed by: INTERNAL MEDICINE

## 2020-12-02 PROCEDURE — 250N000013 HC RX MED GY IP 250 OP 250 PS 637: Performed by: INTERNAL MEDICINE

## 2020-12-02 PROCEDURE — 250N000013 HC RX MED GY IP 250 OP 250 PS 637: Performed by: PHYSICIAN ASSISTANT

## 2020-12-02 PROCEDURE — 200N000001 HC R&B ICU

## 2020-12-02 PROCEDURE — C9113 INJ PANTOPRAZOLE SODIUM, VIA: HCPCS | Performed by: INTERNAL MEDICINE

## 2020-12-02 PROCEDURE — 999N001017 HC STATISTIC GLUCOSE BY METER IP

## 2020-12-02 PROCEDURE — 94150 VITAL CAPACITY TEST: CPT

## 2020-12-02 PROCEDURE — 99232 SBSQ HOSP IP/OBS MODERATE 35: CPT | Mod: GC | Performed by: PSYCHIATRY & NEUROLOGY

## 2020-12-02 PROCEDURE — 250N000013 HC RX MED GY IP 250 OP 250 PS 637: Performed by: STUDENT IN AN ORGANIZED HEALTH CARE EDUCATION/TRAINING PROGRAM

## 2020-12-02 PROCEDURE — 250N000012 HC RX MED GY IP 250 OP 636 PS 637: Performed by: STUDENT IN AN ORGANIZED HEALTH CARE EDUCATION/TRAINING PROGRAM

## 2020-12-02 PROCEDURE — 250N000009 HC RX 250: Performed by: STUDENT IN AN ORGANIZED HEALTH CARE EDUCATION/TRAINING PROGRAM

## 2020-12-02 PROCEDURE — 999N000157 HC STATISTIC RCP TIME EA 10 MIN

## 2020-12-02 PROCEDURE — 250N000011 HC RX IP 250 OP 636: Performed by: PHYSICIAN ASSISTANT

## 2020-12-02 RX ADMIN — HEPARIN SODIUM 5000 UNITS: 5000 INJECTION, SOLUTION INTRAVENOUS; SUBCUTANEOUS at 21:07

## 2020-12-02 RX ADMIN — HEPARIN SODIUM 5000 UNITS: 5000 INJECTION, SOLUTION INTRAVENOUS; SUBCUTANEOUS at 11:05

## 2020-12-02 RX ADMIN — CHLORHEXIDINE GLUCONATE 0.12% ORAL RINSE 15 ML: 1.2 LIQUID ORAL at 21:07

## 2020-12-02 RX ADMIN — PIPERACILLIN AND TAZOBACTAM 4.5 G: 4; .5 INJECTION, POWDER, FOR SOLUTION INTRAVENOUS at 03:37

## 2020-12-02 RX ADMIN — PREDNISONE 60 MG: 20 TABLET ORAL at 09:09

## 2020-12-02 RX ADMIN — PROCHLORPERAZINE EDISYLATE 5 MG: 5 INJECTION INTRAMUSCULAR; INTRAVENOUS at 09:09

## 2020-12-02 RX ADMIN — PANTOPRAZOLE SODIUM 40 MG: 40 INJECTION, POWDER, FOR SOLUTION INTRAVENOUS at 21:08

## 2020-12-02 RX ADMIN — ACETAMINOPHEN 650 MG: 325 TABLET, FILM COATED ORAL at 09:09

## 2020-12-02 RX ADMIN — PIPERACILLIN AND TAZOBACTAM 4.5 G: 4; .5 INJECTION, POWDER, FOR SOLUTION INTRAVENOUS at 09:21

## 2020-12-02 RX ADMIN — SULFAMETHOXAZOLE AND TRIMETHOPRIM 400 MG: 200; 40 SUSPENSION ORAL at 13:46

## 2020-12-02 RX ADMIN — MELATONIN 5 MG TABLET 5 MG: at 21:08

## 2020-12-02 RX ADMIN — PYRIDOSTIGMINE BROMIDE 30 MG: 60 TABLET ORAL at 15:13

## 2020-12-02 RX ADMIN — CHLORHEXIDINE GLUCONATE 0.12% ORAL RINSE 15 ML: 1.2 LIQUID ORAL at 09:09

## 2020-12-02 RX ADMIN — NYSTATIN 500000 UNITS: 100000 SUSPENSION ORAL at 17:40

## 2020-12-02 RX ADMIN — HEPARIN SODIUM 5000 UNITS: 5000 INJECTION, SOLUTION INTRAVENOUS; SUBCUTANEOUS at 03:40

## 2020-12-02 RX ADMIN — AZATHIOPRINE 50 MG: 50 TABLET ORAL at 09:09

## 2020-12-02 RX ADMIN — SULFAMETHOXAZOLE AND TRIMETHOPRIM 400 MG: 200; 40 SUSPENSION ORAL at 06:40

## 2020-12-02 RX ADMIN — NYSTATIN 500000 UNITS: 100000 SUSPENSION ORAL at 06:40

## 2020-12-02 RX ADMIN — QUETIAPINE 25 MG: 25 TABLET ORAL at 21:08

## 2020-12-02 RX ADMIN — NYSTATIN 500000 UNITS: 100000 SUSPENSION ORAL at 11:05

## 2020-12-02 RX ADMIN — PANTOPRAZOLE SODIUM 40 MG: 40 INJECTION, POWDER, FOR SOLUTION INTRAVENOUS at 09:09

## 2020-12-02 RX ADMIN — PYRIDOSTIGMINE BROMIDE 30 MG: 60 TABLET ORAL at 09:09

## 2020-12-02 RX ADMIN — PYRIDOSTIGMINE BROMIDE 30 MG: 60 TABLET ORAL at 21:08

## 2020-12-02 RX ADMIN — PIPERACILLIN AND TAZOBACTAM 4.5 G: 4; .5 INJECTION, POWDER, FOR SOLUTION INTRAVENOUS at 15:13

## 2020-12-02 RX ADMIN — PYRIDOSTIGMINE BROMIDE 30 MG: 60 TABLET ORAL at 11:04

## 2020-12-02 RX ADMIN — SULFAMETHOXAZOLE AND TRIMETHOPRIM 400 MG: 200; 40 SUSPENSION ORAL at 21:08

## 2020-12-02 RX ADMIN — PIPERACILLIN AND TAZOBACTAM 4.5 G: 4; .5 INJECTION, POWDER, FOR SOLUTION INTRAVENOUS at 21:08

## 2020-12-02 ASSESSMENT — ACTIVITIES OF DAILY LIVING (ADL)
ADLS_ACUITY_SCORE: 23

## 2020-12-02 NOTE — PLAN OF CARE
Slept about 5 hours tonight. Had to be awakened for pericare as mod amt. Stool leakage around rectal tube. Perirectal area extremely excoriated, red. Cleansed & thin layer of barrier cream applied. Awaiting open bed in LTACH

## 2020-12-02 NOTE — PROGRESS NOTES
Gastroenterology Chart Check    59 yo male with LA grade A reflux esophagitis and duodenal ulcers seen on EGD 11/28 which were superficial and small.  Steroids ok for myasthenia.  Recommend BID PPI if on steroids, needs at least 2 months for ulcers.  After 2 months if steroids done, ok to decrease PPI to once daily indefinitely.    Will sign off.  Please call with questions.    Diane Morrow PA-C  [facility]  [phone]
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
## 2020-12-02 NOTE — PROGRESS NOTES
[facility]    Stroke/Neurocritical Care Progress Note    Interval Events  Neuroexam remains stable.  Overnight, HR in 70s-80's, no more bradycardia noted. H/H stable.     Impression  1. 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 Anti CRMP-5 negative with remainder paraneoplastic antibodies sent on 11/11 returned negative.  Encouraging response to PLEX x5 (11/12-19), methylpred 500 mg IV q12 x10 (11/12-16), and IVIg x4 (11/19-22) with regard to best bulbar and limb exams over the past few days, but delayed respiratory status response necessitated trach. Hopefully will recover quickly enough that he does not need a PEG and can wean off of NG in the next few weeks.    - C/w Prednisone: 60 mg every day -  down-titration at one month from initiation   - C/w Azathioprine 50 mg daily, increase by 50 mg every 2 weeks until goal (175 mg daily)  - C/w pyridostigmine 30mg reduced to Q4 hrs while awake   - Last NIF/VC on 12/1 was -30/1100  - C/w Daily NIFs and VC  - Follow up with Dr. Greenwood of [facility] post-hospital for ongoing management (he may have a [place] or [place] outreach clinic available for this patient from [place])    2. Respiratory paralysis- secondary to #1, following daily inspiratory forces/tidal volumes as tolerated in response to therapy    3. Lung consolidation- s/p 7 day course of unasyn.     4.  Aspiration Pneumonia - bronchoscopy completed 11/25 with moderate thick tan secretions,  "consistent with aspiration of tube feeds.  - Remains afebrile, with now resolved leukocytosis (~10 from ~17-18 on 11/24-11/25).   - Vanc/Zosyn started 11/26 for Aspiration PNA -with subsequent fungal cultures growing pansensitive Enterobacter cloacae/stenotrophomonas m. with antibiotic de-escalation to Zosyn  - Remains on Bactrim for PCP prophylaxis     5. Upper GI bleeding  - Overnight on 11/28 NG suctioned 150 mls medina red blood  - Status post EGD 11/28 that showed erosive esophagitis and nonbleeding gastropathy and duodenal ulcers  - Multiple bowel movements overnight 11/29: stool guaiac test pending.  - He remains on systemic steroids for management of myasthenic crisis.  H&H had slowly drifted down from an admission hemoglobin ~14 to 9.9 on 11/20. Re-evaluated by GI, will c/w steroids as scheduled - appreciate GI recommendations.  - C/w Protonix 40 IV bid until on steroids.     6. Insomnia  - Multifactorial: Systemic steroid use, prolonged ICU stay  - C/w 25mg at bedtime and c/w 5 mg melatonin at 7 PM nightly  - Okay to restrict nighttime fingerstick glucose checks and neuro checks to every 8 hours to allow for adequate sleep.    The Stroke/Neurocritical Care Staff is Dr. Montalvo.     Miguel Hernandez MD  Vascular Neurology Fellow  To page me or covering stroke neurology team member, click here: [email or web address]   Choose \"On Call\" tab at top, then search dropdown box for \"Neurology Adult\", select location, press Enter, then look for stroke/neuro ICU/telestroke.    ______________________________________________________    Medications   Home Meds  Prior to Admission medications    Medication Sig Start Date End Date Taking? Authorizing Provider   amoxicillin-clavulanate (AUGMENTIN) 875-125 MG tablet Take 1 tablet by mouth 2 times daily X 10 days (started 11/9/2020)   Yes Unknown, Entered By History   HYDROcodone-acetaminophen (NORCO) 5-325 MG tablet Take 1-2 tablets by mouth 2 times daily as needed for severe pain    Yes " Unknown, Entered By History   predniSONE (DELTASONE) 20 MG tablet Take 20 mg by mouth 2 times daily X 5 days (started 11/9/2020)   Yes Unknown, Entered By History   tamsulosin (FLOMAX) 0.4 MG capsule Take 0.4 mg by mouth daily   Yes Unknown, Entered By History       Scheduled Meds    [Held by provider] amLODIPine  10 mg Per Feeding Tube Daily     zz extemporaneous template  50 mg Oral or Feeding Tube Daily    Followed by     [START ON 12/8/2020] zz extemporaneous template  100 mg Oral or Feeding Tube Daily    Followed by     [START ON 12/22/2020] zz extemporaneous template  150 mg Oral or Feeding Tube Daily    Followed by     [START ON 1/5/2021] zz extemporaneous template  175 mg Oral or Feeding Tube Daily     chlorhexidine  15 mL Mouth/Throat Q12H     heparin ANTICOAGULANT  5,000 Units Subcutaneous Q8H     insulin aspart  1-6 Units Subcutaneous Q8H     melatonin  5 mg Oral At Bedtime     nystatin  500,000 Units Mouth/Throat 4x Daily     pantoprazole (PROTONIX) IV  40 mg Intravenous BID     piperacillin-tazobactam  4.5 g Intravenous Q6H     predniSONE  60 mg Oral or Feeding Tube Daily    Followed by     [START ON 12/12/2020] predniSONE  50 mg Oral or Feeding Tube Daily     pyridostigmine  30 mg Oral 4x Daily     QUEtiapine  25 mg Oral At Bedtime     sulfamethoxazole-trimethoprim  50 mL Oral or Feeding Tube Q8H       Infusion Meds    sodium chloride 20 mL/hr at 12/02/20 1600       PRN Meds  acetaminophen **OR** acetaminophen, albuterol, artificial tears ophthalmic solution, glucose **OR** dextrose **OR** glucagon, hydrogen peroxide, labetalol, miconazole, miconazole, midazolam, naloxone **OR** naloxone **OR** naloxone **OR** naloxone, ondansetron **OR** ondansetron, prochlorperazine, senna-docusate **OR** senna-docusate       PHYSICAL EXAMINATION  Temp:  [98  F (36.7  C)-98.7  F (37.1  C)] 98.7  F (37.1  C)  Pulse:  [61-83] 80  Resp:  [12-32] 32  BP: ()/(52-80) 126/76  FiO2 (%):  [40 %-60 %] 50 %  SpO2:  [93  %-99 %] 96 %     Date 11/13 11/14 11/15 11/16 11/17 11/23 11/27 12/1     [facility]/NIF -7 -4 -3 -5 -1 -20 -16 -30     Vital Capacity 290 180 240 320 500 930 -- 1100         General:  patient without any acute distress    HEENT:  normocephalic/atraumatic, edentulous, trach present  Cardio:  RRR  Pulmonary: s/p Trach on CPAP  Abdomen:  soft, mild midabdominal tenderness, non-distended  Extremities:  no edema, peripheral pulses palpable  Skin:  intact, warm/dry      Neurologic  Mental Status:  examined on no sedation. Eyes open spontaneously. Follows commands reliably, mouths/gestures/nods/shakes to answer questions, writes with left hand and uses letter board.  Cranial Nerves:  VFF, PERRL, minimal ptosis, conjugate rest gaze, intact horizontal eye movements, face symmetric, weak lip purse  Motor: restless, tapping hands and feet, movements still brisk, elbow flexion 5-/5, elbow extension 4+/5, finger flexion 5/5, finger extension 5/5. Hip flexion 4/5, knee flexion 4+/5, knee extension 4+/5, ankle dorsiflexion 5/5, ankle plantarflexion 5/5. No fasciculations, twitches, or abnormal movements.  Reflexes:  2+ intact and symmetric reflexes in bilateral biceps, brachioradialis, patellae, achilles, toes downgoing  Sensory:  light touch sensation intact and symmetric throughout upper and lower extremities  Coordination:  not tested  Station/Gait:  not tested    Imaging  I personally reviewed all imaging; relevant findings per HPI.     Lab Results Data   CBC  Recent Labs   Lab 12/02/20  0645 12/01/20 0622 11/30/20  0400   WBC 10.9 13.1* 10.9   RBC 3.95* 3.81* 3.32*   HGB 11.8* 11.4* 9.9*   HCT 35.2* 33.7* 29.4*    376 316     Basic Metabolic Panel    Recent Labs   Lab 12/02/20  0645 12/01/20 0622 11/30/20  0130    136 138   POTASSIUM 4.0 3.7 3.6   CHLORIDE 106 105 109   CO2 23 23 23   BUN 18 15 14   CR 0.79 0.87 0.83   * 98 75   JESSICA 8.1* 8.2* 7.7*     Liver Panel  Recent Labs   Lab 12/02/20  0645 12/01/20  0622  11/30/20  0130   PROTTOTAL 6.5* 6.7* 6.1*   ALBUMIN 2.4* 2.5* 2.1*   BILITOTAL 0.2 0.2 0.2   ALKPHOS 151* 138 119   AST 25 31 23   ALT 85* 95* 97*     INR    Recent Labs   Lab Test 11/27/20 2000 11/17/20  0400   INR 1.08 1.20*      Lipid Profile  No lab results found.  A1C    Recent Labs   Lab Test 11/14/20  0402   A1C 6.0*     Troponin I    Recent Labs   Lab 11/30/20  0130   TROPI 0.021

## 2020-12-02 NOTE — PROGRESS NOTES
Patient achieved a NIF of -40 and a FVC of 850. Both done with good pt effort.  12/2/2020  Bhavana Landon, RT

## 2020-12-03 ENCOUNTER — APPOINTMENT (OUTPATIENT)
Dept: OCCUPATIONAL THERAPY | Facility: CLINIC | Age: 60
DRG: 003 | End: 2020-12-03
Attending: INTERNAL MEDICINE
Payer: COMMERCIAL

## 2020-12-03 ENCOUNTER — APPOINTMENT (OUTPATIENT)
Dept: SPEECH THERAPY | Facility: CLINIC | Age: 60
DRG: 003 | End: 2020-12-03
Attending: INTERNAL MEDICINE
Payer: COMMERCIAL

## 2020-12-03 ENCOUNTER — APPOINTMENT (OUTPATIENT)
Dept: PHYSICAL THERAPY | Facility: CLINIC | Age: 60
DRG: 003 | End: 2020-12-03
Attending: INTERNAL MEDICINE
Payer: COMMERCIAL

## 2020-12-03 LAB
ALBUMIN SERPL-MCNC: 2.5 G/DL (ref 3.4–5)
ALP SERPL-CCNC: 136 U/L (ref 40–150)
ALT SERPL W P-5'-P-CCNC: 75 U/L (ref 0–70)
ANION GAP SERPL CALCULATED.3IONS-SCNC: 8 MMOL/L (ref 3–14)
AST SERPL W P-5'-P-CCNC: 21 U/L (ref 0–45)
BASOPHILS # BLD AUTO: 0 10E9/L (ref 0–0.2)
BASOPHILS NFR BLD AUTO: 0.2 %
BILIRUB SERPL-MCNC: 0.3 MG/DL (ref 0.2–1.3)
BUN SERPL-MCNC: 16 MG/DL (ref 7–30)
CALCIUM SERPL-MCNC: 8.1 MG/DL (ref 8.5–10.1)
CHLORIDE SERPL-SCNC: 106 MMOL/L (ref 94–109)
CO2 SERPL-SCNC: 20 MMOL/L (ref 20–32)
CREAT SERPL-MCNC: 0.76 MG/DL (ref 0.66–1.25)
DIFFERENTIAL METHOD BLD: ABNORMAL
EOSINOPHIL # BLD AUTO: 0.2 10E9/L (ref 0–0.7)
EOSINOPHIL NFR BLD AUTO: 1.5 %
ERYTHROCYTE [DISTWIDTH] IN BLOOD BY AUTOMATED COUNT: 15.8 % (ref 10–15)
GFR SERPL CREATININE-BSD FRML MDRD: >90 ML/MIN/{1.73_M2}
GLUCOSE BLDC GLUCOMTR-MCNC: 100 MG/DL (ref 70–99)
GLUCOSE BLDC GLUCOMTR-MCNC: 130 MG/DL (ref 70–99)
GLUCOSE BLDC GLUCOMTR-MCNC: 136 MG/DL (ref 70–99)
GLUCOSE SERPL-MCNC: 133 MG/DL (ref 70–99)
HCT VFR BLD AUTO: 35.4 % (ref 40–53)
HGB BLD-MCNC: 11.8 G/DL (ref 13.3–17.7)
IMM GRANULOCYTES # BLD: 0.3 10E9/L (ref 0–0.4)
IMM GRANULOCYTES NFR BLD: 2.4 %
INTERPRETATION ECG - MUSE: NORMAL
LYMPHOCYTES # BLD AUTO: 1.8 10E9/L (ref 0.8–5.3)
LYMPHOCYTES NFR BLD AUTO: 14.6 %
MAGNESIUM SERPL-MCNC: 2.3 MG/DL (ref 1.6–2.3)
MCH RBC QN AUTO: 29.7 PG (ref 26.5–33)
MCHC RBC AUTO-ENTMCNC: 33.3 G/DL (ref 31.5–36.5)
MCV RBC AUTO: 89 FL (ref 78–100)
MONOCYTES # BLD AUTO: 0.7 10E9/L (ref 0–1.3)
MONOCYTES NFR BLD AUTO: 5.7 %
NEUTROPHILS # BLD AUTO: 9.3 10E9/L (ref 1.6–8.3)
NEUTROPHILS NFR BLD AUTO: 75.6 %
NRBC # BLD AUTO: 0 10*3/UL
NRBC BLD AUTO-RTO: 0 /100
PHOSPHATE SERPL-MCNC: 2.8 MG/DL (ref 2.5–4.5)
PLATELET # BLD AUTO: 430 10E9/L (ref 150–450)
PLATELET # BLD AUTO: 439 10E9/L (ref 150–450)
POTASSIUM SERPL-SCNC: 4.2 MMOL/L (ref 3.4–5.3)
PROT SERPL-MCNC: 6.7 G/DL (ref 6.8–8.8)
RBC # BLD AUTO: 3.97 10E12/L (ref 4.4–5.9)
SODIUM SERPL-SCNC: 134 MMOL/L (ref 133–144)
WBC # BLD AUTO: 12.3 10E9/L (ref 4–11)

## 2020-12-03 PROCEDURE — 250N000011 HC RX IP 250 OP 636: Performed by: INTERNAL MEDICINE

## 2020-12-03 PROCEDURE — 99223 1ST HOSP IP/OBS HIGH 75: CPT | Performed by: PHYSICIAN ASSISTANT

## 2020-12-03 PROCEDURE — 92522 EVALUATE SPEECH PRODUCTION: CPT | Mod: GN

## 2020-12-03 PROCEDURE — 97166 OT EVAL MOD COMPLEX 45 MIN: CPT | Mod: GO

## 2020-12-03 PROCEDURE — 99233 SBSQ HOSP IP/OBS HIGH 50: CPT | Performed by: INTERNAL MEDICINE

## 2020-12-03 PROCEDURE — 85049 AUTOMATED PLATELET COUNT: CPT | Performed by: PHYSICIAN ASSISTANT

## 2020-12-03 PROCEDURE — 250N000012 HC RX MED GY IP 250 OP 636 PS 637: Performed by: STUDENT IN AN ORGANIZED HEALTH CARE EDUCATION/TRAINING PROGRAM

## 2020-12-03 PROCEDURE — 97535 SELF CARE MNGMENT TRAINING: CPT | Mod: GO

## 2020-12-03 PROCEDURE — 250N000013 HC RX MED GY IP 250 OP 250 PS 637: Performed by: PHYSICIAN ASSISTANT

## 2020-12-03 PROCEDURE — 83735 ASSAY OF MAGNESIUM: CPT | Performed by: INTERNAL MEDICINE

## 2020-12-03 PROCEDURE — 250N000011 HC RX IP 250 OP 636: Performed by: PHYSICIAN ASSISTANT

## 2020-12-03 PROCEDURE — 97161 PT EVAL LOW COMPLEX 20 MIN: CPT | Mod: GP | Performed by: PHYSICAL THERAPIST

## 2020-12-03 PROCEDURE — 99232 SBSQ HOSP IP/OBS MODERATE 35: CPT | Mod: GC | Performed by: PSYCHIATRY & NEUROLOGY

## 2020-12-03 PROCEDURE — C9113 INJ PANTOPRAZOLE SODIUM, VIA: HCPCS | Performed by: INTERNAL MEDICINE

## 2020-12-03 PROCEDURE — 80053 COMPREHEN METABOLIC PANEL: CPT | Performed by: INTERNAL MEDICINE

## 2020-12-03 PROCEDURE — 120N000001 HC R&B MED SURG/OB

## 2020-12-03 PROCEDURE — 84100 ASSAY OF PHOSPHORUS: CPT | Performed by: INTERNAL MEDICINE

## 2020-12-03 PROCEDURE — 250N000013 HC RX MED GY IP 250 OP 250 PS 637: Performed by: INTERNAL MEDICINE

## 2020-12-03 PROCEDURE — 999N000157 HC STATISTIC RCP TIME EA 10 MIN

## 2020-12-03 PROCEDURE — 92507 TX SP LANG VOICE COMM INDIV: CPT | Mod: GN

## 2020-12-03 PROCEDURE — 36415 COLL VENOUS BLD VENIPUNCTURE: CPT | Performed by: PHYSICIAN ASSISTANT

## 2020-12-03 PROCEDURE — C9113 INJ PANTOPRAZOLE SODIUM, VIA: HCPCS | Performed by: PHYSICIAN ASSISTANT

## 2020-12-03 PROCEDURE — 999N001017 HC STATISTIC GLUCOSE BY METER IP

## 2020-12-03 PROCEDURE — 272N000078 HC NUTRITION PRODUCT INTERMEDIATE LITER

## 2020-12-03 PROCEDURE — 97530 THERAPEUTIC ACTIVITIES: CPT | Mod: GP | Performed by: PHYSICAL THERAPIST

## 2020-12-03 PROCEDURE — 999N000215 HC STATISTIC HFNC ADULT NON-CPAP

## 2020-12-03 PROCEDURE — 120N000013 HC R&B IMCU

## 2020-12-03 PROCEDURE — G0463 HOSPITAL OUTPT CLINIC VISIT: HCPCS

## 2020-12-03 PROCEDURE — 250N000009 HC RX 250: Performed by: STUDENT IN AN ORGANIZED HEALTH CARE EDUCATION/TRAINING PROGRAM

## 2020-12-03 PROCEDURE — 250N000013 HC RX MED GY IP 250 OP 250 PS 637: Performed by: STUDENT IN AN ORGANIZED HEALTH CARE EDUCATION/TRAINING PROGRAM

## 2020-12-03 PROCEDURE — 85025 COMPLETE CBC W/AUTO DIFF WBC: CPT | Performed by: INTERNAL MEDICINE

## 2020-12-03 RX ORDER — NITROGLYCERIN 0.4 MG/1
0.4 TABLET SUBLINGUAL EVERY 5 MIN PRN
Status: DISCONTINUED | OUTPATIENT
Start: 2020-12-03 | End: 2020-12-05 | Stop reason: HOSPADM

## 2020-12-03 RX ORDER — NYSTATIN AND TRIAMCINOLONE ACETONIDE 100000; 1 [USP'U]/G; MG/G
OINTMENT TOPICAL 2 TIMES DAILY
Status: DISCONTINUED | OUTPATIENT
Start: 2020-12-03 | End: 2020-12-05 | Stop reason: HOSPADM

## 2020-12-03 RX ORDER — CARBOXYMETHYLCELLULOSE SODIUM 5 MG/ML
1 SOLUTION/ DROPS OPHTHALMIC
Status: DISCONTINUED | OUTPATIENT
Start: 2020-12-03 | End: 2020-12-03

## 2020-12-03 RX ADMIN — NYSTATIN 500000 UNITS: 100000 SUSPENSION ORAL at 11:39

## 2020-12-03 RX ADMIN — PYRIDOSTIGMINE BROMIDE 30 MG: 60 TABLET ORAL at 20:22

## 2020-12-03 RX ADMIN — HEPARIN SODIUM 5000 UNITS: 5000 INJECTION, SOLUTION INTRAVENOUS; SUBCUTANEOUS at 20:22

## 2020-12-03 RX ADMIN — PIPERACILLIN AND TAZOBACTAM 4.5 G: 4; .5 INJECTION, POWDER, FOR SOLUTION INTRAVENOUS at 09:15

## 2020-12-03 RX ADMIN — MELATONIN 5 MG TABLET 5 MG: at 20:22

## 2020-12-03 RX ADMIN — PYRIDOSTIGMINE BROMIDE 30 MG: 60 TABLET ORAL at 15:26

## 2020-12-03 RX ADMIN — PROCHLORPERAZINE EDISYLATE 5 MG: 5 INJECTION INTRAMUSCULAR; INTRAVENOUS at 03:08

## 2020-12-03 RX ADMIN — NYSTATIN 500000 UNITS: 100000 SUSPENSION ORAL at 18:38

## 2020-12-03 RX ADMIN — SULFAMETHOXAZOLE AND TRIMETHOPRIM 400 MG: 200; 40 SUSPENSION ORAL at 13:31

## 2020-12-03 RX ADMIN — PANTOPRAZOLE SODIUM 40 MG: 40 INJECTION, POWDER, FOR SOLUTION INTRAVENOUS at 20:22

## 2020-12-03 RX ADMIN — NYSTATIN 500000 UNITS: 100000 SUSPENSION ORAL at 23:55

## 2020-12-03 RX ADMIN — HEPARIN SODIUM 5000 UNITS: 5000 INJECTION, SOLUTION INTRAVENOUS; SUBCUTANEOUS at 11:40

## 2020-12-03 RX ADMIN — SULFAMETHOXAZOLE AND TRIMETHOPRIM 400 MG: 200; 40 SUSPENSION ORAL at 06:10

## 2020-12-03 RX ADMIN — NYSTATIN AND TRIAMCINOLONE ACETONIDE: 100000; 1 OINTMENT TOPICAL at 23:22

## 2020-12-03 RX ADMIN — SULFAMETHOXAZOLE AND TRIMETHOPRIM 400 MG: 200; 40 SUSPENSION ORAL at 23:21

## 2020-12-03 RX ADMIN — PANTOPRAZOLE SODIUM 40 MG: 40 INJECTION, POWDER, FOR SOLUTION INTRAVENOUS at 08:40

## 2020-12-03 RX ADMIN — PYRIDOSTIGMINE BROMIDE 30 MG: 60 TABLET ORAL at 08:39

## 2020-12-03 RX ADMIN — PIPERACILLIN AND TAZOBACTAM 4.5 G: 4; .5 INJECTION, POWDER, FOR SOLUTION INTRAVENOUS at 03:07

## 2020-12-03 RX ADMIN — AZATHIOPRINE 50 MG: 50 TABLET ORAL at 09:43

## 2020-12-03 RX ADMIN — QUETIAPINE 25 MG: 25 TABLET ORAL at 20:23

## 2020-12-03 RX ADMIN — HEPARIN SODIUM 5000 UNITS: 5000 INJECTION, SOLUTION INTRAVENOUS; SUBCUTANEOUS at 03:07

## 2020-12-03 RX ADMIN — CHLORHEXIDINE GLUCONATE 0.12% ORAL RINSE 15 ML: 1.2 LIQUID ORAL at 08:39

## 2020-12-03 RX ADMIN — PREDNISONE 60 MG: 20 TABLET ORAL at 08:39

## 2020-12-03 RX ADMIN — NYSTATIN 500000 UNITS: 100000 SUSPENSION ORAL at 06:10

## 2020-12-03 RX ADMIN — ACETAMINOPHEN 650 MG: 325 TABLET, FILM COATED ORAL at 08:39

## 2020-12-03 RX ADMIN — PYRIDOSTIGMINE BROMIDE 30 MG: 60 TABLET ORAL at 11:40

## 2020-12-03 RX ADMIN — PIPERACILLIN AND TAZOBACTAM 4.5 G: 4; .5 INJECTION, POWDER, FOR SOLUTION INTRAVENOUS at 22:16

## 2020-12-03 RX ADMIN — PIPERACILLIN AND TAZOBACTAM 4.5 G: 4; .5 INJECTION, POWDER, FOR SOLUTION INTRAVENOUS at 15:25

## 2020-12-03 ASSESSMENT — ACTIVITIES OF DAILY LIVING (ADL)
ADLS_ACUITY_SCORE: 22
PREVIOUS_RESPONSIBILITIES: MEAL PREP;HOUSEKEEPING;LAUNDRY;SHOPPING;MEDICATION MANAGEMENT
ADLS_ACUITY_SCORE: 23
ADLS_ACUITY_SCORE: 22
ADLS_ACUITY_SCORE: 23
ADLS_ACUITY_SCORE: 23
ADLS_ACUITY_SCORE: 22
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
## 2020-12-03 NOTE — PROGRESS NOTES
Pt worked with ST and RT with speaking valve. Pt was able to ask for his dentures to be brought by his sister Sherie (phone call was made requesting these be brought here or be brought to [facility] if transferred). Pt was also able to talk to his daughter for a few minutes over the phone using his speaking valve. Pt became tearful and emotional in response to this step in his recovery. Pt was asked whether spiritual health would be something he was interested in and declined for this time. Pt was comforted and all questions were answered as able.

## 2020-12-03 NOTE — PROGRESS NOTES
Pt refused the BiPAP for the night and MD was informed. Pt remains on trach dome HF 40 lpm and 60%, SpO2 96%. Will continue to monitor the pt.     RT Daryl.
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
## 2020-12-03 NOTE — DISCHARGE SUMMARY
Physician Discharge Summary     Patient ID:  Roosevelt Burris  [id number]  60 year old  1960    Admit date: 11/10/2020    Discharge date and time: 12/5/2020     Admitting Physician: Kaiden Queen MD     Discharge Physician: Mio Tovar DO    Admission Diagnoses: respiratory failure  Acute hypoxemic respiratory failure (H)    Discharge Diagnoses: Same as admission diagnoses, also myasthenia gravis, stenotrophomonas pneumonia, upper GI bleeding from duodenal ulcers. severe malnutrition.          Admission Condition: critical    Discharged Condition: stable    Indication for Admission: see admission/discharge diagnoses    Hospital Course:   Mr. Burris is a 61 yo M with pmh of tbi with L facial droop, L weakness and dysarthria at baseline who now presented with weight loss, and progressive weakness.  He also had dyspnea while at an outside hospital and was intubated for respiratory failure prior to arrival.  Here, workup was positive for acetylcholine esterase antibody and EMG findings c/w new diagnosis of myasthenia gravis. He was treated for this with plasma exchange and high dose solumedrol as well as a partial course of ivig (last of 5 doses held due to possible allergic reaction).  He was then started an azathioprine up-taper (50 mg daily until 12/8, then 100mg daily until 12/22 then 150 mg daily until 1/5/21, then 175 mg daily), a prednisone taper (60 mg daily until 12/12, then 50 mg daily) and pyridostigmine PO.  His course was also complicated by multiple pneumonias, believed due to aspiration, and he eventually required tracheostomy on 11/19.  Most recently he developed stenotrophomonas pneumonia for which he was started on bactrim on 11/27.  Once his treatment course of bactrim is finished he should be started on prophylactic dose bactrim as long as he is on immunomodulating therapies.  By the time of discharge he was tolerating trach dome 60% at 35 lpm. Encouraged to use bipap at night but not  needed.  He developed mild GI bleeding the weekend of 11/28 and underwent upper endoscopy which showed portal gastropathy and a single duodenal ulcer.  H pylori testing was negative.  He was continued on bid protonix for this and it should be continued at this regimen for at least 2 months. Following this it can be tapered to daily dosing to continue indefinitely.  For his weight loss prior to arrival, this was believed due to poor PO intake due to worsening difficulty swallowing.  Malignancy workup including CT scanning and testing for tumor markers was unrevealing.    Consults: GI, neurology, PT/OT/speech, thoracic surgery (trach placement), general surgery (peg placement)    Significant Diagnostic Studies: see above    Treatments: see above    Discharge Exam:  Constitutional: Appears stated age, no acute distress. Trach site and surrounding bandages clean/dry.  Respiratory: Breath sounds CTA. No increased work of breathing.  Cardiovascular: RRR, no rub or murmur. No peripheral edema.  GI: Soft, non-tender, non-distended.  : Weston catheter in with clear yellow urine.  Skin: Warm, dry, no rashes or lesions.    Disposition: LTACH    Patient Instructions:   Current Discharge Medication List      CONTINUE these medications which have NOT CHANGED    Details   HYDROcodone-acetaminophen (NORCO) 5-325 MG tablet Take 1-2 tablets by mouth 2 times daily as needed for severe pain       tamsulosin (FLOMAX) 0.4 MG capsule Take 0.4 mg by mouth daily      acetaminophen (TYLENOL) 325 MG tablet Take 325-650 mg by mouth every 4 hours as needed for mild pain      aspirin 81 MG EC tablet Take 81 mg by mouth daily Rx for once daily; however, pt gets OTC, so can't confirm adherence           Activity: activity as tolerated  Diet: NPO with tube feeds  Wound Care: as outlined    Follow-up with neurology in 1 month for further myasthenia treatment. Follow-up with [facility] in 1 month for further treatment of duodenal  ulcerations    Signed:  Mio Toavr DO  Hospitalist [facility]  Pager: [phone]

## 2020-12-03 NOTE — PROGRESS NOTES
PT had PMV trial with Speech Therapist. Pt tolerated well and no issues. Pt was suctioned before and after the PMV was placed.   Cuff is inflated again and placed on Trach Dome.     RT will continue to follow.  Day Moore, RT

## 2020-12-03 NOTE — PROGRESS NOTES
Care Management Follow Up    Length of Stay (days): 23    Expected Discharge Date: 12/04/20([facility])  Expected Time of Departure: Date/time unknown  Concerns to be Addressed: discharge planning;adjustment to diagnosis/illness     Patient plan of care discussed at interdisciplinary rounds: Yes    Anticipated Discharge Disposition: LTACH  Disposition Comments: Discharge plan remains unknown at this time; Pt resides in a [facility] in [place], [place] that family is hopeful he can return to at some point.  Anticipated Discharge Services: Transportation Services  Anticipated Discharge DME: Oxygen    Patient/family educated on Medicare website which has current facility and service quality ratings: yes  Education Provided on the Discharge Plan:    Patient/Family in Agreement with the Plan: other (see comments)    Referrals Placed by CM/SW: Transportation(anticipate [facility] when bed available)  Private pay costs discussed:     Additional Information:    Writer angel pardo/ Rodrigo stern @ [facility]  He anticipates a bed open tomorrow 12/4  [facility]- no bed anticipated this week
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
## 2020-12-03 NOTE — PROGRESS NOTES
All belongings sent with pt to room 320. Report given to LATASHA Ramos. VSS and no concerns regarding transfer. Daughter Piper updated via phone. Room number and department phone number given to Piper. All questions answered as able.

## 2020-12-03 NOTE — PLAN OF CARE
SLP Brief note - Pt seen for PMSV/communication evaluation. He was since with RT and RN present. Increased secretions noted with progression of trial, tracheal suctionoing completed x 1 during the 20 mintue PMSV trial. The PMSV was removed after an additional 5 minutes (25 minutes total) d/t increase in tracheal secretions, cuff was re inflated.     Recommend PMSV with SLP or RT ONLY at this time d/t pt's limited tolerance secondary to secretion management.
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
## 2020-12-03 NOTE — DISCHARGE INSTRUCTIONS
Groin perineal, perianal, gluteal fold and buttocks- Clean with incontinence cleanser, apply antifungal ointment  BID.  Apply Moisture barrier ointment or paste (e.g. Criticaid) PRN after fecal incontinent episodes    Pressure Injury prevention   Turn every 2 hours, side to side avoid supine on low air loss  Float heels off bed with use of pillows under legs, if ineffective, order offloading boots: (e.g. Prevalon Heel Lift boots)  Prevent sliding by limiting HOB to 30 degrees or less unless contraindicated, use knee gatch first if not contraindicated  Chair cushion with hourly chair pushups or offloads (if unable to shift weight, please limit sitting to an hour at a time)

## 2020-12-03 NOTE — PROGRESS NOTES
[facility]    Stroke/Neurocritical Care Progress Note    Interval Events  Neuroexam remains stable. H/H stable.     Impression  1. 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 Anti CRMP-5 negative with remainder paraneoplastic antibodies sent on 11/11 returned negative.  Encouraging response to PLEX x5 (11/12-19), methylpred 500 mg IV q12 x10 (11/12-16), and IVIg x4 (11/19-22) with regard to best bulbar and limb exams over the past few days, but delayed respiratory status response necessitated trach. Hopefully will recover quickly enough that he does not need a PEG and can wean off of NG in the next few weeks.    - C/w Prednisone: 60 mg every day -  down-titration at one month from initiation   - C/w Azathioprine 50 mg daily, increase by 50 mg every 2 weeks until goal (175 mg daily)  - C/w pyridostigmine 30mg reduced to Q4 hrs while awake   - Last NIF/VC on 12/3 was -40/1200  - C/w Daily NIFs and VC  - Follow up with Dr. Greenwood of [facility] post-hospital for ongoing management (he may have a [place] or [place] outreach clinic available for this patient from [place])    2. Respiratory paralysis- secondary to #1, following daily inspiratory forces/tidal volumes as tolerated in response to therapy    3. Lung consolidation- s/p 7 day course of unasyn.     4.  Aspiration Pneumonia - bronchoscopy completed 11/25 with moderate thick tan secretions, consistent with aspiration of tube feeds.  - Remains  "afebrile, with now resolved leukocytosis (~10 from ~17-18 on 11/24-11/25).   - Vanc/Zosyn started 11/26 for Aspiration PNA -with subsequent fungal cultures growing pansensitive Enterobacter cloacae/stenotrophomonas m. with antibiotic de-escalation to Zosyn  - Remains on Bactrim for PCP prophylaxis     5. Upper GI bleeding  - Overnight on 11/28 NG suctioned 150 mls medina red blood  - Status post EGD 11/28 that showed erosive esophagitis and nonbleeding gastropathy and duodenal ulcers  - Multiple bowel movements overnight 11/29: stool guaiac test pending.  - He remains on systemic steroids for management of myasthenic crisis.  H&H had slowly drifted down from an admission hemoglobin ~14 to 9.9 on 11/20. Re-evaluated by GI, will c/w steroids as scheduled - appreciate GI recommendations.  - C/w Protonix 40 IV bid until on steroids.     6. Insomnia  - Multifactorial: Systemic steroid use, prolonged ICU stay  - C/w 25mg at bedtime and c/w 5 mg melatonin at 7 PM nightly  - Okay to restrict nighttime fingerstick glucose checks and neuro checks to every 8 hours to allow for adequate sleep.    The Stroke/Neurocritical Care Staff is Dr. Montalvo.     Miguel Hernandez MD  Vascular Neurology Fellow  To page me or covering stroke neurology team member, click here: [facility]   Choose \"On Call\" tab at top, then search dropdown box for \"Neurology Adult\", select location, press Enter, then look for stroke/neuro ICU/telestroke.    ______________________________________________________    Medications   Home Meds  Prior to Admission medications    Medication Sig Start Date End Date Taking? Authorizing Provider   amoxicillin-clavulanate (AUGMENTIN) 875-125 MG tablet Take 1 tablet by mouth 2 times daily X 10 days (started 11/9/2020)   Yes Unknown, Entered By History   HYDROcodone-acetaminophen (NORCO) 5-325 MG tablet Take 1-2 tablets by mouth 2 times daily as needed for severe pain    Yes Unknown, Entered By History   predniSONE (DELTASONE) 20 MG " tablet Take 20 mg by mouth 2 times daily X 5 days (started 11/9/2020)   Yes Unknown, Entered By History   tamsulosin (FLOMAX) 0.4 MG capsule Take 0.4 mg by mouth daily   Yes Unknown, Entered By History       Scheduled Meds    [Held by provider] amLODIPine  10 mg Per Feeding Tube Daily     zz extemporaneous template  50 mg Oral or Feeding Tube Daily    Followed by     [START ON 12/8/2020] zz extemporaneous template  100 mg Oral or Feeding Tube Daily    Followed by     [START ON 12/22/2020] zz extemporaneous template  150 mg Oral or Feeding Tube Daily    Followed by     [START ON 1/5/2021] zz extemporaneous template  175 mg Oral or Feeding Tube Daily     chlorhexidine  15 mL Mouth/Throat Q12H     heparin ANTICOAGULANT  5,000 Units Subcutaneous Q8H     insulin aspart  1-6 Units Subcutaneous Q8H     melatonin  5 mg Oral At Bedtime     nystatin  500,000 Units Mouth/Throat 4x Daily     nystatin-triamcinolone   Topical BID     pantoprazole (PROTONIX) IV  40 mg Intravenous BID     piperacillin-tazobactam  4.5 g Intravenous Q6H     predniSONE  60 mg Oral or Feeding Tube Daily    Followed by     [START ON 12/12/2020] predniSONE  50 mg Oral or Feeding Tube Daily     pyridostigmine  30 mg Oral 4x Daily     QUEtiapine  25 mg Oral At Bedtime     sulfamethoxazole-trimethoprim  50 mL Oral or Feeding Tube Q8H       Infusion Meds    - MEDICATION INSTRUCTIONS -       - MEDICATION INSTRUCTIONS -       sodium chloride Stopped (12/03/20 1800)       PRN Meds  acetaminophen **OR** acetaminophen, albuterol, artificial tears ophthalmic solution, glucose **OR** dextrose **OR** glucagon, hydrogen peroxide, labetalol, miconazole, miconazole, midazolam, naloxone **OR** naloxone **OR** naloxone **OR** naloxone, nitroGLYcerin, - MEDICATION INSTRUCTIONS -, ondansetron **OR** ondansetron, - MEDICATION INSTRUCTIONS -, prochlorperazine, senna-docusate **OR** senna-docusate       PHYSICAL EXAMINATION  Temp:  [98.2  F (36.8  C)-98.7  F (37.1  C)] 98.5   F (36.9  C)  Pulse:  [] 89  Resp:  [13-31] 27  BP: (104-132)/(67-86) 124/82  FiO2 (%):  [60 %] 60 %  SpO2:  [88 %-99 %] 97 %     Date 11/13 11/14 11/15 11/16 11/17 11/23 11/27 12/1 12/3    MIP/NIF -7 -4 -3 -5 -1 -20 -16 -30 -40    Vital Capacity 290 180 240 320 500 930 -- 1100 1200        General:  patient without any acute distress    HEENT:  normocephalic/atraumatic, edentulous, trach present  Cardio:  RRR  Pulmonary: s/p Trach on CPAP  Abdomen:  soft, mild midabdominal tenderness, non-distended  Extremities:  no edema, peripheral pulses palpable  Skin:  intact, warm/dry      Neurologic  Mental Status:  examined on no sedation. Eyes open spontaneously. Follows commands reliably, mouths/gestures/nods/shakes to answer questions, writes with left hand and uses letter board.  Cranial Nerves:  VFF, PERRL, minimal ptosis, conjugate rest gaze, intact horizontal eye movements, face symmetric, weak lip purse  Motor: restless, tapping hands and feet, movements still brisk, elbow flexion 5-/5, elbow extension 4+/5, finger flexion 5/5, finger extension 5/5. Hip flexion 4/5, knee flexion 4+/5, knee extension 4+/5, ankle dorsiflexion 5/5, ankle plantarflexion 5/5. No fasciculations, twitches, or abnormal movements.  Reflexes:  2+ intact and symmetric reflexes in bilateral biceps, brachioradialis, patellae, achilles, toes downgoing  Sensory:  light touch sensation intact and symmetric throughout upper and lower extremities  Coordination:  not tested  Station/Gait:  not tested    Imaging  I personally reviewed all imaging; relevant findings per HPI.     Lab Results Data   CBC  Recent Labs   Lab 12/03/20  0726 12/03/20  0450 12/02/20  0645 12/01/20  0622   WBC 12.3*  --  10.9 13.1*   RBC 3.97*  --  3.95* 3.81*   HGB 11.8*  --  11.8* 11.4*   HCT 35.4*  --  35.2* 33.7*    430 413 376     Basic Metabolic Panel    Recent Labs   Lab 12/03/20  0726 12/02/20  0645 12/01/20  0622    136 136   POTASSIUM 4.2 4.0 3.7    CHLORIDE 106 106 105   CO2 20 23 23   BUN 16 18 15   CR 0.76 0.79 0.87   * 142* 98   JESSICA 8.1* 8.1* 8.2*     Liver Panel  Recent Labs   Lab 12/03/20  0726 12/02/20  0645 12/01/20  0622   PROTTOTAL 6.7* 6.5* 6.7*   ALBUMIN 2.5* 2.4* 2.5*   BILITOTAL 0.3 0.2 0.2   ALKPHOS 136 151* 138   AST 21 25 31   ALT 75* 85* 95*     INR    Recent Labs   Lab Test 11/27/20 2000 11/17/20  0400   INR 1.08 1.20*      Lipid Profile  No lab results found.  A1C    Recent Labs   Lab Test 11/14/20  0402   A1C 6.0*     Troponin I    Recent Labs   Lab 11/30/20  0130   TROPI 0.021

## 2020-12-03 NOTE — PLAN OF CARE
Shift Summary    Neuro: A&O  Cardiac: SR  Pulmonary: Trach dome. Frequent suctioning.   GI/: Rectal tube with leakage. Weston with good UO.  Skin: Excoriated tori area  Awaiting Bed at LTACH
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
## 2020-12-03 NOTE — PROGRESS NOTES
CLINICAL NUTRITION SERVICES - REASSESSMENT NOTE      Malnutrition: (11/24)  % Weight Loss:  > 5% in 1 month (severe malnutrition)  % Intake:  </= 75% for >/= 1 month (severe malnutrition)  Subcutaneous Fat Loss:  Orbital region mild depletion  Muscle Loss:  Temporal region moderate depletion, Clavicle bone region mild depletion and Acromion bone region mild depletion  Fluid Retention:  Mild      Malnutrition Diagnosis: Severe malnutrition  In Context of:  Acute illness or injury  Chronic illness or disease       EVALUATION OF PROGRESS TOWARD GOALS   Diet:  NPO with Trach    Nutrition Support:  Patient began advancing TF on 12/1, achieved goal on 12/2, and continues as follows ~    Nutrition Support Enteral:  Type of Feeding Tube: Nasoduodenal   Enteral Frequency:  Continuous  Enteral Regimen: Isosource 1.5 at 65 mL/hr  Total Enteral Provisions: 2340 kcal (29 kcal/kg), 106 g protein (1.3 g/kg), 275 g CHO, 23 g fiber, 1186 mL H2O  Free Water Flush: 60 mL every 4 hours     Intake/Tolerance:    K/Mg/Phos all WNL  -176 on Medium sliding scale insulin, Prednisone   Stool 50 mL   I/O 2580/1670, wt 74.1 kg (12/1) - Down 1.2 kg from 11/30      ASSESSED NUTRITION NEEDS:  Dosing Weight: 75.3 kg - 11/30  Estimated Energy Needs: 6270-4112 kcals (25-30 Kcal/Kg)  Justification: maintenance  Estimated Protein Needs:  grams protein (1.2-1.5 g pro/Kg)  Justification: repletion      NEW FINDINGS:   Plan for transfer to floor today, noted possible LTACH 12/4    Previous Goals (11/30):   EN tolerance at 20 mL/hr, ability to advance further within 48 hrs   Evaluation: Met    Previous Nutrition Diagnosis (11/30):   Inadequate protein-energy intake related to NPO, vented and TF reliant for nutrition as evidenced by TF running below goal rate for 5 days, meeting <25% estimated needs   Evaluation: Resolved       CURRENT NUTRITION DIAGNOSIS  No nutrition diagnosis identified at this time     INTERVENTIONS  Recommendations /  Nutrition Prescription  Continue Isosource 1.5 at 65 mL/hr as above     Implementation  Collaboration and Referral of Nutrition care:  Patient discussed today during interdisciplinary bedside rounds     Goals  Isosource 1.5 at 65 mL/hr will continue to meet % needs    MONITORING AND EVALUATION:  Progress towards goals will be monitored and evaluated per protocol and Practice Guidelines    Tanisha Echols RD, LD, CNSC   Clinical Dietitian - [facility]

## 2020-12-04 ENCOUNTER — APPOINTMENT (OUTPATIENT)
Dept: SPEECH THERAPY | Facility: CLINIC | Age: 60
DRG: 003 | End: 2020-12-04
Attending: INTERNAL MEDICINE
Payer: COMMERCIAL

## 2020-12-04 ENCOUNTER — APPOINTMENT (OUTPATIENT)
Dept: OCCUPATIONAL THERAPY | Facility: CLINIC | Age: 60
DRG: 003 | End: 2020-12-04
Attending: INTERNAL MEDICINE
Payer: COMMERCIAL

## 2020-12-04 ENCOUNTER — APPOINTMENT (OUTPATIENT)
Dept: PHYSICAL THERAPY | Facility: CLINIC | Age: 60
DRG: 003 | End: 2020-12-04
Attending: INTERNAL MEDICINE
Payer: COMMERCIAL

## 2020-12-04 LAB
ALBUMIN SERPL-MCNC: 2.6 G/DL (ref 3.4–5)
ALP SERPL-CCNC: 135 U/L (ref 40–150)
ALT SERPL W P-5'-P-CCNC: 69 U/L (ref 0–70)
ANION GAP SERPL CALCULATED.3IONS-SCNC: 6 MMOL/L (ref 3–14)
AST SERPL W P-5'-P-CCNC: 19 U/L (ref 0–45)
BILIRUB SERPL-MCNC: 0.2 MG/DL (ref 0.2–1.3)
BUN SERPL-MCNC: 19 MG/DL (ref 7–30)
CALCIUM SERPL-MCNC: 8.5 MG/DL (ref 8.5–10.1)
CHLORIDE SERPL-SCNC: 105 MMOL/L (ref 94–109)
CO2 SERPL-SCNC: 23 MMOL/L (ref 20–32)
CREAT SERPL-MCNC: 0.82 MG/DL (ref 0.66–1.25)
ERYTHROCYTE [DISTWIDTH] IN BLOOD BY AUTOMATED COUNT: 16.1 % (ref 10–15)
GFR SERPL CREATININE-BSD FRML MDRD: >90 ML/MIN/{1.73_M2}
GLUCOSE BLDC GLUCOMTR-MCNC: 108 MG/DL (ref 70–99)
GLUCOSE BLDC GLUCOMTR-MCNC: 139 MG/DL (ref 70–99)
GLUCOSE BLDC GLUCOMTR-MCNC: 97 MG/DL (ref 70–99)
GLUCOSE SERPL-MCNC: 116 MG/DL (ref 70–99)
HCT VFR BLD AUTO: 36.5 % (ref 40–53)
HGB BLD-MCNC: 12.2 G/DL (ref 13.3–17.7)
MCH RBC QN AUTO: 29.9 PG (ref 26.5–33)
MCHC RBC AUTO-ENTMCNC: 33.4 G/DL (ref 31.5–36.5)
MCV RBC AUTO: 90 FL (ref 78–100)
PLATELET # BLD AUTO: 434 10E9/L (ref 150–450)
POTASSIUM SERPL-SCNC: 4.1 MMOL/L (ref 3.4–5.3)
PROT SERPL-MCNC: 6.9 G/DL (ref 6.8–8.8)
RBC # BLD AUTO: 4.08 10E12/L (ref 4.4–5.9)
SODIUM SERPL-SCNC: 134 MMOL/L (ref 133–144)
WBC # BLD AUTO: 9.7 10E9/L (ref 4–11)

## 2020-12-04 PROCEDURE — 250N000013 HC RX MED GY IP 250 OP 250 PS 637: Performed by: PHYSICIAN ASSISTANT

## 2020-12-04 PROCEDURE — 99232 SBSQ HOSP IP/OBS MODERATE 35: CPT | Mod: GC | Performed by: PSYCHIATRY & NEUROLOGY

## 2020-12-04 PROCEDURE — 250N000011 HC RX IP 250 OP 636: Performed by: PHYSICIAN ASSISTANT

## 2020-12-04 PROCEDURE — 94150 VITAL CAPACITY TEST: CPT

## 2020-12-04 PROCEDURE — 250N000013 HC RX MED GY IP 250 OP 250 PS 637: Performed by: STUDENT IN AN ORGANIZED HEALTH CARE EDUCATION/TRAINING PROGRAM

## 2020-12-04 PROCEDURE — 99207 PR APP CREDIT; MD BILLING SHARED VISIT: CPT | Performed by: PHYSICIAN ASSISTANT

## 2020-12-04 PROCEDURE — 999N001017 HC STATISTIC GLUCOSE BY METER IP

## 2020-12-04 PROCEDURE — 120N000013 HC R&B IMCU

## 2020-12-04 PROCEDURE — 97110 THERAPEUTIC EXERCISES: CPT | Mod: GO

## 2020-12-04 PROCEDURE — 85027 COMPLETE CBC AUTOMATED: CPT | Performed by: PHYSICIAN ASSISTANT

## 2020-12-04 PROCEDURE — 97530 THERAPEUTIC ACTIVITIES: CPT | Mod: GP | Performed by: PHYSICAL THERAPIST

## 2020-12-04 PROCEDURE — 92507 TX SP LANG VOICE COMM INDIV: CPT | Mod: GN | Performed by: SPEECH-LANGUAGE PATHOLOGIST

## 2020-12-04 PROCEDURE — 250N000009 HC RX 250: Performed by: PHYSICIAN ASSISTANT

## 2020-12-04 PROCEDURE — 36415 COLL VENOUS BLD VENIPUNCTURE: CPT | Performed by: PHYSICIAN ASSISTANT

## 2020-12-04 PROCEDURE — C9113 INJ PANTOPRAZOLE SODIUM, VIA: HCPCS | Performed by: PHYSICIAN ASSISTANT

## 2020-12-04 PROCEDURE — 80053 COMPREHEN METABOLIC PANEL: CPT | Performed by: PHYSICIAN ASSISTANT

## 2020-12-04 PROCEDURE — 97110 THERAPEUTIC EXERCISES: CPT | Mod: GP | Performed by: PHYSICAL THERAPIST

## 2020-12-04 PROCEDURE — 99232 SBSQ HOSP IP/OBS MODERATE 35: CPT | Performed by: INTERNAL MEDICINE

## 2020-12-04 PROCEDURE — 999N000157 HC STATISTIC RCP TIME EA 10 MIN

## 2020-12-04 PROCEDURE — 999N000009 HC STATISTIC AIRWAY CARE

## 2020-12-04 PROCEDURE — 250N000012 HC RX MED GY IP 250 OP 636 PS 637: Performed by: PHYSICIAN ASSISTANT

## 2020-12-04 PROCEDURE — 272N000078 HC NUTRITION PRODUCT INTERMEDIATE LITER

## 2020-12-04 RX ORDER — SULFAMETHOXAZOLE AND TRIMETHOPRIM 200; 40 MG/5ML; MG/5ML
50 SUSPENSION ORAL EVERY 8 HOURS
Status: DISCONTINUED | OUTPATIENT
Start: 2020-12-04 | End: 2020-12-05 | Stop reason: HOSPADM

## 2020-12-04 RX ADMIN — PIPERACILLIN AND TAZOBACTAM 4.5 G: 4; .5 INJECTION, POWDER, FOR SOLUTION INTRAVENOUS at 09:46

## 2020-12-04 RX ADMIN — HEPARIN SODIUM 5000 UNITS: 5000 INJECTION, SOLUTION INTRAVENOUS; SUBCUTANEOUS at 04:14

## 2020-12-04 RX ADMIN — PIPERACILLIN AND TAZOBACTAM 4.5 G: 4; .5 INJECTION, POWDER, FOR SOLUTION INTRAVENOUS at 22:21

## 2020-12-04 RX ADMIN — CHLORHEXIDINE GLUCONATE 0.12% ORAL RINSE 15 ML: 1.2 LIQUID ORAL at 09:46

## 2020-12-04 RX ADMIN — SULFAMETHOXAZOLE AND TRIMETHOPRIM 400 MG: 200; 40 SUSPENSION ORAL at 15:40

## 2020-12-04 RX ADMIN — PANTOPRAZOLE SODIUM 40 MG: 40 INJECTION, POWDER, FOR SOLUTION INTRAVENOUS at 20:12

## 2020-12-04 RX ADMIN — PREDNISONE 60 MG: 20 TABLET ORAL at 08:45

## 2020-12-04 RX ADMIN — HEPARIN SODIUM 5000 UNITS: 5000 INJECTION, SOLUTION INTRAVENOUS; SUBCUTANEOUS at 20:13

## 2020-12-04 RX ADMIN — PYRIDOSTIGMINE BROMIDE 30 MG: 60 TABLET ORAL at 12:16

## 2020-12-04 RX ADMIN — NYSTATIN 500000 UNITS: 100000 SUSPENSION ORAL at 17:28

## 2020-12-04 RX ADMIN — AZATHIOPRINE 50 MG: 50 TABLET ORAL at 08:47

## 2020-12-04 RX ADMIN — PIPERACILLIN AND TAZOBACTAM 4.5 G: 4; .5 INJECTION, POWDER, FOR SOLUTION INTRAVENOUS at 15:40

## 2020-12-04 RX ADMIN — QUETIAPINE 25 MG: 25 TABLET ORAL at 22:17

## 2020-12-04 RX ADMIN — PYRIDOSTIGMINE BROMIDE 30 MG: 60 TABLET ORAL at 17:28

## 2020-12-04 RX ADMIN — MELATONIN 5 MG TABLET 5 MG: at 20:13

## 2020-12-04 RX ADMIN — NYSTATIN AND TRIAMCINOLONE ACETONIDE: 100000; 1 OINTMENT TOPICAL at 20:25

## 2020-12-04 RX ADMIN — MICONAZOLE NITRATE: 20 POWDER TOPICAL at 09:40

## 2020-12-04 RX ADMIN — NYSTATIN 500000 UNITS: 100000 SUSPENSION ORAL at 09:46

## 2020-12-04 RX ADMIN — SULFAMETHOXAZOLE AND TRIMETHOPRIM 400 MG: 200; 40 SUSPENSION ORAL at 23:02

## 2020-12-04 RX ADMIN — PYRIDOSTIGMINE BROMIDE 30 MG: 60 TABLET ORAL at 08:45

## 2020-12-04 RX ADMIN — NYSTATIN 500000 UNITS: 100000 SUSPENSION ORAL at 08:45

## 2020-12-04 RX ADMIN — SULFAMETHOXAZOLE AND TRIMETHOPRIM 400 MG: 200; 40 SUSPENSION ORAL at 08:48

## 2020-12-04 RX ADMIN — NYSTATIN AND TRIAMCINOLONE ACETONIDE: 100000; 1 OINTMENT TOPICAL at 09:40

## 2020-12-04 RX ADMIN — HEPARIN SODIUM 5000 UNITS: 5000 INJECTION, SOLUTION INTRAVENOUS; SUBCUTANEOUS at 12:16

## 2020-12-04 RX ADMIN — NYSTATIN 500000 UNITS: 100000 SUSPENSION ORAL at 23:02

## 2020-12-04 RX ADMIN — NYSTATIN 500000 UNITS: 100000 SUSPENSION ORAL at 12:16

## 2020-12-04 RX ADMIN — PANTOPRAZOLE SODIUM 40 MG: 40 INJECTION, POWDER, FOR SOLUTION INTRAVENOUS at 08:45

## 2020-12-04 RX ADMIN — PIPERACILLIN AND TAZOBACTAM 4.5 G: 4; .5 INJECTION, POWDER, FOR SOLUTION INTRAVENOUS at 04:14

## 2020-12-04 ASSESSMENT — ACTIVITIES OF DAILY LIVING (ADL)
ADLS_ACUITY_SCORE: 22

## 2020-12-04 NOTE — PROGRESS NOTES
Patient achieved -40 NIF [id number] on FVC. Both done with good patient effort.   RT will continue to monitor and follow.           Day Moore, RT  12/4/2020

## 2020-12-04 NOTE — PLAN OF CARE
A&O x4. Pt non verbal due to trach, can communicate by writing on notepad. VSS on HF on trach dome. Up with A1, not OOB overnight. NPO, tolerating TFG at goal rate of 65. Tele SR. IV SL, with intermittent Zosyn. Dressing to trach CDI. Excoriation around coccyx, ointment applied in evening. Weston catheter patent. Pt moving independently in bed. Patient denies pain. Pt awaiting bed placement at [facility]. Continue to monitor.

## 2020-12-04 NOTE — PLAN OF CARE
Pt to floor from ICU, oriented to room and call lights, pt alert and oriented, witting out requests on paper, denies pain, lungs sounds coarse throughout, suctioning done upon arrival by RT, continues on high flow trach dome, trach intact, secretions noted, pt up with assist of one from wheelchair to bed, short of breath with activity, tube feeding remains at goal rate, albarado in place with good urine output. Incontinent of stool, buttocks very reddened, barrier cream applied, pt rolls from side to side independently,

## 2020-12-04 NOTE — PLAN OF CARE
VSS, however the pt's HR is tachycardic in the 110's.  Pt remains in I[facility] status per MD orders. Pt denies pain.  Pt remains on high flow PO2 via tracheostomy at 50% on 35L FiO2.  Moderate amount of clear sputum out the pt's tracheostomy, pt is able to cough it up himself.  Pt remains NPO, NJ tube in place and tube feeding infusing per MD orders. Pt having loose incontinent stools, tori area remains red, cares done per MD orders.  Pt was up to the chair with an assist x 2.

## 2020-12-04 NOTE — PROGRESS NOTES
Care Management Follow Up    Length of Stay (days): 24    Expected Discharge Date: 12/04/20([facility])  Expected Time of Departure: Date/time unknown  Concerns to be Addressed: discharge planning;adjustment to diagnosis/illness     Patient plan of care discussed at interdisciplinary rounds: Yes    Anticipated Discharge Disposition: LTACH  Disposition Comments: Discharge plan remains unknown at this time; Pt resides in a [facility] in [place], [place] that family is hopeful he can return to at some point.  Anticipated Discharge Services: Transportation Services  Anticipated Discharge DME: Oxygen    Patient/family educated on Medicare website which has current facility and service quality ratings: yes  Education Provided on the Discharge Plan:    Patient/Family in Agreement with the Plan: other (see comments)    Referrals Placed by CM/SW: Transportation(anticipate [facility] Transportation when bed available)  Private pay costs discussed: Not applicable    Additional Information:  I spoke with Rodrigo the [facility] Liaison, [phone], and he stated this pt is first on their acceptance list and it is a matter of a pt discharging to create a bed.  He does not have any guaranteed discharges for today or this weekend but will call the CC if this changes.  Upon the pt's transferring to [facility] they would need the full pkg and a disc if there is one.  No information would need to be faxed.      Care Coordination will continue to follow this case.    9008 Addendum:  I received a call from Rodrigo at [facility] and they do not have a bed for today but there is a strong possibility they will tomorrow.  I set up a stretcher ride via  Transportation at 1400 on 12/5/20.  I faxed the pt's face sheet and PCS from the  billing, [phone] and placed them on the front of the pt's chart.    Bedside nurse and the family updated on this POC.      Marly Post RN, BSN Care Coordinator  [facility]  Mobile:  [phone]

## 2020-12-04 NOTE — PROGRESS NOTES
[facility]    Stroke/Neurocritical Care Progress Note    Interval Events  Neuroexam remains stable. H/H improving. Patient requesting jello to eat.     Impression  1. 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 Anti CRMP-5 negative with remainder paraneoplastic antibodies sent on 11/11 returned negative.  Encouraging response to PLEX x5 (11/12-19), methylpred 500 mg IV q12 x10 (11/12-16), and IVIg x4 (11/19-22) with regard to best bulbar and limb exams over the past few days, but delayed respiratory status response necessitated trach. Hopefully will recover quickly enough that he does not need a PEG and can wean off of NG in the next few weeks.    - C/w Prednisone: 60 mg every day -  down-titration at one month from initiation   - C/w Azathioprine 50 mg daily, increase by 50 mg every 2 weeks until goal (175 mg daily)  - C/w pyridostigmine: However in view of airway secretions hindering SLP evaluation: will decrease dose to q6hrs while awake: 6AM -->12PM-->6PM.  De-escalate as tolerated.   - Last NIF/VC on 12/4 was -40/1200  - C/w Daily NIFs and VC  - Follow up with Dr. Greenwood of [facility] post-hospital for ongoing management (he may have a [place] or [place] outreach clinic available for this patient from [place])    2. Respiratory paralysis- secondary to #1, following daily inspiratory forces/tidal volumes as tolerated in response to therapy    3. Lung consolidation- s/p 7 day course of unasyn.  "    4.  Aspiration Pneumonia - bronchoscopy completed 11/25 with moderate thick tan secretions, consistent with aspiration of tube feeds.  - Remains afebrile, with now resolved leukocytosis (~10 from ~17-18 on 11/24-11/25).   - Vanc/Zosyn started 11/26 for Aspiration PNA -with subsequent fungal cultures growing pansensitive Enterobacter cloacae/stenotrophomonas m. with antibiotic de-escalation to Zosyn  - Remains on Bactrim for PCP prophylaxis     5. Upper GI bleeding  - Overnight on 11/28 NG suctioned 150 mls medina red blood  - Status post EGD 11/28 that showed erosive esophagitis and nonbleeding gastropathy and duodenal ulcers  - Multiple bowel movements overnight 11/29: stool guaiac test pending.  - He remains on systemic steroids for management of myasthenic crisis.  H&H had slowly drifted down from an admission hemoglobin ~14 to 9.9 on 11/20. Re-evaluated by GI, will c/w steroids as scheduled - appreciate GI recommendations.  - C/w Protonix 40 IV bid until on steroids.     6. Insomnia  - Multifactorial: Systemic steroid use, prolonged ICU stay  - C/w 25mg at bedtime and c/w 5 mg melatonin at 7 PM nightly  - Okay to restrict nighttime fingerstick glucose checks and neuro checks to every 8 hours to allow for adequate sleep.    The Stroke/Neurocritical Care Staff is Dr. Montalvo.     Miguel Hernandez MD  Vascular Neurology Fellow  To page me or covering stroke neurology team member, click here: AMCOM   Choose \"On Call\" tab at top, then search dropdown box for \"Neurology Adult\", select location, press Enter, then look for stroke/neuro ICU/telestroke.    ______________________________________________________    Medications   Home Meds  Prior to Admission medications    Medication Sig Start Date End Date Taking? Authorizing Provider   amoxicillin-clavulanate (AUGMENTIN) 875-125 MG tablet Take 1 tablet by mouth 2 times daily X 10 days (started 11/9/2020)   Yes Unknown, Entered By History   HYDROcodone-acetaminophen " (NORCO) 5-325 MG tablet Take 1-2 tablets by mouth 2 times daily as needed for severe pain    Yes Unknown, Entered By History   predniSONE (DELTASONE) 20 MG tablet Take 20 mg by mouth 2 times daily X 5 days (started 11/9/2020)   Yes Unknown, Entered By History   tamsulosin (FLOMAX) 0.4 MG capsule Take 0.4 mg by mouth daily   Yes Unknown, Entered By History       Scheduled Meds    [Held by provider] amLODIPine  10 mg Per Feeding Tube Daily     zz extemporaneous template  50 mg Oral or Feeding Tube Daily    Followed by     [START ON 12/8/2020] zz extemporaneous template  100 mg Oral or Feeding Tube Daily    Followed by     [START ON 12/22/2020] zz extemporaneous template  150 mg Oral or Feeding Tube Daily    Followed by     [START ON 1/5/2021] zz extemporaneous template  175 mg Oral or Feeding Tube Daily     heparin ANTICOAGULANT  5,000 Units Subcutaneous Q8H     insulin aspart  1-6 Units Subcutaneous Q8H     melatonin  5 mg Oral At Bedtime     nystatin  500,000 Units Mouth/Throat 4x Daily     nystatin-triamcinolone   Topical BID     pantoprazole (PROTONIX) IV  40 mg Intravenous BID     piperacillin-tazobactam  4.5 g Intravenous Q6H     predniSONE  60 mg Oral or Feeding Tube Daily    Followed by     [START ON 12/12/2020] predniSONE  50 mg Oral or Feeding Tube Daily     pyridostigmine  30 mg Oral TID     QUEtiapine  25 mg Oral At Bedtime     sulfamethoxazole-trimethoprim  50 mL Oral or Feeding Tube Q8H       Infusion Meds    - MEDICATION INSTRUCTIONS -       - MEDICATION INSTRUCTIONS -         PRN Meds  acetaminophen **OR** acetaminophen, artificial tears ophthalmic solution, glucose **OR** dextrose **OR** glucagon, hydrogen peroxide, labetalol, miconazole, miconazole, midazolam, naloxone **OR** naloxone **OR** naloxone **OR** naloxone, nitroGLYcerin, - MEDICATION INSTRUCTIONS -, ondansetron **OR** ondansetron, - MEDICATION INSTRUCTIONS -, prochlorperazine, senna-docusate **OR** senna-docusate       PHYSICAL  EXAMINATION  Temp:  [98.4  F (36.9  C)-98.8  F (37.1  C)] 98.8  F (37.1  C)  Pulse:  [] 106  Resp:  [13-31] 24  BP: (102-124)/(68-85) 109/82  FiO2 (%):  [50 %-60 %] 50 %  SpO2:  [68 %-99 %] 95 %     Date 11/13 11/14 11/15 11/16 11/17 11/23 11/27 12/1 12/3 12/4   MIP/NIF -7 -4 -3 -5 -1 -20 -16 -30 -40 -40   Vital Capacity 290 180 240 320 500 930 -- 1100 1200 1200       General:  patient without any acute distress    HEENT:  normocephalic/atraumatic, edentulous, trach present  Cardio:  RRR  Pulmonary: Tach dome, non-purulent secretions  Abdomen:  soft, NT/ND  Extremities:  no edema, peripheral pulses palpable  Skin:  intact, warm/dry      Neurologic  Mental Status: Awake, alert, oriented x4, writes with left hand and uses letter board.  Cranial Nerves: PERRLA, EOMI, No ptosis, face symmetric, shoulder shrug and head turn normal, neck flexion/extension 5/5  Motor:   Shoulder flexion -extension: 5/5  Elbow flexion/extension: 5/4+  Wrist flexion/extension: 4+/4+  Finger flexion/extension: 4+/4+    Hip flexion -extension: 5/4+  Knee flexion/extension: 5/4+  Ankle flexion/extension: 5/4+  Toe flexion/extension: 5/4+    No fasciculations, twitches, or abnormal movements.    Reflexes:  2+ intact and symmetric reflexes in bilateral biceps, brachioradialis, patellae, achilles, toes downgoing  Sensory:  light touch sensation intact and symmetric throughout upper and lower extremities  Coordination:  not tested  Station/Gait:  not tested    Imaging  I personally reviewed all imaging; relevant findings per HPI.     Lab Results Data   CBC  Recent Labs   Lab 12/04/20  0526 12/03/20  0726 12/03/20  0450 12/02/20  0645   WBC 9.7 12.3*  --  10.9   RBC 4.08* 3.97*  --  3.95*   HGB 12.2* 11.8*  --  11.8*   HCT 36.5* 35.4*  --  35.2*    439 430 413     Basic Metabolic Panel    Recent Labs   Lab 12/04/20  0526 12/03/20  0726 12/02/20  0645    134 136   POTASSIUM 4.1 4.2 4.0   CHLORIDE 105 106 106   CO2 23 20 23   BUN 19  16 18   CR 0.82 0.76 0.79   * 133* 142*   JESSICA 8.5 8.1* 8.1*     Liver Panel  Recent Labs   Lab 12/04/20  0526 12/03/20  0726 12/02/20  0645   PROTTOTAL 6.9 6.7* 6.5*   ALBUMIN 2.6* 2.5* 2.4*   BILITOTAL 0.2 0.3 0.2   ALKPHOS 135 136 151*   AST 19 21 25   ALT 69 75* 85*     INR    Recent Labs   Lab Test 11/27/20 2000 11/17/20  0400   INR 1.08 1.20*      Lipid Profile  No lab results found.  A1C    Recent Labs   Lab Test 11/14/20  0402   A1C 6.0*     Troponin I    Recent Labs   Lab 11/30/20  0130   TROPI 0.021
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
## 2020-12-05 VITALS
DIASTOLIC BLOOD PRESSURE: 80 MMHG | SYSTOLIC BLOOD PRESSURE: 117 MMHG | TEMPERATURE: 97.4 F | WEIGHT: 163.36 LBS | HEART RATE: 91 BPM | HEIGHT: 69 IN | OXYGEN SATURATION: 92 % | BODY MASS INDEX: 24.2 KG/M2 | RESPIRATION RATE: 31 BRPM

## 2020-12-05 LAB
ALBUMIN SERPL-MCNC: 2.7 G/DL (ref 3.4–5)
ALP SERPL-CCNC: 121 U/L (ref 40–150)
ALT SERPL W P-5'-P-CCNC: 60 U/L (ref 0–70)
ANION GAP SERPL CALCULATED.3IONS-SCNC: 9 MMOL/L (ref 3–14)
AST SERPL W P-5'-P-CCNC: 18 U/L (ref 0–45)
BILIRUB SERPL-MCNC: 0.2 MG/DL (ref 0.2–1.3)
BUN SERPL-MCNC: 21 MG/DL (ref 7–30)
CALCIUM SERPL-MCNC: 8.7 MG/DL (ref 8.5–10.1)
CHLORIDE SERPL-SCNC: 103 MMOL/L (ref 94–109)
CO2 SERPL-SCNC: 22 MMOL/L (ref 20–32)
CREAT SERPL-MCNC: 0.79 MG/DL (ref 0.66–1.25)
GFR SERPL CREATININE-BSD FRML MDRD: >90 ML/MIN/{1.73_M2}
GLUCOSE SERPL-MCNC: 96 MG/DL (ref 70–99)
POTASSIUM SERPL-SCNC: 4.3 MMOL/L (ref 3.4–5.3)
PROT SERPL-MCNC: 6.7 G/DL (ref 6.8–8.8)
SODIUM SERPL-SCNC: 134 MMOL/L (ref 133–144)

## 2020-12-05 PROCEDURE — 99238 HOSP IP/OBS DSCHRG MGMT 30/<: CPT | Performed by: HOSPITALIST

## 2020-12-05 PROCEDURE — 36415 COLL VENOUS BLD VENIPUNCTURE: CPT | Performed by: PHYSICIAN ASSISTANT

## 2020-12-05 PROCEDURE — 99232 SBSQ HOSP IP/OBS MODERATE 35: CPT | Mod: GC | Performed by: PSYCHIATRY & NEUROLOGY

## 2020-12-05 PROCEDURE — 250N000011 HC RX IP 250 OP 636: Performed by: PHYSICIAN ASSISTANT

## 2020-12-05 PROCEDURE — 999N000215 HC STATISTIC HFNC ADULT NON-CPAP

## 2020-12-05 PROCEDURE — 250N000009 HC RX 250: Performed by: PHYSICIAN ASSISTANT

## 2020-12-05 PROCEDURE — 250N000013 HC RX MED GY IP 250 OP 250 PS 637: Performed by: PHYSICIAN ASSISTANT

## 2020-12-05 PROCEDURE — 99207 PR CDG-CODE INCORRECT PER BILLING BASED ON TIME: CPT | Performed by: HOSPITALIST

## 2020-12-05 PROCEDURE — 250N000013 HC RX MED GY IP 250 OP 250 PS 637: Performed by: STUDENT IN AN ORGANIZED HEALTH CARE EDUCATION/TRAINING PROGRAM

## 2020-12-05 PROCEDURE — C9113 INJ PANTOPRAZOLE SODIUM, VIA: HCPCS | Performed by: PHYSICIAN ASSISTANT

## 2020-12-05 PROCEDURE — 999N000157 HC STATISTIC RCP TIME EA 10 MIN

## 2020-12-05 PROCEDURE — 250N000012 HC RX MED GY IP 250 OP 636 PS 637: Performed by: PHYSICIAN ASSISTANT

## 2020-12-05 PROCEDURE — 80053 COMPREHEN METABOLIC PANEL: CPT | Performed by: PHYSICIAN ASSISTANT

## 2020-12-05 RX ORDER — SULFAMETHOXAZOLE AND TRIMETHOPRIM 200; 40 MG/5ML; MG/5ML
50 SUSPENSION ORAL EVERY 8 HOURS
Qty: 500 ML | Refills: 0 | DISCHARGE
Start: 2020-12-05 | End: 2020-12-09

## 2020-12-05 RX ORDER — NYSTATIN 100000/ML
500000 SUSPENSION, ORAL (FINAL DOSE FORM) ORAL 4 TIMES DAILY
DISCHARGE
Start: 2020-12-05

## 2020-12-05 RX ORDER — ONDANSETRON 8 MG/1
8 TABLET, ORALLY DISINTEGRATING ORAL EVERY 6 HOURS PRN
DISCHARGE
Start: 2020-12-05

## 2020-12-05 RX ORDER — HEPARIN SODIUM 5000 [USP'U]/.5ML
5000 INJECTION, SOLUTION INTRAVENOUS; SUBCUTANEOUS EVERY 8 HOURS
DISCHARGE
Start: 2020-12-05

## 2020-12-05 RX ORDER — NITROGLYCERIN 0.4 MG/1
TABLET SUBLINGUAL
DISCHARGE
Start: 2020-12-05

## 2020-12-05 RX ORDER — PREDNISONE 50 MG/1
50 TABLET ORAL DAILY
DISCHARGE
Start: 2020-12-12

## 2020-12-05 RX ORDER — PYRIDOSTIGMINE BROMIDE 30 MG/1
30 TABLET ORAL 3 TIMES DAILY
DISCHARGE
Start: 2020-12-05

## 2020-12-05 RX ORDER — AZATHIOPRINE 100 MG/1
100 TABLET ORAL DAILY
DISCHARGE
Start: 2021-01-05

## 2020-12-05 RX ORDER — ACETAMINOPHEN 160 MG
TABLET,DISINTEGRATING ORAL EVERY 8 HOURS PRN
DISCHARGE
Start: 2020-12-05

## 2020-12-05 RX ORDER — QUETIAPINE FUMARATE 25 MG/1
25 TABLET, FILM COATED ORAL AT BEDTIME
DISCHARGE
Start: 2020-12-05

## 2020-12-05 RX ORDER — NYSTATIN AND TRIAMCINOLONE ACETONIDE 100000; 1 [USP'U]/G; MG/G
OINTMENT TOPICAL 2 TIMES DAILY
DISCHARGE
Start: 2020-12-05

## 2020-12-05 RX ORDER — AZATHIOPRINE 75 MG/1
75 TABLET ORAL DAILY
DISCHARGE
Start: 2021-01-05

## 2020-12-05 RX ORDER — CARBOXYMETHYLCELLULOSE SODIUM 5 MG/ML
1 SOLUTION/ DROPS OPHTHALMIC
DISCHARGE
Start: 2020-12-05

## 2020-12-05 RX ORDER — MICONAZOLE NITRATE 20 MG/G
CREAM TOPICAL
DISCHARGE
Start: 2020-12-05

## 2020-12-05 RX ORDER — AMOXICILLIN 250 MG
1 CAPSULE ORAL 2 TIMES DAILY PRN
DISCHARGE
Start: 2020-12-05

## 2020-12-05 RX ORDER — PREDNISONE 20 MG/1
60 TABLET ORAL DAILY
Qty: 15 TABLET | Refills: 0 | DISCHARGE
Start: 2020-12-06 | End: 2020-12-11

## 2020-12-05 RX ADMIN — AZATHIOPRINE 50 MG: 50 TABLET ORAL at 08:20

## 2020-12-05 RX ADMIN — PYRIDOSTIGMINE BROMIDE 30 MG: 60 TABLET ORAL at 06:06

## 2020-12-05 RX ADMIN — NYSTATIN AND TRIAMCINOLONE ACETONIDE: 100000; 1 OINTMENT TOPICAL at 09:00

## 2020-12-05 RX ADMIN — HEPARIN SODIUM 5000 UNITS: 5000 INJECTION, SOLUTION INTRAVENOUS; SUBCUTANEOUS at 03:50

## 2020-12-05 RX ADMIN — PANTOPRAZOLE SODIUM 40 MG: 40 INJECTION, POWDER, FOR SOLUTION INTRAVENOUS at 08:20

## 2020-12-05 RX ADMIN — MICONAZOLE NITRATE: 20 CREAM TOPICAL at 08:47

## 2020-12-05 RX ADMIN — NYSTATIN 500000 UNITS: 100000 SUSPENSION ORAL at 06:06

## 2020-12-05 RX ADMIN — MICONAZOLE NITRATE: 20 POWDER TOPICAL at 08:47

## 2020-12-05 RX ADMIN — NYSTATIN 500000 UNITS: 100000 SUSPENSION ORAL at 12:37

## 2020-12-05 RX ADMIN — PREDNISONE 60 MG: 20 TABLET ORAL at 08:20

## 2020-12-05 RX ADMIN — SULFAMETHOXAZOLE AND TRIMETHOPRIM 400 MG: 200; 40 SUSPENSION ORAL at 08:20

## 2020-12-05 RX ADMIN — PYRIDOSTIGMINE BROMIDE 30 MG: 60 TABLET ORAL at 12:37

## 2020-12-05 RX ADMIN — HEPARIN SODIUM 5000 UNITS: 5000 INJECTION, SOLUTION INTRAVENOUS; SUBCUTANEOUS at 12:38

## 2020-12-05 ASSESSMENT — ACTIVITIES OF DAILY LIVING (ADL)
ADLS_ACUITY_SCORE: 26
ADLS_ACUITY_SCORE: 22
ADLS_ACUITY_SCORE: 26
ADLS_ACUITY_SCORE: 22

## 2020-12-05 NOTE — PROGRESS NOTES
Pt transferred to [facility] per MD orders with medical transport stretcher.  Belongings packed up and sent with the pt. Report called to RN at  [facility].  MD to call pt's family.

## 2020-12-05 NOTE — PLAN OF CARE
"Speech Language Therapy Discharge Summary    Reason for therapy discharge:    Discharged to LTACH    Progress towards therapy goal(s). See goals on Care Plan in [facility] electronic health record for goal details.  Goals not met.  Barriers to achieving goals:   discharge from facility.    Therapy recommendation(s):    Continued therapy is recommended.  Rationale/Recommendations:  Pt is below baseline and requires ongoing skilled intervention for speech/communication/PMSV and dysphagia .    Last SLP session, pt tolerated PMSV trials for 23 minutes. At time of discharge SLP rec'd PMSV use with SLP or RT ONLY at this time d/t pt's limited tolerance secondary to secretion management. \"    "

## 2020-12-05 NOTE — PROGRESS NOTES
I assisted with this patients discharge to [facility] Rm 235 today. It was confirmed this patient doesn't need a PEG tube.  [facility] has a bed, a 1400 ride has been set up, Hospitalist has been informed of Doc to Doc report to Dr. Wiley [phone], RN informed to call report to Dharmesh Urbano [phone], Hospitalist is putting in orders/MAR and finishing discharge summary which will be faxed to [phone] by [facility]. I have updated floor charge/staff, CTS team and I called this patients daughter Piper and informed her of the transfer today.  She had no questions and was grateful for the call.  She will contact [facility] later today to check on patient.

## 2020-12-05 NOTE — PROGRESS NOTES
[facility] [place]    Stroke/Neurocritical Care Progress Note    Interval Events  Neuroexam remains stable. H/H improving.     Impression  1. 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 Anti CRMP-5 negative with remainder paraneoplastic antibodies sent on 11/11 returned negative.  Encouraging response to PLEX x5 (11/12-19), methylpred 500 mg IV q12 x10 (11/12-16), and IVIg x4 (11/19-22) with regard to best bulbar and limb exams over the past few days, but delayed respiratory status response necessitated trach. Hopefully will recover quickly enough that he does not need a PEG and can wean off of NG in the next few weeks.    - C/w Prednisone: 60 mg every day -  down-titration at one month from initiation   - C/w Azathioprine 50 mg daily, increase by 50 mg every 2 weeks until goal (175 mg daily)  - C/w pyridostigmine: However in view of airway secretions hindering SLP evaluation: will decrease dose to q6hrs while awake: 6AM -->12PM-->6PM.  De-escalate as tolerated.   - Last NIF/VC on 12/4 was -40/1200  - C/w Daily NIFs and VC  - Follow up with Dr. Greenwood of [facility] post-hospital for ongoing management (he may have a [place] or [place] outreach clinic available for this patient from [place])    2. Respiratory paralysis- secondary to #1, following daily inspiratory forces/tidal volumes as tolerated in response to therapy    3. Lung consolidation- s/p 7 day course of unasyn.     4.  Aspiration Pneumonia -  "bronchoscopy completed 11/25 with moderate thick tan secretions, consistent with aspiration of tube feeds.  - Remains afebrile, with now resolved leukocytosis (~10 from ~17-18 on 11/24-11/25).   - Vanc/Zosyn started 11/26 for Aspiration PNA -with subsequent fungal cultures growing pansensitive Enterobacter cloacae/stenotrophomonas m. with antibiotic de-escalation to Zosyn  - Remains on Bactrim for PCP prophylaxis     5. Upper GI bleeding  - Overnight on 11/28 NG suctioned 150 mls medina red blood  - Status post EGD 11/28 that showed erosive esophagitis and nonbleeding gastropathy and duodenal ulcers  - Multiple bowel movements overnight 11/29: stool guaiac test pending.  - He remains on systemic steroids for management of myasthenic crisis.  H&H had slowly drifted down from an admission hemoglobin ~14 to 9.9 on 11/20. Re-evaluated by GI, will c/w steroids as scheduled - appreciate GI recommendations.  - C/w Protonix 40 IV bid until on steroids.     6. Insomnia  - Multifactorial: Systemic steroid use, prolonged ICU stay  - C/w 25mg at bedtime and c/w 5 mg melatonin at 7 PM nightly  - Okay to restrict nighttime fingerstick glucose checks and neuro checks to every 8 hours to allow for adequate sleep.    The Stroke/Neurocritical Care Staff is Dr. Montalvo.     Miguel Hernandez MD  Vascular Neurology Fellow  To page me or covering stroke neurology team member, click here: AMCOM   Choose \"On Call\" tab at top, then search dropdown box for \"Neurology Adult\", select location, press Enter, then look for stroke/neuro ICU/telestroke.    ______________________________________________________    Medications   Home Meds  Prior to Admission medications    Medication Sig Start Date End Date Taking? Authorizing Provider   amoxicillin-clavulanate (AUGMENTIN) 875-125 MG tablet Take 1 tablet by mouth 2 times daily X 10 days (started 11/9/2020)   Yes Unknown, Entered By History   HYDROcodone-acetaminophen (NORCO) 5-325 MG tablet Take 1-2 " tablets by mouth 2 times daily as needed for severe pain    Yes Unknown, Entered By History   predniSONE (DELTASONE) 20 MG tablet Take 20 mg by mouth 2 times daily X 5 days (started 11/9/2020)   Yes Unknown, Entered By History   tamsulosin (FLOMAX) 0.4 MG capsule Take 0.4 mg by mouth daily   Yes Unknown, Entered By History       Scheduled Meds    [Held by provider] amLODIPine  10 mg Per Feeding Tube Daily     zz extemporaneous template  50 mg Oral or Feeding Tube Daily    Followed by     [START ON 12/8/2020] zz extemporaneous template  100 mg Oral or Feeding Tube Daily    Followed by     [START ON 12/22/2020] zz extemporaneous template  150 mg Oral or Feeding Tube Daily    Followed by     [START ON 1/5/2021] zz extemporaneous template  175 mg Oral or Feeding Tube Daily     heparin ANTICOAGULANT  5,000 Units Subcutaneous Q8H     insulin aspart  1-6 Units Subcutaneous Q8H     melatonin  5 mg Oral At Bedtime     nystatin  500,000 Units Mouth/Throat 4x Daily     nystatin-triamcinolone   Topical BID     pantoprazole (PROTONIX) IV  40 mg Intravenous BID     predniSONE  60 mg Oral or Feeding Tube Daily    Followed by     [START ON 12/12/2020] predniSONE  50 mg Oral or Feeding Tube Daily     pyridostigmine  30 mg Oral TID     QUEtiapine  25 mg Oral At Bedtime     sodium chloride (PF)  3 mL Intracatheter Q8H     sulfamethoxazole-trimethoprim  50 mL Oral or Feeding Tube Q8H       Infusion Meds    - MEDICATION INSTRUCTIONS -       - MEDICATION INSTRUCTIONS -         PRN Meds  acetaminophen **OR** acetaminophen, artificial tears ophthalmic solution, glucose **OR** dextrose **OR** glucagon, hydrogen peroxide, labetalol, miconazole, miconazole, midazolam, naloxone **OR** naloxone **OR** naloxone **OR** naloxone, nitroGLYcerin, - MEDICATION INSTRUCTIONS -, ondansetron **OR** ondansetron, - MEDICATION INSTRUCTIONS -, prochlorperazine, senna-docusate **OR** senna-docusate, sodium chloride (PF)       PHYSICAL EXAMINATION  Temp:   [97.4  F (36.3  C)-98.8  F (37.1  C)] 97.4  F (36.3  C)  Pulse:  [] 65  Resp:  [14-32] 32  BP: ()/(63-86) 109/76  FiO2 (%):  [50 %] 50 %  SpO2:  [86 %-99 %] 91 %     Date 11/13 11/14 11/15 11/16 11/17 11/23 11/27 12/1 12/3 12/4   [facility]/NIF -7 -4 -3 -5 -1 -20 -16 -30 -40 -40   Vital Capacity 290 180 240 320 500 930 -- 1100 1200 1200       General:  patient without any acute distress    HEENT:  normocephalic/atraumatic, edentulous, trach present  Cardio:  RRR  Pulmonary: Tach dome, non-purulent secretions  Abdomen:  soft, NT/ND  Extremities:  no edema, peripheral pulses palpable  Skin:  intact, warm/dry      Neurologic  Mental Status: Awake, alert, oriented x4, writes with left hand and uses letter board.  Cranial Nerves: PERRLA, EOMI, No ptosis, face symmetric, shoulder shrug and head turn normal, neck flexion/extension 5/5  Motor:   Shoulder flexion -extension: 5/5  Elbow flexion/extension: 5/4+  Wrist flexion/extension: 4+/4+  Finger flexion/extension: 4+/4+    Hip flexion -extension: 5/4+  Knee flexion/extension: 5/4+  Ankle flexion/extension: 5/4+  Toe flexion/extension: 5/4+    No fasciculations, twitches, or abnormal movements.    Reflexes:  2+ intact and symmetric reflexes in bilateral biceps, brachioradialis, patellae, achilles, toes downgoing  Sensory:  light touch sensation intact and symmetric throughout upper and lower extremities  Coordination:  not tested  Station/Gait:  not tested    Imaging  I personally reviewed all imaging; relevant findings per HPI.     Lab Results Data   CBC  Recent Labs   Lab 12/04/20  0526 12/03/20  0726 12/03/20  0450 12/02/20  0645   WBC 9.7 12.3*  --  10.9   RBC 4.08* 3.97*  --  3.95*   HGB 12.2* 11.8*  --  11.8*   HCT 36.5* 35.4*  --  35.2*    439 430 413     Basic Metabolic Panel    Recent Labs   Lab 12/05/20  0704 12/04/20  0526 12/03/20  0726    134 134   POTASSIUM 4.3 4.1 4.2   CHLORIDE 103 105 106   CO2 22 23 20   BUN 21 19 16   CR 0.79 0.82 0.76    GLC 96 116* 133*   JESSICA 8.7 8.5 8.1*     Liver Panel  Recent Labs   Lab 12/05/20  0704 12/04/20  0526 12/03/20  0726   PROTTOTAL 6.7* 6.9 6.7*   ALBUMIN 2.7* 2.6* 2.5*   BILITOTAL 0.2 0.2 0.3   ALKPHOS 121 135 136   AST 18 19 21   ALT 60 69 75*     INR    Recent Labs   Lab Test 11/27/20 2000 11/17/20  0400   INR 1.08 1.20*      Lipid Profile  No lab results found.  A1C    Recent Labs   Lab Test 11/14/20  0402   A1C 6.0*     Troponin I    Recent Labs   Lab 11/30/20  0130   TROPI 0.021

## 2020-12-05 NOTE — PLAN OF CARE
Occupational Therapy Discharge Summary    Reason for therapy discharge:    Discharged to LTACH    Progress towards therapy goal(s). See goals on Care Plan in [facility] electronic health record for goal details.  Goals not met.  Barriers to achieving goals:   discharge from facility.    Therapy recommendation(s):    Continued therapy is recommended.  Rationale/Recommendations:  To maximize IND in ADL/IADL.

## 2020-12-09 LAB
BACTERIA SPEC CULT: NORMAL
SPECIMEN SOURCE: NORMAL

## 2020-12-23 LAB
BACTERIA SPEC CULT: NORMAL
SPECIMEN SOURCE: NORMAL

## 2020-12-24 LAB
FUNGUS SPEC CULT: ABNORMAL
FUNGUS SPEC CULT: ABNORMAL
SPECIMEN SOURCE: ABNORMAL

## 2021-01-29 LAB
MYCOBACTERIUM SPEC CULT: NORMAL
MYCOBACTERIUM SPEC CULT: NORMAL
SPECIMEN SOURCE: NORMAL

## 2022-03-01 NOTE — PLAN OF CARE
Neuro: PERRL. Able to follow commands. Able to make needs known.Versed gtt continuous, patient very anxious, versed rate increase PRN 1 mg given, pt was able to sleep and rest.  Resp: LS clear/dim. Moderate amount of secretions  From trache.  Cardio: WDL, NSR  GI/: NPO, TF continuous.  Adequate urin output red tinged due to patient pulling catheter last night.   Pain: Dilaudid gtt continuous  Skin: [place] area excoriated and macerated.   Events: No concerns over night sitter at bed side, patient cooperative and redirectable.       Prescription approved per [facility] Refill Protocol.  NANY CaalN, RN  [facility]

## 2022-04-18 NOTE — PLAN OF CARE
SLP brief note: Patient seen in conjunction with RT for communication treatment with use of Passy Denis Speaking Valve (PMSV). Patient tolerated PMSV placement for 20-25 minute duration. Patient achieved adequate voicing at phrase/short sentence level (note harsh/rough vocal quality). Speech intelligibility judged about 70-80% at phrase level with careful listening and repetition as needed to slow rate and exaggerate articulation.     Recommend continue NPO status with alternate nutrition/hydration/medication and frequent oral cares. Will assess swallowing once patient consistently tolerating PMSV and managing secretions. Do not anticipate patient will be demonstrating readiness in the next week or longer.    Recommend PMSV with SLP or RT ONLY at this time d/t pt's limited tolerance secondary to secretion management. Trach cuff must be fully deflated for PMSV placement.   No

## 2023-03-12 NOTE — PROGRESS NOTES
[facility]    Stroke/Neurocritical Care Progress Note    Interval Events  Neuroexam remains stable.  Overnight was noted to have new asymptomatic bradycardia, suspected to be related pyridostigmine treatment. EKG, Trop's and BMP were unremarkable. H/H stable at 9.9/29.4 from 10/30 yesterday. Stool guaiac was not tested yesterday.     Impression  1. 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 Anti CRMP-5 negative with remainder paraneoplastic antibodies sent on 11/11 returned negative.  Encouraging response to PLEX x5 (11/12-19), methylpred 500 mg IV q12 x10 (11/12-16), and IVIg x4 (11/19-22) with regard to best bulbar and limb exams over the past few days, but delayed respiratory status response necessitated trach. Hopefully will recover quickly enough that he does not need a PEG and can wean off of NG in the next few weeks.    - C/w Prednisone: 60 mg every day -  down-titration at one month from initiation [ however, may need to be re-addressed in view of GI blood loss - will re-discuss with GI]   - C/w Azathioprine 50 mg daily, increase by 50 mg every 2 weeks until goal (175 mg daily)  - C/w pyridostigmine 30mg reduced to 4 times during the day in view of bradycardia and increased stool output.  - Last NIF on 11/29 was -30  - C/w Daily NIFs and VC (Re-discussed with RT again on 11/29 to document daily NIFs)  - Follow up with Dr. Greenwood of [facility] post-hospital for ongoing management (he may have a [place] outreach clinic available for this patient from [place])    2. Respiratory paralysis- secondary to #1, following daily inspiratory forces/tidal volumes as tolerated in response to therapy    3. Lung consolidation- s/p 7 day course of unasyn.     4.  Aspiration Pneumonia - bronchoscopy completed 11/25 with moderate thick tan secretions, consistent with aspiration of tube feeds.  - Remains afebrile, with now resolved leukocytosis (~10 from ~17-18 on 11/24-11/25).   - Vanc/Zosyn started 11/26 for Aspiration PNA -with subsequent fungal cultures growing pansensitive Enterobacter cloacae/stenotrophomonas m. with antibiotic de-escalation to Zosyn  - Remains on Bactrim for PCP prophylaxis     5. Upper GI bleeding  - Overnight on 11/28 NG suctioned 150 mls medina red blood  - Status post EGD 11/28 that showed erosive esophagitis and nonbleeding gastropathy and duodenal ulcers  - Multiple bowel movements overnight 11/29: stool guaiac test pending.  - He remains on systemic steroids for management of myasthenic crisis.  H&H has remained stable at around 10/30, however lower when compared to admission H&H of 13/42.  - C/w Protonix 40 IV bid  - Given the need for systemic steroids for treatment of myasthenic crisis, the patient will need close monitoring for GI bleeding given continued risk of steroid-induced gastropathy.    6. Insomnia  - Multifactorial: Systemic steroid use, prolonged ICU stay  - Changed high-dose seroquel to 25mg at bedtime and c/w 5 mg melatonin at 7 PM nightly [ordered].   - Okay to restrict nighttime fingerstick glucose checks and neuro checks to every 8 hours to allow for adequate sleep.    The Stroke/Neurocritical Care Staff is Dr. Montalvo.     Miguel Hernandez MD  Vascular Neurology Fellow  To page me or covering stroke neurology team member, click here: AMCOM   Choose \"On Call\" tab at top, then search dropdown box for \"Neurology Adult\", select location, press Enter, then look for " stroke/neuro ICU/telestroke.    ______________________________________________________    Medications   Home Meds  Prior to Admission medications    Medication Sig Start Date End Date Taking? Authorizing Provider   amoxicillin-clavulanate (AUGMENTIN) 875-125 MG tablet Take 1 tablet by mouth 2 times daily X 10 days (started 11/9/2020)   Yes Unknown, Entered By History   HYDROcodone-acetaminophen (NORCO) 5-325 MG tablet Take 1-2 tablets by mouth 2 times daily as needed for severe pain    Yes Unknown, Entered By History   predniSONE (DELTASONE) 20 MG tablet Take 20 mg by mouth 2 times daily X 5 days (started 11/9/2020)   Yes Unknown, Entered By History   tamsulosin (FLOMAX) 0.4 MG capsule Take 0.4 mg by mouth daily   Yes Unknown, Entered By History       Scheduled Meds    [Held by provider] amLODIPine  10 mg Per Feeding Tube Daily     zz extemporaneous template  50 mg Oral or Feeding Tube Daily    Followed by     [START ON 12/8/2020] zz extemporaneous template  100 mg Oral or Feeding Tube Daily    Followed by     [START ON 12/22/2020] zz extemporaneous template  150 mg Oral or Feeding Tube Daily    Followed by     [START ON 1/5/2021] zz extemporaneous template  175 mg Oral or Feeding Tube Daily     chlorhexidine  15 mL Mouth/Throat Q12H     heparin ANTICOAGULANT  5,000 Units Subcutaneous Q8H     insulin aspart  1-6 Units Subcutaneous Q4H     melatonin  5 mg Oral At Bedtime     nystatin  500,000 Units Mouth/Throat 4x Daily     pantoprazole (PROTONIX) IV  40 mg Intravenous BID     piperacillin-tazobactam  4.5 g Intravenous Q6H     predniSONE  60 mg Oral or Feeding Tube Daily    Followed by     [START ON 12/12/2020] predniSONE  50 mg Oral or Feeding Tube Daily     pyridostigmine  30 mg Oral 4x Daily     QUEtiapine  25 mg Oral At Bedtime     sulfamethoxazole-trimethoprim  50 mL Oral or Feeding Tube Q8H       Infusion Meds    sodium chloride 20 mL/hr at 11/27/20 2000       PRN Meds  acetaminophen **OR** acetaminophen,  albuterol, artificial tears ophthalmic solution, glucose **OR** dextrose **OR** glucagon, flumazenil, hydrogen peroxide, labetalol, miconazole, miconazole, midazolam, naloxone, naloxone, naloxone **OR** naloxone **OR** naloxone **OR** naloxone, naloxone, naloxone, ondansetron **OR** ondansetron, prochlorperazine, senna-docusate **OR** senna-docusate       PHYSICAL EXAMINATION  Temp:  [98.2  F (36.8  C)-100.6  F (38.1  C)] 98.6  F (37  C)  Pulse:  [48-68] 64  Resp:  [13-31] 25  BP: ()/(70-94) 116/74  FiO2 (%):  [30 %-60 %] 50 %  SpO2:  [88 %-100 %] 98 %     Date 11/13 11/14 11/15 11/16 11/17 11/18 11/19 11/20 11/21 11/22 11/23 11/24 11/25 11/26 11/27 11/28 11/29   MIP/NIF -7 -4 -3 -5 -1 -- -- -- -- -- -20 -- -- -- -16 --    Vital Capacity 290 180 240 320 500 -- -- -- -- -- 930 -- -- -- -- --        General:  patient without any acute distress    HEENT:  normocephalic/atraumatic, edentulous, trach present  Cardio:  RRR  Pulmonary:  on mechanical ventilation  Abdomen:  soft, non-tender, non-distended  Extremities:  no edema, peripheral pulses palpable  Skin:  intact, warm/dry      Neurologic  Mental Status:  examined on no sedation. Eyes open spontaneously. Follows commands reliably, mouths/gestures/nods/shakes to answer questions, writes with left hand and uses letter board.  Cranial Nerves:  VFF, PERRL, minimal ptosis, conjugate rest gaze, intact horizontal eye movements, face symmetric, weak lip purse  Motor: restless, tapping hands and feet, movements still brisk, elbow flexion 5-/5, elbow extension 4+/5, finger flexion 5/5, finger extension 5/5. Hip flexion 4/5, knee flexion 4+/5, knee extension 4+/5, ankle dorsiflexion 5/5, ankle plantarflexion 5/5. No fasciculations, twitches, or abnormal movements.  Reflexes:  2+ intact and symmetric reflexes in bilateral biceps, brachioradialis, patellae, achilles, toes downgoing  Sensory:  light touch sensation intact and symmetric throughout upper and lower  extremities  Coordination:  not tested  Station/Gait:  not tested    Imaging  I personally reviewed all imaging; relevant findings per HPI.     Lab Results Data   CBC  Recent Labs   Lab 11/30/20  0400 11/29/20  0500 11/28/20  0500   WBC 10.9 9.6 12.2*   RBC 3.32* 3.34* 3.36*   HGB 9.9* 10.0* 10.1*   HCT 29.4* 29.9* 30.4*    305 323     Basic Metabolic Panel    Recent Labs   Lab 11/30/20 0130 11/29/20  0500 11/28/20  0500    135 139   POTASSIUM 3.6 3.6 3.3*   CHLORIDE 109 106 108   CO2 23 24 26   BUN 14 21 32*   CR 0.83 0.74 0.78   GLC 75 117* 94   JESSICA 7.7* 7.9* 7.9*     Liver Panel  Recent Labs   Lab 11/30/20 0130 11/29/20  0500 11/28/20  0500   PROTTOTAL 6.1* 6.0* 6.1*   ALBUMIN 2.1* 2.2* 2.2*   BILITOTAL 0.2 0.2 0.5   ALKPHOS 119 123 129   AST 23 23 23   ALT 97* 106* 120*     INR    Recent Labs   Lab Test 11/27/20 2000 11/17/20  0400   INR 1.08 1.20*      Lipid Profile  No lab results found.  A1C    Recent Labs   Lab Test 11/14/20  0402   A1C 6.0*     Troponin I    Recent Labs   Lab 11/30/20 0130   TROPI 0.021           No

## (undated) DEVICE — GLOVE PROTEXIS W/NEU-THERA 6.5  2D73TE65

## (undated) DEVICE — ESU GROUND PAD UNIVERSAL W/O CORD

## (undated) DEVICE — SU SILK 3-0 SH 30" K832H

## (undated) DEVICE — GLOVE PROTEXIS W/NEU-THERA 7.5  2D73TE75

## (undated) DEVICE — PACK MINOR SBA15MIFSE

## (undated) DEVICE — SYR 10ML SLIP TIP W/O NDL

## (undated) DEVICE — GOWN IMPERVIOUS ZONED LG

## (undated) DEVICE — TUBE SMOKE EVAC 7/8"X10 (STERILE)

## (undated) DEVICE — SU PROLENE 2-0 RB-1DA 36" 8559H

## (undated) DEVICE — LINEN GOWN OVERSIZE 5408

## (undated) DEVICE — SU VICRYL 4-0 PS-2 18" UND J496H

## (undated) DEVICE — SURGICEL HEMOSTAT 2X3" 1953

## (undated) DEVICE — Device

## (undated) DEVICE — ESU PENCIL W/HOLSTER E2350H

## (undated) DEVICE — SU SILK 3-0 SH CR 8X18" C013D

## (undated) DEVICE — TUBING SUCTION MEDI-VAC SOFT 3/16"X20' N520A

## (undated) DEVICE — DRSG DRAIN 4X4" 7086

## (undated) DEVICE — JELLY LUBRICATING SURGILUBE 4OZ TUBE

## (undated) DEVICE — SOL WATER IRRIG 1000ML BOTTLE 2F7114

## (undated) DEVICE — ESU ELEC BLADE 2.75" COATED/INSULATED E1455

## (undated) DEVICE — SYR 20ML SLIP TIP W/O NDL 302831

## (undated) DEVICE — SURGICEL HEMOSTAT 3X4" NUKNIT 1943

## (undated) DEVICE — LINEN TOWEL PACK X5 5464

## (undated) DEVICE — SU VICRYL 2-0 SH 27" J317H

## (undated) DEVICE — SU ETHILON 3-0 FS-1 18" 669H

## (undated) DEVICE — SOL NACL 0.9% IRRIG 1000ML BOTTLE 2F7124

## (undated) DEVICE — PREP CHLORAPREP W/ORANGE TINT 10.5ML 930715

## (undated) DEVICE — DRAPE MINOR PROCEDURE LAP 29496

## (undated) DEVICE — PREP SKIN SCRUB TRAY 4461A

## (undated) RX ORDER — LIDOCAINE HYDROCHLORIDE 10 MG/ML
INJECTION, SOLUTION EPIDURAL; INFILTRATION; INTRACAUDAL; PERINEURAL
Status: DISPENSED
Start: 2020-11-12

## (undated) RX ORDER — FENTANYL CITRATE 50 UG/ML
INJECTION, SOLUTION INTRAMUSCULAR; INTRAVENOUS
Status: DISPENSED
Start: 2020-11-19